# Patient Record
Sex: MALE | Race: WHITE | NOT HISPANIC OR LATINO | URBAN - METROPOLITAN AREA
[De-identification: names, ages, dates, MRNs, and addresses within clinical notes are randomized per-mention and may not be internally consistent; named-entity substitution may affect disease eponyms.]

---

## 2018-01-27 ENCOUNTER — EMERGENCY (EMERGENCY)
Facility: HOSPITAL | Age: 78
LOS: 0 days | Discharge: HOME | End: 2018-01-27

## 2018-01-27 DIAGNOSIS — N20.2 CALCULUS OF KIDNEY WITH CALCULUS OF URETER: ICD-10-CM

## 2018-01-27 DIAGNOSIS — M84.48XA PATHOLOGICAL FRACTURE, OTHER SITE, INITIAL ENCOUNTER FOR FRACTURE: ICD-10-CM

## 2018-01-27 DIAGNOSIS — F41.9 ANXIETY DISORDER, UNSPECIFIED: ICD-10-CM

## 2018-01-27 DIAGNOSIS — J44.1 CHRONIC OBSTRUCTIVE PULMONARY DISEASE WITH (ACUTE) EXACERBATION: ICD-10-CM

## 2018-01-27 DIAGNOSIS — N13.30 UNSPECIFIED HYDRONEPHROSIS: ICD-10-CM

## 2018-01-27 DIAGNOSIS — N39.0 URINARY TRACT INFECTION, SITE NOT SPECIFIED: ICD-10-CM

## 2018-01-27 DIAGNOSIS — Z87.442 PERSONAL HISTORY OF URINARY CALCULI: ICD-10-CM

## 2018-01-27 DIAGNOSIS — Z79.899 OTHER LONG TERM (CURRENT) DRUG THERAPY: ICD-10-CM

## 2018-01-27 DIAGNOSIS — R10.9 UNSPECIFIED ABDOMINAL PAIN: ICD-10-CM

## 2018-01-27 DIAGNOSIS — N40.0 BENIGN PROSTATIC HYPERPLASIA WITHOUT LOWER URINARY TRACT SYMPTOMS: ICD-10-CM

## 2019-01-01 ENCOUNTER — INPATIENT (INPATIENT)
Facility: HOSPITAL | Age: 79
LOS: 10 days | Discharge: HOME HEALTH | End: 2019-11-11
Payer: MEDICAID

## 2019-01-01 ENCOUNTER — INPATIENT (INPATIENT)
Facility: HOSPITAL | Age: 79
LOS: 2 days | Discharge: HOME | End: 2019-10-12
Payer: MEDICAID

## 2019-01-01 ENCOUNTER — INPATIENT (INPATIENT)
Facility: HOSPITAL | Age: 79
LOS: 12 days | End: 2019-11-28
Attending: HOSPITALIST | Admitting: HOSPITALIST
Payer: MEDICAID

## 2019-01-01 ENCOUNTER — INPATIENT (INPATIENT)
Facility: HOSPITAL | Age: 79
LOS: 13 days | Discharge: HOME HEALTH | End: 2019-08-15
Attending: PHYSICAL MEDICINE & REHABILITATION | Admitting: PHYSICAL MEDICINE & REHABILITATION
Payer: MEDICAID

## 2019-01-01 ENCOUNTER — INPATIENT (INPATIENT)
Facility: HOSPITAL | Age: 79
LOS: 3 days | Discharge: NURSING, REHAB OR LONG-TERM CARE | End: 2019-08-01
Attending: HOSPITALIST | Admitting: HOSPITALIST
Payer: MEDICAID

## 2019-01-01 VITALS
HEART RATE: 75 BPM | RESPIRATION RATE: 22 BRPM | DIASTOLIC BLOOD PRESSURE: 69 MMHG | SYSTOLIC BLOOD PRESSURE: 120 MMHG | OXYGEN SATURATION: 95 % | TEMPERATURE: 99 F

## 2019-01-01 VITALS — DIASTOLIC BLOOD PRESSURE: 62 MMHG | HEART RATE: 98 BPM | SYSTOLIC BLOOD PRESSURE: 112 MMHG

## 2019-01-01 VITALS
DIASTOLIC BLOOD PRESSURE: 55 MMHG | HEART RATE: 125 BPM | SYSTOLIC BLOOD PRESSURE: 99 MMHG | TEMPERATURE: 97 F | OXYGEN SATURATION: 91 % | RESPIRATION RATE: 18 BRPM

## 2019-01-01 VITALS
SYSTOLIC BLOOD PRESSURE: 140 MMHG | HEART RATE: 97 BPM | TEMPERATURE: 99 F | RESPIRATION RATE: 18 BRPM | DIASTOLIC BLOOD PRESSURE: 64 MMHG

## 2019-01-01 VITALS
HEART RATE: 54 BPM | TEMPERATURE: 98 F | DIASTOLIC BLOOD PRESSURE: 51 MMHG | RESPIRATION RATE: 17 BRPM | OXYGEN SATURATION: 95 % | SYSTOLIC BLOOD PRESSURE: 105 MMHG

## 2019-01-01 VITALS
RESPIRATION RATE: 24 BRPM | DIASTOLIC BLOOD PRESSURE: 58 MMHG | TEMPERATURE: 101 F | HEART RATE: 146 BPM | OXYGEN SATURATION: 90 % | SYSTOLIC BLOOD PRESSURE: 113 MMHG

## 2019-01-01 VITALS
WEIGHT: 173.94 LBS | RESPIRATION RATE: 18 BRPM | SYSTOLIC BLOOD PRESSURE: 125 MMHG | TEMPERATURE: 96 F | DIASTOLIC BLOOD PRESSURE: 74 MMHG | HEART RATE: 94 BPM

## 2019-01-01 VITALS — OXYGEN SATURATION: 93 % | RESPIRATION RATE: 21 BRPM

## 2019-01-01 DIAGNOSIS — J69.0 PNEUMONITIS DUE TO INHALATION OF FOOD AND VOMIT: ICD-10-CM

## 2019-01-01 DIAGNOSIS — Z87.891 PERSONAL HISTORY OF NICOTINE DEPENDENCE: ICD-10-CM

## 2019-01-01 DIAGNOSIS — J44.0 CHRONIC OBSTRUCTIVE PULMONARY DISEASE WITH (ACUTE) LOWER RESPIRATORY INFECTION: ICD-10-CM

## 2019-01-01 DIAGNOSIS — E87.6 HYPOKALEMIA: ICD-10-CM

## 2019-01-01 DIAGNOSIS — I95.9 HYPOTENSION, UNSPECIFIED: ICD-10-CM

## 2019-01-01 DIAGNOSIS — R62.7 ADULT FAILURE TO THRIVE: ICD-10-CM

## 2019-01-01 DIAGNOSIS — I10 ESSENTIAL (PRIMARY) HYPERTENSION: ICD-10-CM

## 2019-01-01 DIAGNOSIS — F17.200 NICOTINE DEPENDENCE, UNSPECIFIED, UNCOMPLICATED: ICD-10-CM

## 2019-01-01 DIAGNOSIS — G93.41 METABOLIC ENCEPHALOPATHY: ICD-10-CM

## 2019-01-01 DIAGNOSIS — E83.42 HYPOMAGNESEMIA: ICD-10-CM

## 2019-01-01 DIAGNOSIS — R63.6 UNDERWEIGHT: ICD-10-CM

## 2019-01-01 DIAGNOSIS — N39.0 URINARY TRACT INFECTION, SITE NOT SPECIFIED: ICD-10-CM

## 2019-01-01 DIAGNOSIS — R53.81 OTHER MALAISE: ICD-10-CM

## 2019-01-01 DIAGNOSIS — R47.1 DYSARTHRIA AND ANARTHRIA: ICD-10-CM

## 2019-01-01 DIAGNOSIS — J15.6 PNEUMONIA DUE TO OTHER GRAM-NEGATIVE BACTERIA: ICD-10-CM

## 2019-01-01 DIAGNOSIS — R63.8 OTHER SYMPTOMS AND SIGNS CONCERNING FOOD AND FLUID INTAKE: ICD-10-CM

## 2019-01-01 DIAGNOSIS — R09.89 OTHER SPECIFIED SYMPTOMS AND SIGNS INVOLVING THE CIRCULATORY AND RESPIRATORY SYSTEMS: ICD-10-CM

## 2019-01-01 DIAGNOSIS — J43.9 EMPHYSEMA, UNSPECIFIED: ICD-10-CM

## 2019-01-01 DIAGNOSIS — J20.8 ACUTE BRONCHITIS DUE TO OTHER SPECIFIED ORGANISMS: ICD-10-CM

## 2019-01-01 DIAGNOSIS — F03.90 UNSPECIFIED DEMENTIA WITHOUT BEHAVIORAL DISTURBANCE: ICD-10-CM

## 2019-01-01 DIAGNOSIS — R32 UNSPECIFIED URINARY INCONTINENCE: ICD-10-CM

## 2019-01-01 DIAGNOSIS — R33.8 OTHER RETENTION OF URINE: ICD-10-CM

## 2019-01-01 DIAGNOSIS — E61.2 MAGNESIUM DEFICIENCY: ICD-10-CM

## 2019-01-01 DIAGNOSIS — E43 UNSPECIFIED SEVERE PROTEIN-CALORIE MALNUTRITION: ICD-10-CM

## 2019-01-01 DIAGNOSIS — R13.10 DYSPHAGIA, UNSPECIFIED: ICD-10-CM

## 2019-01-01 DIAGNOSIS — R00.0 TACHYCARDIA, UNSPECIFIED: ICD-10-CM

## 2019-01-01 DIAGNOSIS — N40.0 BENIGN PROSTATIC HYPERPLASIA WITHOUT LOWER URINARY TRACT SYMPTOMS: ICD-10-CM

## 2019-01-01 DIAGNOSIS — J44.9 CHRONIC OBSTRUCTIVE PULMONARY DISEASE, UNSPECIFIED: ICD-10-CM

## 2019-01-01 DIAGNOSIS — R05 COUGH: ICD-10-CM

## 2019-01-01 DIAGNOSIS — R26.9 UNSPECIFIED ABNORMALITIES OF GAIT AND MOBILITY: ICD-10-CM

## 2019-01-01 DIAGNOSIS — Z93.1 GASTROSTOMY STATUS: Chronic | ICD-10-CM

## 2019-01-01 DIAGNOSIS — E78.5 HYPERLIPIDEMIA, UNSPECIFIED: ICD-10-CM

## 2019-01-01 DIAGNOSIS — A41.9 SEPSIS, UNSPECIFIED ORGANISM: ICD-10-CM

## 2019-01-01 DIAGNOSIS — R50.9 FEVER, UNSPECIFIED: ICD-10-CM

## 2019-01-01 DIAGNOSIS — R26.2 DIFFICULTY IN WALKING, NOT ELSEWHERE CLASSIFIED: ICD-10-CM

## 2019-01-01 DIAGNOSIS — N40.1 BENIGN PROSTATIC HYPERPLASIA WITH LOWER URINARY TRACT SYMPTOMS: ICD-10-CM

## 2019-01-01 DIAGNOSIS — J84.9 INTERSTITIAL PULMONARY DISEASE, UNSPECIFIED: ICD-10-CM

## 2019-01-01 DIAGNOSIS — M19.90 UNSPECIFIED OSTEOARTHRITIS, UNSPECIFIED SITE: ICD-10-CM

## 2019-01-01 DIAGNOSIS — E53.8 DEFICIENCY OF OTHER SPECIFIED B GROUP VITAMINS: ICD-10-CM

## 2019-01-01 DIAGNOSIS — E87.0 HYPEROSMOLALITY AND HYPERNATREMIA: ICD-10-CM

## 2019-01-01 LAB
ALBUMIN SERPL ELPH-MCNC: 2.2 G/DL — LOW (ref 3.5–5.2)
ALBUMIN SERPL ELPH-MCNC: 2.3 G/DL — LOW (ref 3.5–5.2)
ALBUMIN SERPL ELPH-MCNC: 2.4 G/DL — LOW (ref 3.5–5.2)
ALBUMIN SERPL ELPH-MCNC: 2.4 G/DL — LOW (ref 3.5–5.2)
ALBUMIN SERPL ELPH-MCNC: 2.6 G/DL — LOW (ref 3.5–5.2)
ALBUMIN SERPL ELPH-MCNC: 2.7 G/DL — LOW (ref 3.5–5.2)
ALBUMIN SERPL ELPH-MCNC: 2.9 G/DL — LOW (ref 3.5–5.2)
ALBUMIN SERPL ELPH-MCNC: 3 G/DL — LOW (ref 3.5–5.2)
ALBUMIN SERPL ELPH-MCNC: 3.1 G/DL — LOW (ref 3.5–5.2)
ALBUMIN SERPL ELPH-MCNC: 3.2 G/DL — LOW (ref 3.5–5.2)
ALBUMIN SERPL ELPH-MCNC: 3.6 G/DL — SIGNIFICANT CHANGE UP (ref 3.5–5.2)
ALBUMIN SERPL ELPH-MCNC: 3.7 G/DL — SIGNIFICANT CHANGE UP (ref 3.5–5.2)
ALP SERPL-CCNC: 47 U/L — SIGNIFICANT CHANGE UP (ref 30–115)
ALP SERPL-CCNC: 56 U/L — SIGNIFICANT CHANGE UP (ref 30–115)
ALP SERPL-CCNC: 58 U/L — SIGNIFICANT CHANGE UP (ref 30–115)
ALP SERPL-CCNC: 59 U/L — SIGNIFICANT CHANGE UP (ref 30–115)
ALP SERPL-CCNC: 60 U/L — SIGNIFICANT CHANGE UP (ref 30–115)
ALP SERPL-CCNC: 60 U/L — SIGNIFICANT CHANGE UP (ref 30–115)
ALP SERPL-CCNC: 64 U/L — SIGNIFICANT CHANGE UP (ref 30–115)
ALP SERPL-CCNC: 65 U/L — SIGNIFICANT CHANGE UP (ref 30–115)
ALP SERPL-CCNC: 65 U/L — SIGNIFICANT CHANGE UP (ref 30–115)
ALP SERPL-CCNC: 68 U/L — SIGNIFICANT CHANGE UP (ref 30–115)
ALP SERPL-CCNC: 68 U/L — SIGNIFICANT CHANGE UP (ref 30–115)
ALP SERPL-CCNC: 69 U/L — SIGNIFICANT CHANGE UP (ref 30–115)
ALP SERPL-CCNC: 74 U/L — SIGNIFICANT CHANGE UP (ref 30–115)
ALP SERPL-CCNC: 85 U/L — SIGNIFICANT CHANGE UP (ref 30–115)
ALP SERPL-CCNC: 89 U/L — SIGNIFICANT CHANGE UP (ref 30–115)
ALT FLD-CCNC: 15 U/L — SIGNIFICANT CHANGE UP (ref 0–41)
ALT FLD-CCNC: 22 U/L — SIGNIFICANT CHANGE UP (ref 0–41)
ALT FLD-CCNC: 27 U/L — SIGNIFICANT CHANGE UP (ref 0–41)
ALT FLD-CCNC: 29 U/L — SIGNIFICANT CHANGE UP (ref 0–41)
ALT FLD-CCNC: 32 U/L — SIGNIFICANT CHANGE UP (ref 0–41)
ALT FLD-CCNC: 34 U/L — SIGNIFICANT CHANGE UP (ref 0–41)
ALT FLD-CCNC: 35 U/L — SIGNIFICANT CHANGE UP (ref 0–41)
ALT FLD-CCNC: 37 U/L — SIGNIFICANT CHANGE UP (ref 0–41)
ALT FLD-CCNC: 38 U/L — SIGNIFICANT CHANGE UP (ref 0–41)
ALT FLD-CCNC: 39 U/L — SIGNIFICANT CHANGE UP (ref 0–41)
ALT FLD-CCNC: 40 U/L — SIGNIFICANT CHANGE UP (ref 0–41)
ALT FLD-CCNC: 41 U/L — SIGNIFICANT CHANGE UP (ref 0–41)
ALT FLD-CCNC: 6 U/L — SIGNIFICANT CHANGE UP (ref 0–41)
ALT FLD-CCNC: 8 U/L — SIGNIFICANT CHANGE UP (ref 0–41)
ALT FLD-CCNC: 8 U/L — SIGNIFICANT CHANGE UP (ref 0–41)
ANION GAP SERPL CALC-SCNC: 10 MMOL/L — SIGNIFICANT CHANGE UP (ref 7–14)
ANION GAP SERPL CALC-SCNC: 11 MMOL/L — SIGNIFICANT CHANGE UP (ref 7–14)
ANION GAP SERPL CALC-SCNC: 12 MMOL/L — SIGNIFICANT CHANGE UP (ref 7–14)
ANION GAP SERPL CALC-SCNC: 13 MMOL/L — SIGNIFICANT CHANGE UP (ref 7–14)
ANION GAP SERPL CALC-SCNC: 14 MMOL/L — SIGNIFICANT CHANGE UP (ref 7–14)
ANION GAP SERPL CALC-SCNC: 16 MMOL/L — HIGH (ref 7–14)
ANION GAP SERPL CALC-SCNC: 18 MMOL/L — HIGH (ref 7–14)
ANION GAP SERPL CALC-SCNC: 19 MMOL/L — HIGH (ref 7–14)
ANION GAP SERPL CALC-SCNC: 19 MMOL/L — HIGH (ref 7–14)
ANION GAP SERPL CALC-SCNC: 8 MMOL/L — SIGNIFICANT CHANGE UP (ref 7–14)
ANION GAP SERPL CALC-SCNC: 9 MMOL/L — SIGNIFICANT CHANGE UP (ref 7–14)
ANISOCYTOSIS BLD QL: SLIGHT — SIGNIFICANT CHANGE UP
APPEARANCE UR: CLEAR — SIGNIFICANT CHANGE UP
APTT BLD: 25.5 SEC — LOW (ref 27–39.2)
APTT BLD: 30 SEC — SIGNIFICANT CHANGE UP (ref 27–39.2)
APTT BLD: 31.8 SEC — SIGNIFICANT CHANGE UP (ref 27–39.2)
APTT BLD: 33.6 SEC — SIGNIFICANT CHANGE UP (ref 27–39.2)
AST SERPL-CCNC: 19 U/L — SIGNIFICANT CHANGE UP (ref 0–41)
AST SERPL-CCNC: 21 U/L — SIGNIFICANT CHANGE UP (ref 0–41)
AST SERPL-CCNC: 29 U/L — SIGNIFICANT CHANGE UP (ref 0–41)
AST SERPL-CCNC: 31 U/L — SIGNIFICANT CHANGE UP (ref 0–41)
AST SERPL-CCNC: 37 U/L — SIGNIFICANT CHANGE UP (ref 0–41)
AST SERPL-CCNC: 37 U/L — SIGNIFICANT CHANGE UP (ref 0–41)
AST SERPL-CCNC: 41 U/L — SIGNIFICANT CHANGE UP (ref 0–41)
AST SERPL-CCNC: 46 U/L — HIGH (ref 0–41)
AST SERPL-CCNC: 47 U/L — HIGH (ref 0–41)
AST SERPL-CCNC: 55 U/L — HIGH (ref 0–41)
AST SERPL-CCNC: 56 U/L — HIGH (ref 0–41)
AST SERPL-CCNC: 57 U/L — HIGH (ref 0–41)
AST SERPL-CCNC: 65 U/L — HIGH (ref 0–41)
AST SERPL-CCNC: 70 U/L — HIGH (ref 0–41)
AST SERPL-CCNC: 71 U/L — HIGH (ref 0–41)
BACTERIA # UR AUTO: NEGATIVE — SIGNIFICANT CHANGE UP
BASE EXCESS BLDA CALC-SCNC: 3.8 MMOL/L — HIGH (ref -2–2)
BASE EXCESS BLDA CALC-SCNC: 4.9 MMOL/L — HIGH (ref -2–2)
BASE EXCESS BLDV CALC-SCNC: 2.8 MMOL/L — HIGH (ref -2–2)
BASE EXCESS BLDV CALC-SCNC: 3.7 MMOL/L — HIGH (ref -2–2)
BASE EXCESS BLDV CALC-SCNC: 4.8 MMOL/L — HIGH (ref -2–2)
BASE EXCESS BLDV CALC-SCNC: 5 MMOL/L — HIGH (ref -2–2)
BASE EXCESS BLDV CALC-SCNC: 5 MMOL/L — HIGH (ref -2–2)
BASOPHILS # BLD AUTO: 0 K/UL — SIGNIFICANT CHANGE UP (ref 0–0.2)
BASOPHILS # BLD AUTO: 0 K/UL — SIGNIFICANT CHANGE UP (ref 0–0.2)
BASOPHILS # BLD AUTO: 0.01 K/UL — SIGNIFICANT CHANGE UP (ref 0–0.2)
BASOPHILS # BLD AUTO: 0.02 K/UL — SIGNIFICANT CHANGE UP (ref 0–0.2)
BASOPHILS # BLD AUTO: 0.03 K/UL — SIGNIFICANT CHANGE UP (ref 0–0.2)
BASOPHILS # BLD AUTO: 0.04 K/UL — SIGNIFICANT CHANGE UP (ref 0–0.2)
BASOPHILS # BLD AUTO: 0.04 K/UL — SIGNIFICANT CHANGE UP (ref 0–0.2)
BASOPHILS # BLD AUTO: 0.06 K/UL — SIGNIFICANT CHANGE UP (ref 0–0.2)
BASOPHILS NFR BLD AUTO: 0 % — SIGNIFICANT CHANGE UP (ref 0–1)
BASOPHILS NFR BLD AUTO: 0 % — SIGNIFICANT CHANGE UP (ref 0–1)
BASOPHILS NFR BLD AUTO: 0.1 % — SIGNIFICANT CHANGE UP (ref 0–1)
BASOPHILS NFR BLD AUTO: 0.2 % — SIGNIFICANT CHANGE UP (ref 0–1)
BASOPHILS NFR BLD AUTO: 0.3 % — SIGNIFICANT CHANGE UP (ref 0–1)
BASOPHILS NFR BLD AUTO: 0.4 % — SIGNIFICANT CHANGE UP (ref 0–1)
BILIRUB SERPL-MCNC: 0.3 MG/DL — SIGNIFICANT CHANGE UP (ref 0.2–1.2)
BILIRUB SERPL-MCNC: 0.3 MG/DL — SIGNIFICANT CHANGE UP (ref 0.2–1.2)
BILIRUB SERPL-MCNC: 0.4 MG/DL — SIGNIFICANT CHANGE UP (ref 0.2–1.2)
BILIRUB SERPL-MCNC: 0.5 MG/DL — SIGNIFICANT CHANGE UP (ref 0.2–1.2)
BILIRUB SERPL-MCNC: 0.6 MG/DL — SIGNIFICANT CHANGE UP (ref 0.2–1.2)
BILIRUB SERPL-MCNC: 0.7 MG/DL — SIGNIFICANT CHANGE UP (ref 0.2–1.2)
BILIRUB SERPL-MCNC: 0.7 MG/DL — SIGNIFICANT CHANGE UP (ref 0.2–1.2)
BILIRUB SERPL-MCNC: 0.8 MG/DL — SIGNIFICANT CHANGE UP (ref 0.2–1.2)
BILIRUB SERPL-MCNC: 0.8 MG/DL — SIGNIFICANT CHANGE UP (ref 0.2–1.2)
BILIRUB UR-MCNC: NEGATIVE — SIGNIFICANT CHANGE UP
BLD GP AB SCN SERPL QL: SIGNIFICANT CHANGE UP
BUN SERPL-MCNC: 10 MG/DL — SIGNIFICANT CHANGE UP (ref 10–20)
BUN SERPL-MCNC: 11 MG/DL — SIGNIFICANT CHANGE UP (ref 10–20)
BUN SERPL-MCNC: 12 MG/DL — SIGNIFICANT CHANGE UP (ref 10–20)
BUN SERPL-MCNC: 15 MG/DL — SIGNIFICANT CHANGE UP (ref 10–20)
BUN SERPL-MCNC: 16 MG/DL — SIGNIFICANT CHANGE UP (ref 10–20)
BUN SERPL-MCNC: 16 MG/DL — SIGNIFICANT CHANGE UP (ref 10–20)
BUN SERPL-MCNC: 17 MG/DL — SIGNIFICANT CHANGE UP (ref 10–20)
BUN SERPL-MCNC: 17 MG/DL — SIGNIFICANT CHANGE UP (ref 10–20)
BUN SERPL-MCNC: 18 MG/DL — SIGNIFICANT CHANGE UP (ref 10–20)
BUN SERPL-MCNC: 18 MG/DL — SIGNIFICANT CHANGE UP (ref 10–20)
BUN SERPL-MCNC: 19 MG/DL — SIGNIFICANT CHANGE UP (ref 10–20)
BUN SERPL-MCNC: 20 MG/DL — SIGNIFICANT CHANGE UP (ref 10–20)
BUN SERPL-MCNC: 21 MG/DL — HIGH (ref 10–20)
BUN SERPL-MCNC: 22 MG/DL — HIGH (ref 10–20)
BUN SERPL-MCNC: 22 MG/DL — HIGH (ref 10–20)
BUN SERPL-MCNC: 23 MG/DL — HIGH (ref 10–20)
BUN SERPL-MCNC: 24 MG/DL — HIGH (ref 10–20)
BUN SERPL-MCNC: 25 MG/DL — HIGH (ref 10–20)
BUN SERPL-MCNC: 25 MG/DL — HIGH (ref 10–20)
BUN SERPL-MCNC: 30 MG/DL — HIGH (ref 10–20)
BUN SERPL-MCNC: 31 MG/DL — HIGH (ref 10–20)
BUN SERPL-MCNC: 31 MG/DL — HIGH (ref 10–20)
BUN SERPL-MCNC: 32 MG/DL — HIGH (ref 10–20)
BUN SERPL-MCNC: 33 MG/DL — HIGH (ref 10–20)
BUN SERPL-MCNC: 35 MG/DL — HIGH (ref 10–20)
BUN SERPL-MCNC: 44 MG/DL — HIGH (ref 10–20)
BUN SERPL-MCNC: 6 MG/DL — LOW (ref 10–20)
BUN SERPL-MCNC: 9 MG/DL — LOW (ref 10–20)
CA-I SERPL-SCNC: 1.19 MMOL/L — SIGNIFICANT CHANGE UP (ref 1.12–1.3)
CA-I SERPL-SCNC: 1.19 MMOL/L — SIGNIFICANT CHANGE UP (ref 1.12–1.3)
CA-I SERPL-SCNC: 1.2 MMOL/L — SIGNIFICANT CHANGE UP (ref 1.12–1.3)
CA-I SERPL-SCNC: 1.2 MMOL/L — SIGNIFICANT CHANGE UP (ref 1.12–1.3)
CA-I SERPL-SCNC: 1.21 MMOL/L — SIGNIFICANT CHANGE UP (ref 1.12–1.3)
CALCIUM SERPL-MCNC: 7.7 MG/DL — LOW (ref 8.5–10.1)
CALCIUM SERPL-MCNC: 7.7 MG/DL — LOW (ref 8.5–10.1)
CALCIUM SERPL-MCNC: 7.9 MG/DL — LOW (ref 8.5–10.1)
CALCIUM SERPL-MCNC: 7.9 MG/DL — LOW (ref 8.5–10.1)
CALCIUM SERPL-MCNC: 8 MG/DL — LOW (ref 8.5–10.1)
CALCIUM SERPL-MCNC: 8.1 MG/DL — LOW (ref 8.5–10.1)
CALCIUM SERPL-MCNC: 8.1 MG/DL — LOW (ref 8.5–10.1)
CALCIUM SERPL-MCNC: 8.2 MG/DL — LOW (ref 8.5–10.1)
CALCIUM SERPL-MCNC: 8.3 MG/DL — LOW (ref 8.5–10.1)
CALCIUM SERPL-MCNC: 8.4 MG/DL — LOW (ref 8.5–10.1)
CALCIUM SERPL-MCNC: 8.5 MG/DL — SIGNIFICANT CHANGE UP (ref 8.5–10.1)
CALCIUM SERPL-MCNC: 8.5 MG/DL — SIGNIFICANT CHANGE UP (ref 8.5–10.1)
CALCIUM SERPL-MCNC: 8.6 MG/DL — SIGNIFICANT CHANGE UP (ref 8.5–10.1)
CALCIUM SERPL-MCNC: 8.7 MG/DL — SIGNIFICANT CHANGE UP (ref 8.5–10.1)
CALCIUM SERPL-MCNC: 8.7 MG/DL — SIGNIFICANT CHANGE UP (ref 8.5–10.1)
CALCIUM SERPL-MCNC: 8.8 MG/DL — SIGNIFICANT CHANGE UP (ref 8.5–10.1)
CALCIUM SERPL-MCNC: 9 MG/DL — SIGNIFICANT CHANGE UP (ref 8.5–10.1)
CALCIUM SERPL-MCNC: 9.1 MG/DL — SIGNIFICANT CHANGE UP (ref 8.5–10.1)
CALCIUM SERPL-MCNC: 9.3 MG/DL — SIGNIFICANT CHANGE UP (ref 8.5–10.1)
CALCIUM SERPL-MCNC: 9.4 MG/DL — SIGNIFICANT CHANGE UP (ref 8.5–10.1)
CALCIUM SERPL-MCNC: 9.7 MG/DL — SIGNIFICANT CHANGE UP (ref 8.5–10.1)
CHLORIDE SERPL-SCNC: 100 MMOL/L — SIGNIFICANT CHANGE UP (ref 98–110)
CHLORIDE SERPL-SCNC: 101 MMOL/L — SIGNIFICANT CHANGE UP (ref 98–110)
CHLORIDE SERPL-SCNC: 102 MMOL/L — SIGNIFICANT CHANGE UP (ref 98–110)
CHLORIDE SERPL-SCNC: 104 MMOL/L — SIGNIFICANT CHANGE UP (ref 98–110)
CHLORIDE SERPL-SCNC: 105 MMOL/L — SIGNIFICANT CHANGE UP (ref 98–110)
CHLORIDE SERPL-SCNC: 106 MMOL/L — SIGNIFICANT CHANGE UP (ref 98–110)
CHLORIDE SERPL-SCNC: 107 MMOL/L — SIGNIFICANT CHANGE UP (ref 98–110)
CHLORIDE SERPL-SCNC: 108 MMOL/L — SIGNIFICANT CHANGE UP (ref 98–110)
CHLORIDE SERPL-SCNC: 109 MMOL/L — SIGNIFICANT CHANGE UP (ref 98–110)
CHLORIDE SERPL-SCNC: 97 MMOL/L — LOW (ref 98–110)
CHLORIDE SERPL-SCNC: 97 MMOL/L — LOW (ref 98–110)
CHLORIDE SERPL-SCNC: 98 MMOL/L — SIGNIFICANT CHANGE UP (ref 98–110)
CHLORIDE SERPL-SCNC: 98 MMOL/L — SIGNIFICANT CHANGE UP (ref 98–110)
CHLORIDE SERPL-SCNC: 99 MMOL/L — SIGNIFICANT CHANGE UP (ref 98–110)
CHOLEST SERPL-MCNC: 82 MG/DL — LOW (ref 100–200)
CK SERPL-CCNC: 47 U/L — SIGNIFICANT CHANGE UP (ref 0–225)
CO2 SERPL-SCNC: 21 MMOL/L — SIGNIFICANT CHANGE UP (ref 17–32)
CO2 SERPL-SCNC: 22 MMOL/L — SIGNIFICANT CHANGE UP (ref 17–32)
CO2 SERPL-SCNC: 23 MMOL/L — SIGNIFICANT CHANGE UP (ref 17–32)
CO2 SERPL-SCNC: 23 MMOL/L — SIGNIFICANT CHANGE UP (ref 17–32)
CO2 SERPL-SCNC: 24 MMOL/L — SIGNIFICANT CHANGE UP (ref 17–32)
CO2 SERPL-SCNC: 25 MMOL/L — SIGNIFICANT CHANGE UP (ref 17–32)
CO2 SERPL-SCNC: 26 MMOL/L — SIGNIFICANT CHANGE UP (ref 17–32)
CO2 SERPL-SCNC: 27 MMOL/L — SIGNIFICANT CHANGE UP (ref 17–32)
CO2 SERPL-SCNC: 28 MMOL/L — SIGNIFICANT CHANGE UP (ref 17–32)
CO2 SERPL-SCNC: 28 MMOL/L — SIGNIFICANT CHANGE UP (ref 17–32)
CO2 SERPL-SCNC: 29 MMOL/L — SIGNIFICANT CHANGE UP (ref 17–32)
COLOR SPEC: YELLOW — SIGNIFICANT CHANGE UP
CREAT SERPL-MCNC: 0.5 MG/DL — LOW (ref 0.7–1.5)
CREAT SERPL-MCNC: 0.6 MG/DL — LOW (ref 0.7–1.5)
CREAT SERPL-MCNC: 0.7 MG/DL — SIGNIFICANT CHANGE UP (ref 0.7–1.5)
CREAT SERPL-MCNC: 0.8 MG/DL — SIGNIFICANT CHANGE UP (ref 0.7–1.5)
CREAT SERPL-MCNC: 0.9 MG/DL — SIGNIFICANT CHANGE UP (ref 0.7–1.5)
CREAT SERPL-MCNC: 1 MG/DL — SIGNIFICANT CHANGE UP (ref 0.7–1.5)
CREAT SERPL-MCNC: 1.1 MG/DL — SIGNIFICANT CHANGE UP (ref 0.7–1.5)
CREAT SERPL-MCNC: <0.5 MG/DL — LOW (ref 0.7–1.5)
CULTURE RESULTS: NO GROWTH — SIGNIFICANT CHANGE UP
CULTURE RESULTS: NO GROWTH — SIGNIFICANT CHANGE UP
CULTURE RESULTS: SIGNIFICANT CHANGE UP
DIFF PNL FLD: ABNORMAL
EOSINOPHIL # BLD AUTO: 0 K/UL — SIGNIFICANT CHANGE UP (ref 0–0.7)
EOSINOPHIL # BLD AUTO: 0.01 K/UL — SIGNIFICANT CHANGE UP (ref 0–0.7)
EOSINOPHIL # BLD AUTO: 0.01 K/UL — SIGNIFICANT CHANGE UP (ref 0–0.7)
EOSINOPHIL # BLD AUTO: 0.02 K/UL — SIGNIFICANT CHANGE UP (ref 0–0.7)
EOSINOPHIL # BLD AUTO: 0.04 K/UL — SIGNIFICANT CHANGE UP (ref 0–0.7)
EOSINOPHIL # BLD AUTO: 0.04 K/UL — SIGNIFICANT CHANGE UP (ref 0–0.7)
EOSINOPHIL # BLD AUTO: 0.05 K/UL — SIGNIFICANT CHANGE UP (ref 0–0.7)
EOSINOPHIL # BLD AUTO: 0.06 K/UL — SIGNIFICANT CHANGE UP (ref 0–0.7)
EOSINOPHIL # BLD AUTO: 0.07 K/UL — SIGNIFICANT CHANGE UP (ref 0–0.7)
EOSINOPHIL # BLD AUTO: 0.07 K/UL — SIGNIFICANT CHANGE UP (ref 0–0.7)
EOSINOPHIL # BLD AUTO: 0.08 K/UL — SIGNIFICANT CHANGE UP (ref 0–0.7)
EOSINOPHIL # BLD AUTO: 0.11 K/UL — SIGNIFICANT CHANGE UP (ref 0–0.7)
EOSINOPHIL # BLD AUTO: 0.11 K/UL — SIGNIFICANT CHANGE UP (ref 0–0.7)
EOSINOPHIL # BLD AUTO: 0.12 K/UL — SIGNIFICANT CHANGE UP (ref 0–0.7)
EOSINOPHIL # BLD AUTO: 0.13 K/UL — SIGNIFICANT CHANGE UP (ref 0–0.7)
EOSINOPHIL # BLD AUTO: 0.19 K/UL — SIGNIFICANT CHANGE UP (ref 0–0.7)
EOSINOPHIL NFR BLD AUTO: 0 % — SIGNIFICANT CHANGE UP (ref 0–8)
EOSINOPHIL NFR BLD AUTO: 0.1 % — SIGNIFICANT CHANGE UP (ref 0–8)
EOSINOPHIL NFR BLD AUTO: 0.1 % — SIGNIFICANT CHANGE UP (ref 0–8)
EOSINOPHIL NFR BLD AUTO: 0.2 % — SIGNIFICANT CHANGE UP (ref 0–8)
EOSINOPHIL NFR BLD AUTO: 0.3 % — SIGNIFICANT CHANGE UP (ref 0–8)
EOSINOPHIL NFR BLD AUTO: 0.3 % — SIGNIFICANT CHANGE UP (ref 0–8)
EOSINOPHIL NFR BLD AUTO: 0.4 % — SIGNIFICANT CHANGE UP (ref 0–8)
EOSINOPHIL NFR BLD AUTO: 0.5 % — SIGNIFICANT CHANGE UP (ref 0–8)
EOSINOPHIL NFR BLD AUTO: 0.6 % — SIGNIFICANT CHANGE UP (ref 0–8)
EOSINOPHIL NFR BLD AUTO: 0.6 % — SIGNIFICANT CHANGE UP (ref 0–8)
EOSINOPHIL NFR BLD AUTO: 0.9 % — SIGNIFICANT CHANGE UP (ref 0–8)
EOSINOPHIL NFR BLD AUTO: 1.2 % — SIGNIFICANT CHANGE UP (ref 0–8)
EOSINOPHIL NFR BLD AUTO: 1.2 % — SIGNIFICANT CHANGE UP (ref 0–8)
EOSINOPHIL NFR BLD AUTO: 1.4 % — SIGNIFICANT CHANGE UP (ref 0–8)
EOSINOPHIL NFR BLD AUTO: 1.7 % — SIGNIFICANT CHANGE UP (ref 0–8)
EOSINOPHIL NFR BLD AUTO: 1.7 % — SIGNIFICANT CHANGE UP (ref 0–8)
EPI CELLS # UR: 2 /HPF — SIGNIFICANT CHANGE UP (ref 0–5)
EPI CELLS # UR: 2 /HPF — SIGNIFICANT CHANGE UP (ref 0–5)
EPI CELLS # UR: 6 /HPF — HIGH (ref 0–5)
EPI CELLS # UR: 9 /HPF — HIGH (ref 0–5)
ESTIMATED AVERAGE GLUCOSE: 114 MG/DL — SIGNIFICANT CHANGE UP (ref 68–114)
FERRITIN SERPL-MCNC: 2028 NG/ML — HIGH (ref 30–400)
FLU A RESULT: NEGATIVE — SIGNIFICANT CHANGE UP
FLU A RESULT: NEGATIVE — SIGNIFICANT CHANGE UP
FLUAV AG NPH QL: NEGATIVE — SIGNIFICANT CHANGE UP
FLUBV AG NPH QL: NEGATIVE — SIGNIFICANT CHANGE UP
FOLATE SERPL-MCNC: 4.2 NG/ML — LOW
FOLATE SERPL-MCNC: 7.4 NG/ML — SIGNIFICANT CHANGE UP
GAS PNL BLDA: SIGNIFICANT CHANGE UP
GAS PNL BLDV: 138 MMOL/L — SIGNIFICANT CHANGE UP (ref 136–145)
GAS PNL BLDV: 140 MMOL/L — SIGNIFICANT CHANGE UP (ref 136–145)
GAS PNL BLDV: 141 MMOL/L — SIGNIFICANT CHANGE UP (ref 136–145)
GAS PNL BLDV: 143 MMOL/L — SIGNIFICANT CHANGE UP (ref 136–145)
GAS PNL BLDV: 150 MMOL/L — HIGH (ref 136–145)
GAS PNL BLDV: SIGNIFICANT CHANGE UP
GIANT PLATELETS BLD QL SMEAR: PRESENT — SIGNIFICANT CHANGE UP
GLUCOSE BLDC GLUCOMTR-MCNC: 104 MG/DL — HIGH (ref 70–99)
GLUCOSE BLDC GLUCOMTR-MCNC: 106 MG/DL — HIGH (ref 70–99)
GLUCOSE BLDC GLUCOMTR-MCNC: 109 MG/DL — HIGH (ref 70–99)
GLUCOSE BLDC GLUCOMTR-MCNC: 111 MG/DL — HIGH (ref 70–99)
GLUCOSE BLDC GLUCOMTR-MCNC: 116 MG/DL — HIGH (ref 70–99)
GLUCOSE BLDC GLUCOMTR-MCNC: 118 MG/DL — HIGH (ref 70–99)
GLUCOSE BLDC GLUCOMTR-MCNC: 118 MG/DL — HIGH (ref 70–99)
GLUCOSE BLDC GLUCOMTR-MCNC: 121 MG/DL — HIGH (ref 70–99)
GLUCOSE BLDC GLUCOMTR-MCNC: 122 MG/DL — HIGH (ref 70–99)
GLUCOSE BLDC GLUCOMTR-MCNC: 130 MG/DL — HIGH (ref 70–99)
GLUCOSE BLDC GLUCOMTR-MCNC: 133 MG/DL — HIGH (ref 70–99)
GLUCOSE BLDC GLUCOMTR-MCNC: 137 MG/DL — HIGH (ref 70–99)
GLUCOSE BLDC GLUCOMTR-MCNC: 139 MG/DL — HIGH (ref 70–99)
GLUCOSE BLDC GLUCOMTR-MCNC: 145 MG/DL — HIGH (ref 70–99)
GLUCOSE BLDC GLUCOMTR-MCNC: 153 MG/DL — HIGH (ref 70–99)
GLUCOSE BLDC GLUCOMTR-MCNC: 156 MG/DL — HIGH (ref 70–99)
GLUCOSE BLDC GLUCOMTR-MCNC: 165 MG/DL — HIGH (ref 70–99)
GLUCOSE BLDC GLUCOMTR-MCNC: 169 MG/DL — HIGH (ref 70–99)
GLUCOSE BLDC GLUCOMTR-MCNC: 173 MG/DL — HIGH (ref 70–99)
GLUCOSE BLDC GLUCOMTR-MCNC: 175 MG/DL — HIGH (ref 70–99)
GLUCOSE BLDC GLUCOMTR-MCNC: 178 MG/DL — HIGH (ref 70–99)
GLUCOSE BLDC GLUCOMTR-MCNC: 189 MG/DL — HIGH (ref 70–99)
GLUCOSE BLDC GLUCOMTR-MCNC: 210 MG/DL — HIGH (ref 70–99)
GLUCOSE BLDC GLUCOMTR-MCNC: 221 MG/DL — HIGH (ref 70–99)
GLUCOSE BLDC GLUCOMTR-MCNC: 228 MG/DL — HIGH (ref 70–99)
GLUCOSE BLDC GLUCOMTR-MCNC: 86 MG/DL — SIGNIFICANT CHANGE UP (ref 70–99)
GLUCOSE BLDC GLUCOMTR-MCNC: 88 MG/DL — SIGNIFICANT CHANGE UP (ref 70–99)
GLUCOSE BLDC GLUCOMTR-MCNC: 92 MG/DL — SIGNIFICANT CHANGE UP (ref 70–99)
GLUCOSE BLDC GLUCOMTR-MCNC: 94 MG/DL — SIGNIFICANT CHANGE UP (ref 70–99)
GLUCOSE SERPL-MCNC: 100 MG/DL — HIGH (ref 70–99)
GLUCOSE SERPL-MCNC: 101 MG/DL — HIGH (ref 70–99)
GLUCOSE SERPL-MCNC: 107 MG/DL — HIGH (ref 70–99)
GLUCOSE SERPL-MCNC: 116 MG/DL — HIGH (ref 70–99)
GLUCOSE SERPL-MCNC: 117 MG/DL — HIGH (ref 70–99)
GLUCOSE SERPL-MCNC: 117 MG/DL — HIGH (ref 70–99)
GLUCOSE SERPL-MCNC: 121 MG/DL — HIGH (ref 70–99)
GLUCOSE SERPL-MCNC: 121 MG/DL — HIGH (ref 70–99)
GLUCOSE SERPL-MCNC: 123 MG/DL — HIGH (ref 70–99)
GLUCOSE SERPL-MCNC: 125 MG/DL — HIGH (ref 70–99)
GLUCOSE SERPL-MCNC: 125 MG/DL — HIGH (ref 70–99)
GLUCOSE SERPL-MCNC: 129 MG/DL — HIGH (ref 70–99)
GLUCOSE SERPL-MCNC: 132 MG/DL — HIGH (ref 70–99)
GLUCOSE SERPL-MCNC: 136 MG/DL — HIGH (ref 70–99)
GLUCOSE SERPL-MCNC: 141 MG/DL — HIGH (ref 70–99)
GLUCOSE SERPL-MCNC: 151 MG/DL — HIGH (ref 70–99)
GLUCOSE SERPL-MCNC: 160 MG/DL — HIGH (ref 70–99)
GLUCOSE SERPL-MCNC: 168 MG/DL — HIGH (ref 70–99)
GLUCOSE SERPL-MCNC: 207 MG/DL — HIGH (ref 70–99)
GLUCOSE SERPL-MCNC: 296 MG/DL — HIGH (ref 70–99)
GLUCOSE SERPL-MCNC: 82 MG/DL — SIGNIFICANT CHANGE UP (ref 70–99)
GLUCOSE SERPL-MCNC: 84 MG/DL — SIGNIFICANT CHANGE UP (ref 70–99)
GLUCOSE SERPL-MCNC: 88 MG/DL — SIGNIFICANT CHANGE UP (ref 70–99)
GLUCOSE SERPL-MCNC: 88 MG/DL — SIGNIFICANT CHANGE UP (ref 70–99)
GLUCOSE SERPL-MCNC: 89 MG/DL — SIGNIFICANT CHANGE UP (ref 70–99)
GLUCOSE SERPL-MCNC: 91 MG/DL — SIGNIFICANT CHANGE UP (ref 70–99)
GLUCOSE SERPL-MCNC: 91 MG/DL — SIGNIFICANT CHANGE UP (ref 70–99)
GLUCOSE SERPL-MCNC: 93 MG/DL — SIGNIFICANT CHANGE UP (ref 70–99)
GLUCOSE SERPL-MCNC: 95 MG/DL — SIGNIFICANT CHANGE UP (ref 70–99)
GLUCOSE SERPL-MCNC: 96 MG/DL — SIGNIFICANT CHANGE UP (ref 70–99)
GLUCOSE SERPL-MCNC: 96 MG/DL — SIGNIFICANT CHANGE UP (ref 70–99)
GLUCOSE SERPL-MCNC: 97 MG/DL — SIGNIFICANT CHANGE UP (ref 70–99)
GLUCOSE SERPL-MCNC: 98 MG/DL — SIGNIFICANT CHANGE UP (ref 70–99)
GLUCOSE UR QL: NEGATIVE — SIGNIFICANT CHANGE UP
HBA1C BLD-MCNC: 5.6 % — SIGNIFICANT CHANGE UP (ref 4–5.6)
HCO3 BLDA-SCNC: 27 MMOL/L — SIGNIFICANT CHANGE UP (ref 23–27)
HCO3 BLDA-SCNC: 28 MMOL/L — HIGH (ref 23–27)
HCO3 BLDV-SCNC: 27 MMOL/L — SIGNIFICANT CHANGE UP (ref 22–29)
HCO3 BLDV-SCNC: 29 MMOL/L — SIGNIFICANT CHANGE UP (ref 22–29)
HCO3 BLDV-SCNC: 29 MMOL/L — SIGNIFICANT CHANGE UP (ref 22–29)
HCO3 BLDV-SCNC: 30 MMOL/L — HIGH (ref 22–29)
HCO3 BLDV-SCNC: 30 MMOL/L — HIGH (ref 22–29)
HCT VFR BLD CALC: 20.8 % — LOW (ref 42–52)
HCT VFR BLD CALC: 22 % — LOW (ref 42–52)
HCT VFR BLD CALC: 23.3 % — LOW (ref 42–52)
HCT VFR BLD CALC: 23.6 % — LOW (ref 42–52)
HCT VFR BLD CALC: 24.1 % — LOW (ref 42–52)
HCT VFR BLD CALC: 24.5 % — LOW (ref 42–52)
HCT VFR BLD CALC: 25.9 % — LOW (ref 42–52)
HCT VFR BLD CALC: 26 % — LOW (ref 42–52)
HCT VFR BLD CALC: 26.2 % — LOW (ref 42–52)
HCT VFR BLD CALC: 26.4 % — LOW (ref 42–52)
HCT VFR BLD CALC: 26.4 % — LOW (ref 42–52)
HCT VFR BLD CALC: 26.5 % — LOW (ref 42–52)
HCT VFR BLD CALC: 26.5 % — LOW (ref 42–52)
HCT VFR BLD CALC: 26.8 % — LOW (ref 42–52)
HCT VFR BLD CALC: 26.8 % — LOW (ref 42–52)
HCT VFR BLD CALC: 27.6 % — LOW (ref 42–52)
HCT VFR BLD CALC: 27.7 % — LOW (ref 42–52)
HCT VFR BLD CALC: 27.9 % — LOW (ref 42–52)
HCT VFR BLD CALC: 28.6 % — LOW (ref 42–52)
HCT VFR BLD CALC: 28.8 % — LOW (ref 42–52)
HCT VFR BLD CALC: 29.4 % — LOW (ref 42–52)
HCT VFR BLD CALC: 30.6 % — LOW (ref 42–52)
HCT VFR BLD CALC: 31.7 % — LOW (ref 42–52)
HCT VFR BLD CALC: 31.9 % — LOW (ref 42–52)
HCT VFR BLD CALC: 32.2 % — LOW (ref 42–52)
HCT VFR BLD CALC: 33.1 % — LOW (ref 42–52)
HCT VFR BLD CALC: 33.2 % — LOW (ref 42–52)
HCT VFR BLD CALC: 33.3 % — LOW (ref 42–52)
HCT VFR BLD CALC: 33.4 % — LOW (ref 42–52)
HCT VFR BLD CALC: 33.5 % — LOW (ref 42–52)
HCT VFR BLD CALC: 33.7 % — LOW (ref 42–52)
HCT VFR BLD CALC: 34.1 % — LOW (ref 42–52)
HCT VFR BLD CALC: 34.3 % — LOW (ref 42–52)
HCT VFR BLD CALC: 34.7 % — LOW (ref 42–52)
HCT VFR BLD CALC: 36.3 % — LOW (ref 42–52)
HCT VFR BLD CALC: 38 % — LOW (ref 42–52)
HCT VFR BLD CALC: 41.3 % — LOW (ref 42–52)
HCT VFR BLDA CALC: 25.9 % — LOW (ref 34–44)
HCT VFR BLDA CALC: 26.8 % — LOW (ref 34–44)
HCT VFR BLDA CALC: 37.3 % — SIGNIFICANT CHANGE UP (ref 34–44)
HCT VFR BLDA CALC: 43 % — SIGNIFICANT CHANGE UP (ref 34–44)
HCT VFR BLDA CALC: 51.8 % — HIGH (ref 34–44)
HDLC SERPL-MCNC: 29 MG/DL — LOW
HGB BLD CALC-MCNC: 12.2 G/DL — LOW (ref 14–18)
HGB BLD CALC-MCNC: 14 G/DL — SIGNIFICANT CHANGE UP (ref 14–18)
HGB BLD CALC-MCNC: 16.9 G/DL — SIGNIFICANT CHANGE UP (ref 14–18)
HGB BLD CALC-MCNC: 8.4 G/DL — LOW (ref 14–18)
HGB BLD CALC-MCNC: 8.7 G/DL — LOW (ref 14–18)
HGB BLD-MCNC: 10 G/DL — LOW (ref 14–18)
HGB BLD-MCNC: 10.3 G/DL — LOW (ref 14–18)
HGB BLD-MCNC: 10.3 G/DL — LOW (ref 14–18)
HGB BLD-MCNC: 10.7 G/DL — LOW (ref 14–18)
HGB BLD-MCNC: 10.7 G/DL — LOW (ref 14–18)
HGB BLD-MCNC: 10.8 G/DL — LOW (ref 14–18)
HGB BLD-MCNC: 10.8 G/DL — LOW (ref 14–18)
HGB BLD-MCNC: 11 G/DL — LOW (ref 14–18)
HGB BLD-MCNC: 11.3 G/DL — LOW (ref 14–18)
HGB BLD-MCNC: 11.4 G/DL — LOW (ref 14–18)
HGB BLD-MCNC: 11.9 G/DL — LOW (ref 14–18)
HGB BLD-MCNC: 12.3 G/DL — LOW (ref 14–18)
HGB BLD-MCNC: 13.7 G/DL — LOW (ref 14–18)
HGB BLD-MCNC: 6.6 G/DL — CRITICAL LOW (ref 14–18)
HGB BLD-MCNC: 7 G/DL — LOW (ref 14–18)
HGB BLD-MCNC: 7.2 G/DL — LOW (ref 14–18)
HGB BLD-MCNC: 7.6 G/DL — LOW (ref 14–18)
HGB BLD-MCNC: 7.6 G/DL — LOW (ref 14–18)
HGB BLD-MCNC: 7.8 G/DL — LOW (ref 14–18)
HGB BLD-MCNC: 8.1 G/DL — LOW (ref 14–18)
HGB BLD-MCNC: 8.2 G/DL — LOW (ref 14–18)
HGB BLD-MCNC: 8.2 G/DL — LOW (ref 14–18)
HGB BLD-MCNC: 8.3 G/DL — LOW (ref 14–18)
HGB BLD-MCNC: 8.3 G/DL — LOW (ref 14–18)
HGB BLD-MCNC: 8.4 G/DL — LOW (ref 14–18)
HGB BLD-MCNC: 8.6 G/DL — LOW (ref 14–18)
HGB BLD-MCNC: 8.8 G/DL — LOW (ref 14–18)
HGB BLD-MCNC: 8.8 G/DL — LOW (ref 14–18)
HGB BLD-MCNC: 9.1 G/DL — LOW (ref 14–18)
HGB BLD-MCNC: 9.1 G/DL — LOW (ref 14–18)
HGB BLD-MCNC: 9.4 G/DL — LOW (ref 14–18)
HGB BLD-MCNC: 9.4 G/DL — LOW (ref 14–18)
HGB BLD-MCNC: 9.9 G/DL — LOW (ref 14–18)
HOROWITZ INDEX BLDA+IHG-RTO: 32 — SIGNIFICANT CHANGE UP
HYALINE CASTS # UR AUTO: 13 /LPF — HIGH (ref 0–7)
HYALINE CASTS # UR AUTO: 2 /LPF — SIGNIFICANT CHANGE UP (ref 0–7)
HYALINE CASTS # UR AUTO: 4 /LPF — SIGNIFICANT CHANGE UP (ref 0–7)
HYALINE CASTS # UR AUTO: 8 /LPF — HIGH (ref 0–7)
IMM GRANULOCYTES NFR BLD AUTO: 0.2 % — SIGNIFICANT CHANGE UP (ref 0.1–0.3)
IMM GRANULOCYTES NFR BLD AUTO: 0.4 % — HIGH (ref 0.1–0.3)
IMM GRANULOCYTES NFR BLD AUTO: 0.5 % — HIGH (ref 0.1–0.3)
IMM GRANULOCYTES NFR BLD AUTO: 0.6 % — HIGH (ref 0.1–0.3)
IMM GRANULOCYTES NFR BLD AUTO: 0.8 % — HIGH (ref 0.1–0.3)
IMM GRANULOCYTES NFR BLD AUTO: 0.9 % — HIGH (ref 0.1–0.3)
IMM GRANULOCYTES NFR BLD AUTO: 0.9 % — HIGH (ref 0.1–0.3)
IMM GRANULOCYTES NFR BLD AUTO: 1 % — HIGH (ref 0.1–0.3)
IMM GRANULOCYTES NFR BLD AUTO: 1.1 % — HIGH (ref 0.1–0.3)
IMM GRANULOCYTES NFR BLD AUTO: 1.4 % — HIGH (ref 0.1–0.3)
IMM GRANULOCYTES NFR BLD AUTO: 1.9 % — HIGH (ref 0.1–0.3)
INR BLD: 1.33 RATIO — HIGH (ref 0.65–1.3)
INR BLD: 1.35 RATIO — HIGH (ref 0.65–1.3)
INR BLD: 1.4 RATIO — HIGH (ref 0.65–1.3)
INR BLD: 1.63 RATIO — HIGH (ref 0.65–1.3)
INR BLD: 1.69 RATIO — HIGH (ref 0.65–1.3)
IRON SATN MFR SERPL: 18 % — SIGNIFICANT CHANGE UP (ref 15–50)
IRON SATN MFR SERPL: 25 UG/DL — LOW (ref 35–150)
KETONES UR-MCNC: NEGATIVE — SIGNIFICANT CHANGE UP
KETONES UR-MCNC: SIGNIFICANT CHANGE UP
LACTATE BLDV-MCNC: 1.6 MMOL/L — SIGNIFICANT CHANGE UP (ref 0.5–1.6)
LACTATE BLDV-MCNC: 1.7 MMOL/L — HIGH (ref 0.5–1.6)
LACTATE BLDV-MCNC: 1.9 MMOL/L — HIGH (ref 0.5–1.6)
LACTATE BLDV-MCNC: 2.2 MMOL/L — HIGH (ref 0.5–1.6)
LACTATE BLDV-MCNC: 2.3 MMOL/L — HIGH (ref 0.5–1.6)
LACTATE SERPL-SCNC: 1 MMOL/L — SIGNIFICANT CHANGE UP (ref 0.5–2.2)
LACTATE SERPL-SCNC: 1.2 MMOL/L — SIGNIFICANT CHANGE UP (ref 0.5–2.2)
LACTATE SERPL-SCNC: 1.7 MMOL/L — SIGNIFICANT CHANGE UP (ref 0.5–2.2)
LACTATE SERPL-SCNC: 1.9 MMOL/L — SIGNIFICANT CHANGE UP (ref 0.5–2.2)
LACTATE SERPL-SCNC: 2.2 MMOL/L — SIGNIFICANT CHANGE UP (ref 0.5–2.2)
LEGIONELLA AG UR QL: NEGATIVE — SIGNIFICANT CHANGE UP
LEUKOCYTE ESTERASE UR-ACNC: NEGATIVE — SIGNIFICANT CHANGE UP
LIPID PNL WITH DIRECT LDL SERPL: 44 MG/DL — SIGNIFICANT CHANGE UP (ref 4–129)
LYMPHOCYTES # BLD AUTO: 0.68 K/UL — LOW (ref 1.2–3.4)
LYMPHOCYTES # BLD AUTO: 0.77 K/UL — LOW (ref 1.2–3.4)
LYMPHOCYTES # BLD AUTO: 0.92 K/UL — LOW (ref 1.2–3.4)
LYMPHOCYTES # BLD AUTO: 1.06 K/UL — LOW (ref 1.2–3.4)
LYMPHOCYTES # BLD AUTO: 1.13 K/UL — LOW (ref 1.2–3.4)
LYMPHOCYTES # BLD AUTO: 1.15 K/UL — LOW (ref 1.2–3.4)
LYMPHOCYTES # BLD AUTO: 1.23 K/UL — SIGNIFICANT CHANGE UP (ref 1.2–3.4)
LYMPHOCYTES # BLD AUTO: 1.26 K/UL — SIGNIFICANT CHANGE UP (ref 1.2–3.4)
LYMPHOCYTES # BLD AUTO: 1.31 K/UL — SIGNIFICANT CHANGE UP (ref 1.2–3.4)
LYMPHOCYTES # BLD AUTO: 1.34 K/UL — SIGNIFICANT CHANGE UP (ref 1.2–3.4)
LYMPHOCYTES # BLD AUTO: 1.44 K/UL — SIGNIFICANT CHANGE UP (ref 1.2–3.4)
LYMPHOCYTES # BLD AUTO: 1.46 K/UL — SIGNIFICANT CHANGE UP (ref 1.2–3.4)
LYMPHOCYTES # BLD AUTO: 1.48 K/UL — SIGNIFICANT CHANGE UP (ref 1.2–3.4)
LYMPHOCYTES # BLD AUTO: 1.54 K/UL — SIGNIFICANT CHANGE UP (ref 1.2–3.4)
LYMPHOCYTES # BLD AUTO: 1.59 K/UL — SIGNIFICANT CHANGE UP (ref 1.2–3.4)
LYMPHOCYTES # BLD AUTO: 1.68 K/UL — SIGNIFICANT CHANGE UP (ref 1.2–3.4)
LYMPHOCYTES # BLD AUTO: 1.8 % — LOW (ref 20.5–51.1)
LYMPHOCYTES # BLD AUTO: 1.89 K/UL — SIGNIFICANT CHANGE UP (ref 1.2–3.4)
LYMPHOCYTES # BLD AUTO: 1.91 K/UL — SIGNIFICANT CHANGE UP (ref 1.2–3.4)
LYMPHOCYTES # BLD AUTO: 1.93 K/UL — SIGNIFICANT CHANGE UP (ref 1.2–3.4)
LYMPHOCYTES # BLD AUTO: 1.99 K/UL — SIGNIFICANT CHANGE UP (ref 1.2–3.4)
LYMPHOCYTES # BLD AUTO: 10 % — LOW (ref 20.5–51.1)
LYMPHOCYTES # BLD AUTO: 10.5 % — LOW (ref 20.5–51.1)
LYMPHOCYTES # BLD AUTO: 11.3 % — LOW (ref 20.5–51.1)
LYMPHOCYTES # BLD AUTO: 11.6 % — LOW (ref 20.5–51.1)
LYMPHOCYTES # BLD AUTO: 11.7 % — LOW (ref 20.5–51.1)
LYMPHOCYTES # BLD AUTO: 12.1 % — LOW (ref 20.5–51.1)
LYMPHOCYTES # BLD AUTO: 12.5 % — LOW (ref 20.5–51.1)
LYMPHOCYTES # BLD AUTO: 13.5 % — LOW (ref 20.5–51.1)
LYMPHOCYTES # BLD AUTO: 14.2 % — LOW (ref 20.5–51.1)
LYMPHOCYTES # BLD AUTO: 14.9 % — LOW (ref 20.5–51.1)
LYMPHOCYTES # BLD AUTO: 16.7 % — LOW (ref 20.5–51.1)
LYMPHOCYTES # BLD AUTO: 18.4 % — LOW (ref 20.5–51.1)
LYMPHOCYTES # BLD AUTO: 2.06 K/UL — SIGNIFICANT CHANGE UP (ref 1.2–3.4)
LYMPHOCYTES # BLD AUTO: 2.08 K/UL — SIGNIFICANT CHANGE UP (ref 1.2–3.4)
LYMPHOCYTES # BLD AUTO: 2.37 K/UL — SIGNIFICANT CHANGE UP (ref 1.2–3.4)
LYMPHOCYTES # BLD AUTO: 2.58 K/UL — SIGNIFICANT CHANGE UP (ref 1.2–3.4)
LYMPHOCYTES # BLD AUTO: 2.6 % — LOW (ref 20.5–51.1)
LYMPHOCYTES # BLD AUTO: 2.65 K/UL — SIGNIFICANT CHANGE UP (ref 1.2–3.4)
LYMPHOCYTES # BLD AUTO: 2.76 K/UL — SIGNIFICANT CHANGE UP (ref 1.2–3.4)
LYMPHOCYTES # BLD AUTO: 2.82 K/UL — SIGNIFICANT CHANGE UP (ref 1.2–3.4)
LYMPHOCYTES # BLD AUTO: 20.9 % — SIGNIFICANT CHANGE UP (ref 20.5–51.1)
LYMPHOCYTES # BLD AUTO: 21.7 % — SIGNIFICANT CHANGE UP (ref 20.5–51.1)
LYMPHOCYTES # BLD AUTO: 23.4 % — SIGNIFICANT CHANGE UP (ref 20.5–51.1)
LYMPHOCYTES # BLD AUTO: 23.4 % — SIGNIFICANT CHANGE UP (ref 20.5–51.1)
LYMPHOCYTES # BLD AUTO: 27.2 % — SIGNIFICANT CHANGE UP (ref 20.5–51.1)
LYMPHOCYTES # BLD AUTO: 28.9 % — SIGNIFICANT CHANGE UP (ref 20.5–51.1)
LYMPHOCYTES # BLD AUTO: 5.2 % — LOW (ref 20.5–51.1)
LYMPHOCYTES # BLD AUTO: 6.3 % — LOW (ref 20.5–51.1)
LYMPHOCYTES # BLD AUTO: 6.9 % — LOW (ref 20.5–51.1)
LYMPHOCYTES # BLD AUTO: 7 % — LOW (ref 20.5–51.1)
LYMPHOCYTES # BLD AUTO: 7.8 % — LOW (ref 20.5–51.1)
LYMPHOCYTES # BLD AUTO: 9.2 % — LOW (ref 20.5–51.1)
LYMPHOCYTES # BLD AUTO: 9.6 % — LOW (ref 20.5–51.1)
MAGNESIUM SERPL-MCNC: 1.7 MG/DL — LOW (ref 1.8–2.4)
MAGNESIUM SERPL-MCNC: 1.9 MG/DL — SIGNIFICANT CHANGE UP (ref 1.8–2.4)
MAGNESIUM SERPL-MCNC: 2 MG/DL — SIGNIFICANT CHANGE UP (ref 1.8–2.4)
MAGNESIUM SERPL-MCNC: 2.1 MG/DL — SIGNIFICANT CHANGE UP (ref 1.8–2.4)
MAGNESIUM SERPL-MCNC: 2.2 MG/DL — SIGNIFICANT CHANGE UP (ref 1.8–2.4)
MAGNESIUM SERPL-MCNC: 2.5 MG/DL — HIGH (ref 1.8–2.4)
MAGNESIUM SERPL-MCNC: 2.9 MG/DL — HIGH (ref 1.8–2.4)
MANUAL SMEAR VERIFICATION: SIGNIFICANT CHANGE UP
MANUAL SMEAR VERIFICATION: SIGNIFICANT CHANGE UP
MCHC RBC-ENTMCNC: 26.3 PG — LOW (ref 27–31)
MCHC RBC-ENTMCNC: 26.3 PG — LOW (ref 27–31)
MCHC RBC-ENTMCNC: 26.4 PG — LOW (ref 27–31)
MCHC RBC-ENTMCNC: 26.5 PG — LOW (ref 27–31)
MCHC RBC-ENTMCNC: 26.7 PG — LOW (ref 27–31)
MCHC RBC-ENTMCNC: 26.7 PG — LOW (ref 27–31)
MCHC RBC-ENTMCNC: 26.8 PG — LOW (ref 27–31)
MCHC RBC-ENTMCNC: 26.9 PG — LOW (ref 27–31)
MCHC RBC-ENTMCNC: 27.1 PG — SIGNIFICANT CHANGE UP (ref 27–31)
MCHC RBC-ENTMCNC: 27.2 PG — SIGNIFICANT CHANGE UP (ref 27–31)
MCHC RBC-ENTMCNC: 27.3 PG — SIGNIFICANT CHANGE UP (ref 27–31)
MCHC RBC-ENTMCNC: 27.4 PG — SIGNIFICANT CHANGE UP (ref 27–31)
MCHC RBC-ENTMCNC: 27.5 PG — SIGNIFICANT CHANGE UP (ref 27–31)
MCHC RBC-ENTMCNC: 27.6 PG — SIGNIFICANT CHANGE UP (ref 27–31)
MCHC RBC-ENTMCNC: 27.6 PG — SIGNIFICANT CHANGE UP (ref 27–31)
MCHC RBC-ENTMCNC: 27.7 PG — SIGNIFICANT CHANGE UP (ref 27–31)
MCHC RBC-ENTMCNC: 28 PG — SIGNIFICANT CHANGE UP (ref 27–31)
MCHC RBC-ENTMCNC: 28 PG — SIGNIFICANT CHANGE UP (ref 27–31)
MCHC RBC-ENTMCNC: 28.5 PG — SIGNIFICANT CHANGE UP (ref 27–31)
MCHC RBC-ENTMCNC: 28.5 PG — SIGNIFICANT CHANGE UP (ref 27–31)
MCHC RBC-ENTMCNC: 28.7 PG — SIGNIFICANT CHANGE UP (ref 27–31)
MCHC RBC-ENTMCNC: 28.7 PG — SIGNIFICANT CHANGE UP (ref 27–31)
MCHC RBC-ENTMCNC: 29 PG — SIGNIFICANT CHANGE UP (ref 27–31)
MCHC RBC-ENTMCNC: 29 PG — SIGNIFICANT CHANGE UP (ref 27–31)
MCHC RBC-ENTMCNC: 29.2 PG — SIGNIFICANT CHANGE UP (ref 27–31)
MCHC RBC-ENTMCNC: 29.5 PG — SIGNIFICANT CHANGE UP (ref 27–31)
MCHC RBC-ENTMCNC: 29.5 PG — SIGNIFICANT CHANGE UP (ref 27–31)
MCHC RBC-ENTMCNC: 29.6 PG — SIGNIFICANT CHANGE UP (ref 27–31)
MCHC RBC-ENTMCNC: 29.6 PG — SIGNIFICANT CHANGE UP (ref 27–31)
MCHC RBC-ENTMCNC: 30.6 G/DL — LOW (ref 32–37)
MCHC RBC-ENTMCNC: 30.7 G/DL — LOW (ref 32–37)
MCHC RBC-ENTMCNC: 30.9 G/DL — LOW (ref 32–37)
MCHC RBC-ENTMCNC: 30.9 G/DL — LOW (ref 32–37)
MCHC RBC-ENTMCNC: 31 G/DL — LOW (ref 32–37)
MCHC RBC-ENTMCNC: 31 G/DL — LOW (ref 32–37)
MCHC RBC-ENTMCNC: 31.1 G/DL — LOW (ref 32–37)
MCHC RBC-ENTMCNC: 31.4 G/DL — LOW (ref 32–37)
MCHC RBC-ENTMCNC: 31.5 G/DL — LOW (ref 32–37)
MCHC RBC-ENTMCNC: 31.5 G/DL — LOW (ref 32–37)
MCHC RBC-ENTMCNC: 31.7 G/DL — LOW (ref 32–37)
MCHC RBC-ENTMCNC: 31.8 G/DL — LOW (ref 32–37)
MCHC RBC-ENTMCNC: 31.8 G/DL — LOW (ref 32–37)
MCHC RBC-ENTMCNC: 31.9 G/DL — LOW (ref 32–37)
MCHC RBC-ENTMCNC: 32 G/DL — SIGNIFICANT CHANGE UP (ref 32–37)
MCHC RBC-ENTMCNC: 32.1 G/DL — SIGNIFICANT CHANGE UP (ref 32–37)
MCHC RBC-ENTMCNC: 32.2 G/DL — SIGNIFICANT CHANGE UP (ref 32–37)
MCHC RBC-ENTMCNC: 32.3 G/DL — SIGNIFICANT CHANGE UP (ref 32–37)
MCHC RBC-ENTMCNC: 32.4 G/DL — SIGNIFICANT CHANGE UP (ref 32–37)
MCHC RBC-ENTMCNC: 32.4 G/DL — SIGNIFICANT CHANGE UP (ref 32–37)
MCHC RBC-ENTMCNC: 32.5 G/DL — SIGNIFICANT CHANGE UP (ref 32–37)
MCHC RBC-ENTMCNC: 32.6 G/DL — SIGNIFICANT CHANGE UP (ref 32–37)
MCHC RBC-ENTMCNC: 32.7 G/DL — SIGNIFICANT CHANGE UP (ref 32–37)
MCHC RBC-ENTMCNC: 32.8 G/DL — SIGNIFICANT CHANGE UP (ref 32–37)
MCHC RBC-ENTMCNC: 32.9 G/DL — SIGNIFICANT CHANGE UP (ref 32–37)
MCHC RBC-ENTMCNC: 33.1 G/DL — SIGNIFICANT CHANGE UP (ref 32–37)
MCHC RBC-ENTMCNC: 33.1 G/DL — SIGNIFICANT CHANGE UP (ref 32–37)
MCHC RBC-ENTMCNC: 33.2 G/DL — SIGNIFICANT CHANGE UP (ref 32–37)
MCHC RBC-ENTMCNC: 33.2 G/DL — SIGNIFICANT CHANGE UP (ref 32–37)
MCV RBC AUTO: 83.1 FL — SIGNIFICANT CHANGE UP (ref 80–94)
MCV RBC AUTO: 83.1 FL — SIGNIFICANT CHANGE UP (ref 80–94)
MCV RBC AUTO: 84 FL — SIGNIFICANT CHANGE UP (ref 80–94)
MCV RBC AUTO: 84 FL — SIGNIFICANT CHANGE UP (ref 80–94)
MCV RBC AUTO: 84.1 FL — SIGNIFICANT CHANGE UP (ref 80–94)
MCV RBC AUTO: 84.1 FL — SIGNIFICANT CHANGE UP (ref 80–94)
MCV RBC AUTO: 84.2 FL — SIGNIFICANT CHANGE UP (ref 80–94)
MCV RBC AUTO: 84.4 FL — SIGNIFICANT CHANGE UP (ref 80–94)
MCV RBC AUTO: 84.5 FL — SIGNIFICANT CHANGE UP (ref 80–94)
MCV RBC AUTO: 84.7 FL — SIGNIFICANT CHANGE UP (ref 80–94)
MCV RBC AUTO: 84.7 FL — SIGNIFICANT CHANGE UP (ref 80–94)
MCV RBC AUTO: 84.9 FL — SIGNIFICANT CHANGE UP (ref 80–94)
MCV RBC AUTO: 85.1 FL — SIGNIFICANT CHANGE UP (ref 80–94)
MCV RBC AUTO: 85.3 FL — SIGNIFICANT CHANGE UP (ref 80–94)
MCV RBC AUTO: 85.5 FL — SIGNIFICANT CHANGE UP (ref 80–94)
MCV RBC AUTO: 85.6 FL — SIGNIFICANT CHANGE UP (ref 80–94)
MCV RBC AUTO: 85.6 FL — SIGNIFICANT CHANGE UP (ref 80–94)
MCV RBC AUTO: 85.7 FL — SIGNIFICANT CHANGE UP (ref 80–94)
MCV RBC AUTO: 86 FL — SIGNIFICANT CHANGE UP (ref 80–94)
MCV RBC AUTO: 86.6 FL — SIGNIFICANT CHANGE UP (ref 80–94)
MCV RBC AUTO: 86.8 FL — SIGNIFICANT CHANGE UP (ref 80–94)
MCV RBC AUTO: 87 FL — SIGNIFICANT CHANGE UP (ref 80–94)
MCV RBC AUTO: 87.1 FL — SIGNIFICANT CHANGE UP (ref 80–94)
MCV RBC AUTO: 87.6 FL — SIGNIFICANT CHANGE UP (ref 80–94)
MCV RBC AUTO: 87.6 FL — SIGNIFICANT CHANGE UP (ref 80–94)
MCV RBC AUTO: 88.1 FL — SIGNIFICANT CHANGE UP (ref 80–94)
MCV RBC AUTO: 88.2 FL — SIGNIFICANT CHANGE UP (ref 80–94)
MCV RBC AUTO: 88.6 FL — SIGNIFICANT CHANGE UP (ref 80–94)
MCV RBC AUTO: 88.7 FL — SIGNIFICANT CHANGE UP (ref 80–94)
MCV RBC AUTO: 88.8 FL — SIGNIFICANT CHANGE UP (ref 80–94)
MCV RBC AUTO: 89 FL — SIGNIFICANT CHANGE UP (ref 80–94)
MCV RBC AUTO: 89.2 FL — SIGNIFICANT CHANGE UP (ref 80–94)
MCV RBC AUTO: 89.3 FL — SIGNIFICANT CHANGE UP (ref 80–94)
MCV RBC AUTO: 89.6 FL — SIGNIFICANT CHANGE UP (ref 80–94)
MCV RBC AUTO: 89.9 FL — SIGNIFICANT CHANGE UP (ref 80–94)
MCV RBC AUTO: 90.5 FL — SIGNIFICANT CHANGE UP (ref 80–94)
MCV RBC AUTO: 91 FL — SIGNIFICANT CHANGE UP (ref 80–94)
MONOCYTES # BLD AUTO: 0.41 K/UL — SIGNIFICANT CHANGE UP (ref 0.1–0.6)
MONOCYTES # BLD AUTO: 0.57 K/UL — SIGNIFICANT CHANGE UP (ref 0.1–0.6)
MONOCYTES # BLD AUTO: 0.72 K/UL — HIGH (ref 0.1–0.6)
MONOCYTES # BLD AUTO: 0.72 K/UL — HIGH (ref 0.1–0.6)
MONOCYTES # BLD AUTO: 0.73 K/UL — HIGH (ref 0.1–0.6)
MONOCYTES # BLD AUTO: 0.77 K/UL — HIGH (ref 0.1–0.6)
MONOCYTES # BLD AUTO: 0.82 K/UL — HIGH (ref 0.1–0.6)
MONOCYTES # BLD AUTO: 0.84 K/UL — HIGH (ref 0.1–0.6)
MONOCYTES # BLD AUTO: 0.86 K/UL — HIGH (ref 0.1–0.6)
MONOCYTES # BLD AUTO: 0.87 K/UL — HIGH (ref 0.1–0.6)
MONOCYTES # BLD AUTO: 0.92 K/UL — HIGH (ref 0.1–0.6)
MONOCYTES # BLD AUTO: 0.94 K/UL — HIGH (ref 0.1–0.6)
MONOCYTES # BLD AUTO: 0.96 K/UL — HIGH (ref 0.1–0.6)
MONOCYTES # BLD AUTO: 0.97 K/UL — HIGH (ref 0.1–0.6)
MONOCYTES # BLD AUTO: 0.97 K/UL — HIGH (ref 0.1–0.6)
MONOCYTES # BLD AUTO: 1.02 K/UL — HIGH (ref 0.1–0.6)
MONOCYTES # BLD AUTO: 1.04 K/UL — HIGH (ref 0.1–0.6)
MONOCYTES # BLD AUTO: 1.05 K/UL — HIGH (ref 0.1–0.6)
MONOCYTES # BLD AUTO: 1.06 K/UL — HIGH (ref 0.1–0.6)
MONOCYTES # BLD AUTO: 1.08 K/UL — HIGH (ref 0.1–0.6)
MONOCYTES # BLD AUTO: 1.14 K/UL — HIGH (ref 0.1–0.6)
MONOCYTES # BLD AUTO: 1.19 K/UL — HIGH (ref 0.1–0.6)
MONOCYTES # BLD AUTO: 1.21 K/UL — HIGH (ref 0.1–0.6)
MONOCYTES # BLD AUTO: 1.28 K/UL — HIGH (ref 0.1–0.6)
MONOCYTES # BLD AUTO: 1.31 K/UL — HIGH (ref 0.1–0.6)
MONOCYTES # BLD AUTO: 1.36 K/UL — HIGH (ref 0.1–0.6)
MONOCYTES # BLD AUTO: 1.61 K/UL — HIGH (ref 0.1–0.6)
MONOCYTES NFR BLD AUTO: 2.3 % — SIGNIFICANT CHANGE UP (ref 1.7–9.3)
MONOCYTES NFR BLD AUTO: 2.4 % — SIGNIFICANT CHANGE UP (ref 1.7–9.3)
MONOCYTES NFR BLD AUTO: 2.6 % — SIGNIFICANT CHANGE UP (ref 1.7–9.3)
MONOCYTES NFR BLD AUTO: 5 % — SIGNIFICANT CHANGE UP (ref 1.7–9.3)
MONOCYTES NFR BLD AUTO: 5.9 % — SIGNIFICANT CHANGE UP (ref 1.7–9.3)
MONOCYTES NFR BLD AUTO: 6 % — SIGNIFICANT CHANGE UP (ref 1.7–9.3)
MONOCYTES NFR BLD AUTO: 6.6 % — SIGNIFICANT CHANGE UP (ref 1.7–9.3)
MONOCYTES NFR BLD AUTO: 6.6 % — SIGNIFICANT CHANGE UP (ref 1.7–9.3)
MONOCYTES NFR BLD AUTO: 6.9 % — SIGNIFICANT CHANGE UP (ref 1.7–9.3)
MONOCYTES NFR BLD AUTO: 7.1 % — SIGNIFICANT CHANGE UP (ref 1.7–9.3)
MONOCYTES NFR BLD AUTO: 7.5 % — SIGNIFICANT CHANGE UP (ref 1.7–9.3)
MONOCYTES NFR BLD AUTO: 7.7 % — SIGNIFICANT CHANGE UP (ref 1.7–9.3)
MONOCYTES NFR BLD AUTO: 7.8 % — SIGNIFICANT CHANGE UP (ref 1.7–9.3)
MONOCYTES NFR BLD AUTO: 8 % — SIGNIFICANT CHANGE UP (ref 1.7–9.3)
MONOCYTES NFR BLD AUTO: 8 % — SIGNIFICANT CHANGE UP (ref 1.7–9.3)
MONOCYTES NFR BLD AUTO: 8.2 % — SIGNIFICANT CHANGE UP (ref 1.7–9.3)
MONOCYTES NFR BLD AUTO: 8.3 % — SIGNIFICANT CHANGE UP (ref 1.7–9.3)
MONOCYTES NFR BLD AUTO: 8.5 % — SIGNIFICANT CHANGE UP (ref 1.7–9.3)
MONOCYTES NFR BLD AUTO: 8.7 % — SIGNIFICANT CHANGE UP (ref 1.7–9.3)
MONOCYTES NFR BLD AUTO: 8.8 % — SIGNIFICANT CHANGE UP (ref 1.7–9.3)
MONOCYTES NFR BLD AUTO: 8.9 % — SIGNIFICANT CHANGE UP (ref 1.7–9.3)
MONOCYTES NFR BLD AUTO: 9.5 % — HIGH (ref 1.7–9.3)
MONOCYTES NFR BLD AUTO: 9.6 % — HIGH (ref 1.7–9.3)
MRSA PCR RESULT.: NEGATIVE — SIGNIFICANT CHANGE UP
MRSA PCR RESULT.: POSITIVE
NEUTROPHILS # BLD AUTO: 10.12 K/UL — HIGH (ref 1.4–6.5)
NEUTROPHILS # BLD AUTO: 11.04 K/UL — HIGH (ref 1.4–6.5)
NEUTROPHILS # BLD AUTO: 11.52 K/UL — HIGH (ref 1.4–6.5)
NEUTROPHILS # BLD AUTO: 11.89 K/UL — HIGH (ref 1.4–6.5)
NEUTROPHILS # BLD AUTO: 12.29 K/UL — HIGH (ref 1.4–6.5)
NEUTROPHILS # BLD AUTO: 12.33 K/UL — HIGH (ref 1.4–6.5)
NEUTROPHILS # BLD AUTO: 12.53 K/UL — HIGH (ref 1.4–6.5)
NEUTROPHILS # BLD AUTO: 12.61 K/UL — HIGH (ref 1.4–6.5)
NEUTROPHILS # BLD AUTO: 13.04 K/UL — HIGH (ref 1.4–6.5)
NEUTROPHILS # BLD AUTO: 14.23 K/UL — HIGH (ref 1.4–6.5)
NEUTROPHILS # BLD AUTO: 14.93 K/UL — HIGH (ref 1.4–6.5)
NEUTROPHILS # BLD AUTO: 15.46 K/UL — HIGH (ref 1.4–6.5)
NEUTROPHILS # BLD AUTO: 16.33 K/UL — HIGH (ref 1.4–6.5)
NEUTROPHILS # BLD AUTO: 20.05 K/UL — HIGH (ref 1.4–6.5)
NEUTROPHILS # BLD AUTO: 27.99 K/UL — HIGH (ref 1.4–6.5)
NEUTROPHILS # BLD AUTO: 3.94 K/UL — SIGNIFICANT CHANGE UP (ref 1.4–6.5)
NEUTROPHILS # BLD AUTO: 35.37 K/UL — HIGH (ref 1.4–6.5)
NEUTROPHILS # BLD AUTO: 4.66 K/UL — SIGNIFICANT CHANGE UP (ref 1.4–6.5)
NEUTROPHILS # BLD AUTO: 5.47 K/UL — SIGNIFICANT CHANGE UP (ref 1.4–6.5)
NEUTROPHILS # BLD AUTO: 5.95 K/UL — SIGNIFICANT CHANGE UP (ref 1.4–6.5)
NEUTROPHILS # BLD AUTO: 7.44 K/UL — HIGH (ref 1.4–6.5)
NEUTROPHILS # BLD AUTO: 7.75 K/UL — HIGH (ref 1.4–6.5)
NEUTROPHILS # BLD AUTO: 8.32 K/UL — HIGH (ref 1.4–6.5)
NEUTROPHILS # BLD AUTO: 8.32 K/UL — HIGH (ref 1.4–6.5)
NEUTROPHILS # BLD AUTO: 8.47 K/UL — HIGH (ref 1.4–6.5)
NEUTROPHILS # BLD AUTO: 8.76 K/UL — HIGH (ref 1.4–6.5)
NEUTROPHILS # BLD AUTO: 9.74 K/UL — HIGH (ref 1.4–6.5)
NEUTROPHILS NFR BLD AUTO: 60.3 % — SIGNIFICANT CHANGE UP (ref 42.2–75.2)
NEUTROPHILS NFR BLD AUTO: 61.5 % — SIGNIFICANT CHANGE UP (ref 42.2–75.2)
NEUTROPHILS NFR BLD AUTO: 67 % — SIGNIFICANT CHANGE UP (ref 42.2–75.2)
NEUTROPHILS NFR BLD AUTO: 67.5 % — SIGNIFICANT CHANGE UP (ref 42.2–75.2)
NEUTROPHILS NFR BLD AUTO: 68 % — SIGNIFICANT CHANGE UP (ref 42.2–75.2)
NEUTROPHILS NFR BLD AUTO: 68.1 % — SIGNIFICANT CHANGE UP (ref 42.2–75.2)
NEUTROPHILS NFR BLD AUTO: 73.8 % — SIGNIFICANT CHANGE UP (ref 42.2–75.2)
NEUTROPHILS NFR BLD AUTO: 74.3 % — SIGNIFICANT CHANGE UP (ref 42.2–75.2)
NEUTROPHILS NFR BLD AUTO: 74.6 % — SIGNIFICANT CHANGE UP (ref 42.2–75.2)
NEUTROPHILS NFR BLD AUTO: 75.5 % — HIGH (ref 42.2–75.2)
NEUTROPHILS NFR BLD AUTO: 76.5 % — HIGH (ref 42.2–75.2)
NEUTROPHILS NFR BLD AUTO: 77.4 % — HIGH (ref 42.2–75.2)
NEUTROPHILS NFR BLD AUTO: 78.1 % — HIGH (ref 42.2–75.2)
NEUTROPHILS NFR BLD AUTO: 78.8 % — HIGH (ref 42.2–75.2)
NEUTROPHILS NFR BLD AUTO: 79 % — HIGH (ref 42.2–75.2)
NEUTROPHILS NFR BLD AUTO: 80.4 % — HIGH (ref 42.2–75.2)
NEUTROPHILS NFR BLD AUTO: 80.6 % — HIGH (ref 42.2–75.2)
NEUTROPHILS NFR BLD AUTO: 80.9 % — HIGH (ref 42.2–75.2)
NEUTROPHILS NFR BLD AUTO: 81.9 % — HIGH (ref 42.2–75.2)
NEUTROPHILS NFR BLD AUTO: 82.9 % — HIGH (ref 42.2–75.2)
NEUTROPHILS NFR BLD AUTO: 84.4 % — HIGH (ref 42.2–75.2)
NEUTROPHILS NFR BLD AUTO: 84.7 % — HIGH (ref 42.2–75.2)
NEUTROPHILS NFR BLD AUTO: 88 % — HIGH (ref 42.2–75.2)
NEUTROPHILS NFR BLD AUTO: 88.2 % — HIGH (ref 42.2–75.2)
NEUTROPHILS NFR BLD AUTO: 89.8 % — HIGH (ref 42.2–75.2)
NEUTROPHILS NFR BLD AUTO: 93.9 % — HIGH (ref 42.2–75.2)
NEUTROPHILS NFR BLD AUTO: 94.2 % — HIGH (ref 42.2–75.2)
NEUTS BAND # BLD: 0.9 % — SIGNIFICANT CHANGE UP (ref 0–6)
NITRITE UR-MCNC: NEGATIVE — SIGNIFICANT CHANGE UP
NRBC # BLD: 0 /100 WBCS — SIGNIFICANT CHANGE UP (ref 0–0)
NRBC # BLD: 0 /100 — SIGNIFICANT CHANGE UP (ref 0–0)
NRBC # BLD: SIGNIFICANT CHANGE UP /100 WBCS (ref 0–0)
NT-PROBNP SERPL-SCNC: 949 PG/ML — HIGH (ref 0–300)
PCO2 BLDA: 35 MMHG — LOW (ref 38–42)
PCO2 BLDA: 35 MMHG — LOW (ref 38–42)
PCO2 BLDV: 40 MMHG — LOW (ref 41–51)
PCO2 BLDV: 41 MMHG — SIGNIFICANT CHANGE UP (ref 41–51)
PCO2 BLDV: 41 MMHG — SIGNIFICANT CHANGE UP (ref 41–51)
PCO2 BLDV: 42 MMHG — SIGNIFICANT CHANGE UP (ref 41–51)
PCO2 BLDV: 50 MMHG — SIGNIFICANT CHANGE UP (ref 41–51)
PH BLDA: 7.49 — HIGH (ref 7.38–7.42)
PH BLDA: 7.51 — HIGH (ref 7.38–7.42)
PH BLDV: 7.39 — SIGNIFICANT CHANGE UP (ref 7.26–7.43)
PH BLDV: 7.43 — SIGNIFICANT CHANGE UP (ref 7.26–7.43)
PH BLDV: 7.45 — HIGH (ref 7.26–7.43)
PH BLDV: 7.46 — HIGH (ref 7.26–7.43)
PH BLDV: 7.47 — HIGH (ref 7.26–7.43)
PH UR: 6.5 — SIGNIFICANT CHANGE UP (ref 5–8)
PHOSPHATE SERPL-MCNC: 2.5 MG/DL — SIGNIFICANT CHANGE UP (ref 2.1–4.9)
PHOSPHATE SERPL-MCNC: 2.6 MG/DL — SIGNIFICANT CHANGE UP (ref 2.1–4.9)
PHOSPHATE SERPL-MCNC: 2.9 MG/DL — SIGNIFICANT CHANGE UP (ref 2.1–4.9)
PHOSPHATE SERPL-MCNC: 3.5 MG/DL — SIGNIFICANT CHANGE UP (ref 2.1–4.9)
PHOSPHATE SERPL-MCNC: 3.5 MG/DL — SIGNIFICANT CHANGE UP (ref 2.1–4.9)
PHOSPHATE SERPL-MCNC: 3.6 MG/DL — SIGNIFICANT CHANGE UP (ref 2.1–4.9)
PHOSPHATE SERPL-MCNC: 3.7 MG/DL — SIGNIFICANT CHANGE UP (ref 2.1–4.9)
PLAT MORPH BLD: NORMAL — SIGNIFICANT CHANGE UP
PLAT MORPH BLD: NORMAL — SIGNIFICANT CHANGE UP
PLATELET # BLD AUTO: 144 K/UL — SIGNIFICANT CHANGE UP (ref 130–400)
PLATELET # BLD AUTO: 147 K/UL — SIGNIFICANT CHANGE UP (ref 130–400)
PLATELET # BLD AUTO: 157 K/UL — SIGNIFICANT CHANGE UP (ref 130–400)
PLATELET # BLD AUTO: 164 K/UL — SIGNIFICANT CHANGE UP (ref 130–400)
PLATELET # BLD AUTO: 170 K/UL — SIGNIFICANT CHANGE UP (ref 130–400)
PLATELET # BLD AUTO: 170 K/UL — SIGNIFICANT CHANGE UP (ref 130–400)
PLATELET # BLD AUTO: 171 K/UL — SIGNIFICANT CHANGE UP (ref 130–400)
PLATELET # BLD AUTO: 179 K/UL — SIGNIFICANT CHANGE UP (ref 130–400)
PLATELET # BLD AUTO: 185 K/UL — SIGNIFICANT CHANGE UP (ref 130–400)
PLATELET # BLD AUTO: 191 K/UL — SIGNIFICANT CHANGE UP (ref 130–400)
PLATELET # BLD AUTO: 194 K/UL — SIGNIFICANT CHANGE UP (ref 130–400)
PLATELET # BLD AUTO: 195 K/UL — SIGNIFICANT CHANGE UP (ref 130–400)
PLATELET # BLD AUTO: 197 K/UL — SIGNIFICANT CHANGE UP (ref 130–400)
PLATELET # BLD AUTO: 214 K/UL — SIGNIFICANT CHANGE UP (ref 130–400)
PLATELET # BLD AUTO: 224 K/UL — SIGNIFICANT CHANGE UP (ref 130–400)
PLATELET # BLD AUTO: 245 K/UL — SIGNIFICANT CHANGE UP (ref 130–400)
PLATELET # BLD AUTO: 250 K/UL — SIGNIFICANT CHANGE UP (ref 130–400)
PLATELET # BLD AUTO: 264 K/UL — SIGNIFICANT CHANGE UP (ref 130–400)
PLATELET # BLD AUTO: 267 K/UL — SIGNIFICANT CHANGE UP (ref 130–400)
PLATELET # BLD AUTO: 275 K/UL — SIGNIFICANT CHANGE UP (ref 130–400)
PLATELET # BLD AUTO: 279 K/UL — SIGNIFICANT CHANGE UP (ref 130–400)
PLATELET # BLD AUTO: 282 K/UL — SIGNIFICANT CHANGE UP (ref 130–400)
PLATELET # BLD AUTO: 293 K/UL — SIGNIFICANT CHANGE UP (ref 130–400)
PLATELET # BLD AUTO: 295 K/UL — SIGNIFICANT CHANGE UP (ref 130–400)
PLATELET # BLD AUTO: 300 K/UL — SIGNIFICANT CHANGE UP (ref 130–400)
PLATELET # BLD AUTO: 301 K/UL — SIGNIFICANT CHANGE UP (ref 130–400)
PLATELET # BLD AUTO: 306 K/UL — SIGNIFICANT CHANGE UP (ref 130–400)
PLATELET # BLD AUTO: 309 K/UL — SIGNIFICANT CHANGE UP (ref 130–400)
PLATELET # BLD AUTO: 328 K/UL — SIGNIFICANT CHANGE UP (ref 130–400)
PLATELET # BLD AUTO: 342 K/UL — SIGNIFICANT CHANGE UP (ref 130–400)
PLATELET # BLD AUTO: 355 K/UL — SIGNIFICANT CHANGE UP (ref 130–400)
PLATELET # BLD AUTO: 379 K/UL — SIGNIFICANT CHANGE UP (ref 130–400)
PLATELET # BLD AUTO: 396 K/UL — SIGNIFICANT CHANGE UP (ref 130–400)
PLATELET # BLD AUTO: 396 K/UL — SIGNIFICANT CHANGE UP (ref 130–400)
PLATELET # BLD AUTO: 412 K/UL — HIGH (ref 130–400)
PLATELET # BLD AUTO: 413 K/UL — HIGH (ref 130–400)
PLATELET # BLD AUTO: 425 K/UL — HIGH (ref 130–400)
PO2 BLDA: 100 MMHG — HIGH (ref 78–95)
PO2 BLDA: 87 MMHG — SIGNIFICANT CHANGE UP (ref 78–95)
PO2 BLDV: 120 MMHG — HIGH (ref 20–40)
PO2 BLDV: 16 MMHG — LOW (ref 20–40)
PO2 BLDV: 18 MMHG — LOW (ref 20–40)
PO2 BLDV: 26 MMHG — SIGNIFICANT CHANGE UP (ref 20–40)
PO2 BLDV: 68 MMHG — HIGH (ref 20–40)
POTASSIUM BLDV-SCNC: 3.5 MMOL/L — SIGNIFICANT CHANGE UP (ref 3.3–5.6)
POTASSIUM BLDV-SCNC: 3.5 MMOL/L — SIGNIFICANT CHANGE UP (ref 3.3–5.6)
POTASSIUM BLDV-SCNC: 3.9 MMOL/L — SIGNIFICANT CHANGE UP (ref 3.3–5.6)
POTASSIUM BLDV-SCNC: 3.9 MMOL/L — SIGNIFICANT CHANGE UP (ref 3.3–5.6)
POTASSIUM BLDV-SCNC: 4 MMOL/L — SIGNIFICANT CHANGE UP (ref 3.3–5.6)
POTASSIUM SERPL-MCNC: 3.3 MMOL/L — LOW (ref 3.5–5)
POTASSIUM SERPL-MCNC: 3.4 MMOL/L — LOW (ref 3.5–5)
POTASSIUM SERPL-MCNC: 3.5 MMOL/L — SIGNIFICANT CHANGE UP (ref 3.5–5)
POTASSIUM SERPL-MCNC: 3.6 MMOL/L — SIGNIFICANT CHANGE UP (ref 3.5–5)
POTASSIUM SERPL-MCNC: 3.6 MMOL/L — SIGNIFICANT CHANGE UP (ref 3.5–5)
POTASSIUM SERPL-MCNC: 3.8 MMOL/L — SIGNIFICANT CHANGE UP (ref 3.5–5)
POTASSIUM SERPL-MCNC: 3.8 MMOL/L — SIGNIFICANT CHANGE UP (ref 3.5–5)
POTASSIUM SERPL-MCNC: 3.9 MMOL/L — SIGNIFICANT CHANGE UP (ref 3.5–5)
POTASSIUM SERPL-MCNC: 4 MMOL/L — SIGNIFICANT CHANGE UP (ref 3.5–5)
POTASSIUM SERPL-MCNC: 4.1 MMOL/L — SIGNIFICANT CHANGE UP (ref 3.5–5)
POTASSIUM SERPL-MCNC: 4.2 MMOL/L — SIGNIFICANT CHANGE UP (ref 3.5–5)
POTASSIUM SERPL-MCNC: 4.3 MMOL/L — SIGNIFICANT CHANGE UP (ref 3.5–5)
POTASSIUM SERPL-MCNC: 4.4 MMOL/L — SIGNIFICANT CHANGE UP (ref 3.5–5)
POTASSIUM SERPL-MCNC: 4.5 MMOL/L — SIGNIFICANT CHANGE UP (ref 3.5–5)
POTASSIUM SERPL-MCNC: 4.6 MMOL/L — SIGNIFICANT CHANGE UP (ref 3.5–5)
POTASSIUM SERPL-MCNC: 4.7 MMOL/L — SIGNIFICANT CHANGE UP (ref 3.5–5)
POTASSIUM SERPL-MCNC: 4.8 MMOL/L — SIGNIFICANT CHANGE UP (ref 3.5–5)
POTASSIUM SERPL-MCNC: 5 MMOL/L — SIGNIFICANT CHANGE UP (ref 3.5–5)
POTASSIUM SERPL-MCNC: 5.2 MMOL/L — HIGH (ref 3.5–5)
POTASSIUM SERPL-MCNC: 5.2 MMOL/L — HIGH (ref 3.5–5)
POTASSIUM SERPL-SCNC: 3.3 MMOL/L — LOW (ref 3.5–5)
POTASSIUM SERPL-SCNC: 3.4 MMOL/L — LOW (ref 3.5–5)
POTASSIUM SERPL-SCNC: 3.5 MMOL/L — SIGNIFICANT CHANGE UP (ref 3.5–5)
POTASSIUM SERPL-SCNC: 3.6 MMOL/L — SIGNIFICANT CHANGE UP (ref 3.5–5)
POTASSIUM SERPL-SCNC: 3.6 MMOL/L — SIGNIFICANT CHANGE UP (ref 3.5–5)
POTASSIUM SERPL-SCNC: 3.8 MMOL/L — SIGNIFICANT CHANGE UP (ref 3.5–5)
POTASSIUM SERPL-SCNC: 3.8 MMOL/L — SIGNIFICANT CHANGE UP (ref 3.5–5)
POTASSIUM SERPL-SCNC: 3.9 MMOL/L — SIGNIFICANT CHANGE UP (ref 3.5–5)
POTASSIUM SERPL-SCNC: 4 MMOL/L — SIGNIFICANT CHANGE UP (ref 3.5–5)
POTASSIUM SERPL-SCNC: 4.1 MMOL/L — SIGNIFICANT CHANGE UP (ref 3.5–5)
POTASSIUM SERPL-SCNC: 4.2 MMOL/L — SIGNIFICANT CHANGE UP (ref 3.5–5)
POTASSIUM SERPL-SCNC: 4.3 MMOL/L — SIGNIFICANT CHANGE UP (ref 3.5–5)
POTASSIUM SERPL-SCNC: 4.4 MMOL/L — SIGNIFICANT CHANGE UP (ref 3.5–5)
POTASSIUM SERPL-SCNC: 4.5 MMOL/L — SIGNIFICANT CHANGE UP (ref 3.5–5)
POTASSIUM SERPL-SCNC: 4.6 MMOL/L — SIGNIFICANT CHANGE UP (ref 3.5–5)
POTASSIUM SERPL-SCNC: 4.7 MMOL/L — SIGNIFICANT CHANGE UP (ref 3.5–5)
POTASSIUM SERPL-SCNC: 4.8 MMOL/L — SIGNIFICANT CHANGE UP (ref 3.5–5)
POTASSIUM SERPL-SCNC: 5 MMOL/L — SIGNIFICANT CHANGE UP (ref 3.5–5)
POTASSIUM SERPL-SCNC: 5.2 MMOL/L — HIGH (ref 3.5–5)
POTASSIUM SERPL-SCNC: 5.2 MMOL/L — HIGH (ref 3.5–5)
PROT SERPL-MCNC: 5.7 G/DL — LOW (ref 6–8)
PROT SERPL-MCNC: 5.9 G/DL — LOW (ref 6–8)
PROT SERPL-MCNC: 6.2 G/DL — SIGNIFICANT CHANGE UP (ref 6–8)
PROT SERPL-MCNC: 6.4 G/DL — SIGNIFICANT CHANGE UP (ref 6–8)
PROT SERPL-MCNC: 6.5 G/DL — SIGNIFICANT CHANGE UP (ref 6–8)
PROT SERPL-MCNC: 6.5 G/DL — SIGNIFICANT CHANGE UP (ref 6–8)
PROT SERPL-MCNC: 6.7 G/DL — SIGNIFICANT CHANGE UP (ref 6–8)
PROT SERPL-MCNC: 6.7 G/DL — SIGNIFICANT CHANGE UP (ref 6–8)
PROT SERPL-MCNC: 7 G/DL — SIGNIFICANT CHANGE UP (ref 6–8)
PROT SERPL-MCNC: 7.9 G/DL — SIGNIFICANT CHANGE UP (ref 6–8)
PROT SERPL-MCNC: 8 G/DL — SIGNIFICANT CHANGE UP (ref 6–8)
PROT SERPL-MCNC: 8.1 G/DL — HIGH (ref 6–8)
PROT SERPL-MCNC: 8.1 G/DL — HIGH (ref 6–8)
PROT UR-MCNC: ABNORMAL
PROTHROM AB SERPL-ACNC: 15.3 SEC — HIGH (ref 9.95–12.87)
PROTHROM AB SERPL-ACNC: 15.5 SEC — HIGH (ref 9.95–12.87)
PROTHROM AB SERPL-ACNC: 16.1 SEC — HIGH (ref 9.95–12.87)
PROTHROM AB SERPL-ACNC: 18.6 SEC — HIGH (ref 9.95–12.87)
PROTHROM AB SERPL-ACNC: 19.3 SEC — HIGH (ref 9.95–12.87)
RAPID RVP RESULT: SIGNIFICANT CHANGE UP
RBC # BLD: 2.43 M/UL — LOW (ref 4.7–6.1)
RBC # BLD: 2.62 M/UL — LOW (ref 4.7–6.1)
RBC # BLD: 2.72 M/UL — LOW (ref 4.7–6.1)
RBC # BLD: 2.83 M/UL — LOW (ref 4.7–6.1)
RBC # BLD: 2.84 M/UL — LOW (ref 4.7–6.1)
RBC # BLD: 2.87 M/UL — LOW (ref 4.7–6.1)
RBC # BLD: 3.03 M/UL — LOW (ref 4.7–6.1)
RBC # BLD: 3.07 M/UL — LOW (ref 4.7–6.1)
RBC # BLD: 3.07 M/UL — LOW (ref 4.7–6.1)
RBC # BLD: 3.09 M/UL — LOW (ref 4.7–6.1)
RBC # BLD: 3.11 M/UL — LOW (ref 4.7–6.1)
RBC # BLD: 3.13 M/UL — LOW (ref 4.7–6.1)
RBC # BLD: 3.15 M/UL — LOW (ref 4.7–6.1)
RBC # BLD: 3.29 M/UL — LOW (ref 4.7–6.1)
RBC # BLD: 3.3 M/UL — LOW (ref 4.7–6.1)
RBC # BLD: 3.32 M/UL — LOW (ref 4.7–6.1)
RBC # BLD: 3.35 M/UL — LOW (ref 4.7–6.1)
RBC # BLD: 3.38 M/UL — LOW (ref 4.7–6.1)
RBC # BLD: 3.4 M/UL — LOW (ref 4.7–6.1)
RBC # BLD: 3.44 M/UL — LOW (ref 4.7–6.1)
RBC # BLD: 3.55 M/UL — LOW (ref 4.7–6.1)
RBC # BLD: 3.62 M/UL — LOW (ref 4.7–6.1)
RBC # BLD: 3.66 M/UL — LOW (ref 4.7–6.1)
RBC # BLD: 3.7 M/UL — LOW (ref 4.7–6.1)
RBC # BLD: 3.73 M/UL — LOW (ref 4.7–6.1)
RBC # BLD: 3.79 M/UL — LOW (ref 4.7–6.1)
RBC # BLD: 3.79 M/UL — LOW (ref 4.7–6.1)
RBC # BLD: 3.82 M/UL — LOW (ref 4.7–6.1)
RBC # BLD: 3.82 M/UL — LOW (ref 4.7–6.1)
RBC # BLD: 3.83 M/UL — LOW (ref 4.7–6.1)
RBC # BLD: 3.86 M/UL — LOW (ref 4.7–6.1)
RBC # BLD: 3.9 M/UL — LOW (ref 4.7–6.1)
RBC # BLD: 4.07 M/UL — LOW (ref 4.7–6.1)
RBC # BLD: 4.29 M/UL — LOW (ref 4.7–6.1)
RBC # BLD: 4.65 M/UL — LOW (ref 4.7–6.1)
RBC # FLD: 13.2 % — SIGNIFICANT CHANGE UP (ref 11.5–14.5)
RBC # FLD: 13.3 % — SIGNIFICANT CHANGE UP (ref 11.5–14.5)
RBC # FLD: 13.3 % — SIGNIFICANT CHANGE UP (ref 11.5–14.5)
RBC # FLD: 13.4 % — SIGNIFICANT CHANGE UP (ref 11.5–14.5)
RBC # FLD: 13.5 % — SIGNIFICANT CHANGE UP (ref 11.5–14.5)
RBC # FLD: 13.6 % — SIGNIFICANT CHANGE UP (ref 11.5–14.5)
RBC # FLD: 13.9 % — SIGNIFICANT CHANGE UP (ref 11.5–14.5)
RBC # FLD: 14.2 % — SIGNIFICANT CHANGE UP (ref 11.5–14.5)
RBC # FLD: 14.2 % — SIGNIFICANT CHANGE UP (ref 11.5–14.5)
RBC # FLD: 14.3 % — SIGNIFICANT CHANGE UP (ref 11.5–14.5)
RBC # FLD: 14.4 % — SIGNIFICANT CHANGE UP (ref 11.5–14.5)
RBC # FLD: 14.5 % — SIGNIFICANT CHANGE UP (ref 11.5–14.5)
RBC # FLD: 14.6 % — HIGH (ref 11.5–14.5)
RBC # FLD: 14.7 % — HIGH (ref 11.5–14.5)
RBC # FLD: 14.8 % — HIGH (ref 11.5–14.5)
RBC # FLD: 14.9 % — HIGH (ref 11.5–14.5)
RBC # FLD: 15 % — HIGH (ref 11.5–14.5)
RBC # FLD: 15 % — HIGH (ref 11.5–14.5)
RBC # FLD: 15.1 % — HIGH (ref 11.5–14.5)
RBC # FLD: 15.5 % — HIGH (ref 11.5–14.5)
RBC # FLD: 15.5 % — HIGH (ref 11.5–14.5)
RBC # FLD: 15.6 % — HIGH (ref 11.5–14.5)
RBC # FLD: 15.6 % — HIGH (ref 11.5–14.5)
RBC # FLD: 15.7 % — HIGH (ref 11.5–14.5)
RBC # FLD: 15.7 % — HIGH (ref 11.5–14.5)
RBC # FLD: 15.8 % — HIGH (ref 11.5–14.5)
RBC BLD AUTO: NORMAL — SIGNIFICANT CHANGE UP
RBC BLD AUTO: NORMAL — SIGNIFICANT CHANGE UP
RBC CASTS # UR COMP ASSIST: 24 /HPF — HIGH (ref 0–4)
RBC CASTS # UR COMP ASSIST: 28 /HPF — HIGH (ref 0–4)
RBC CASTS # UR COMP ASSIST: 28 /HPF — HIGH (ref 0–4)
RBC CASTS # UR COMP ASSIST: 91 /HPF — HIGH (ref 0–4)
RSV RESULT: NEGATIVE — SIGNIFICANT CHANGE UP
RSV RNA RESP QL NAA+PROBE: NEGATIVE — SIGNIFICANT CHANGE UP
S PNEUM AG UR QL: NEGATIVE — SIGNIFICANT CHANGE UP
SAO2 % BLDA: 98 % — SIGNIFICANT CHANGE UP (ref 94–98)
SAO2 % BLDA: 99 % — HIGH (ref 94–98)
SAO2 % BLDV: 19 % — SIGNIFICANT CHANGE UP
SAO2 % BLDV: 25 % — SIGNIFICANT CHANGE UP
SAO2 % BLDV: 46 % — SIGNIFICANT CHANGE UP
SAO2 % BLDV: 95 % — SIGNIFICANT CHANGE UP
SAO2 % BLDV: 99 % — SIGNIFICANT CHANGE UP
SODIUM SERPL-SCNC: 134 MMOL/L — LOW (ref 135–146)
SODIUM SERPL-SCNC: 136 MMOL/L — SIGNIFICANT CHANGE UP (ref 135–146)
SODIUM SERPL-SCNC: 136 MMOL/L — SIGNIFICANT CHANGE UP (ref 135–146)
SODIUM SERPL-SCNC: 137 MMOL/L — SIGNIFICANT CHANGE UP (ref 135–146)
SODIUM SERPL-SCNC: 138 MMOL/L — SIGNIFICANT CHANGE UP (ref 135–146)
SODIUM SERPL-SCNC: 139 MMOL/L — SIGNIFICANT CHANGE UP (ref 135–146)
SODIUM SERPL-SCNC: 140 MMOL/L — SIGNIFICANT CHANGE UP (ref 135–146)
SODIUM SERPL-SCNC: 140 MMOL/L — SIGNIFICANT CHANGE UP (ref 135–146)
SODIUM SERPL-SCNC: 141 MMOL/L — SIGNIFICANT CHANGE UP (ref 135–146)
SODIUM SERPL-SCNC: 142 MMOL/L — SIGNIFICANT CHANGE UP (ref 135–146)
SODIUM SERPL-SCNC: 143 MMOL/L — SIGNIFICANT CHANGE UP (ref 135–146)
SODIUM SERPL-SCNC: 143 MMOL/L — SIGNIFICANT CHANGE UP (ref 135–146)
SODIUM SERPL-SCNC: 145 MMOL/L — SIGNIFICANT CHANGE UP (ref 135–146)
SODIUM SERPL-SCNC: 146 MMOL/L — SIGNIFICANT CHANGE UP (ref 135–146)
SODIUM SERPL-SCNC: 146 MMOL/L — SIGNIFICANT CHANGE UP (ref 135–146)
SODIUM SERPL-SCNC: 148 MMOL/L — HIGH (ref 135–146)
SODIUM SERPL-SCNC: 149 MMOL/L — HIGH (ref 135–146)
SODIUM SERPL-SCNC: 149 MMOL/L — HIGH (ref 135–146)
SP GR SPEC: 1.02 — SIGNIFICANT CHANGE UP (ref 1.01–1.02)
SP GR SPEC: 1.02 — SIGNIFICANT CHANGE UP (ref 1.01–1.02)
SP GR SPEC: 1.03 — HIGH (ref 1.01–1.02)
SP GR SPEC: 1.03 — HIGH (ref 1.01–1.02)
SPECIMEN SOURCE: SIGNIFICANT CHANGE UP
T PALLIDUM AB TITR SER: NEGATIVE — SIGNIFICANT CHANGE UP
TIBC SERPL-MCNC: 141 UG/DL — LOW (ref 220–430)
TOTAL CHOLESTEROL/HDL RATIO MEASUREMENT: 2.8 RATIO — LOW (ref 4–5.5)
TRIGL SERPL-MCNC: 54 MG/DL — SIGNIFICANT CHANGE UP (ref 10–149)
TROPONIN T SERPL-MCNC: 0.01 NG/ML — SIGNIFICANT CHANGE UP
TROPONIN T SERPL-MCNC: 0.02 NG/ML — HIGH
TROPONIN T SERPL-MCNC: <0.01 NG/ML — SIGNIFICANT CHANGE UP
TSH SERPL-MCNC: 2.77 UIU/ML — SIGNIFICANT CHANGE UP (ref 0.27–4.2)
TSH SERPL-MCNC: 3 UIU/ML — SIGNIFICANT CHANGE UP (ref 0.27–4.2)
TSH SERPL-MCNC: 3.46 UIU/ML — SIGNIFICANT CHANGE UP (ref 0.27–4.2)
UIBC SERPL-MCNC: 116 UG/DL — SIGNIFICANT CHANGE UP (ref 110–370)
UROBILINOGEN FLD QL: ABNORMAL
UROBILINOGEN FLD QL: SIGNIFICANT CHANGE UP
VANCOMYCIN TROUGH SERPL-MCNC: 22.7 UG/ML — HIGH (ref 5–10)
VANCOMYCIN TROUGH SERPL-MCNC: 30.7 UG/ML — HIGH (ref 5–10)
VANCOMYCIN TROUGH SERPL-MCNC: 9 UG/ML — SIGNIFICANT CHANGE UP (ref 5–10)
VIT B12 SERPL-MCNC: 222 PG/ML — LOW (ref 232–1245)
VIT B12 SERPL-MCNC: 233 PG/ML — SIGNIFICANT CHANGE UP (ref 232–1245)
VIT B12 SERPL-MCNC: 244 PG/ML — SIGNIFICANT CHANGE UP (ref 232–1245)
WBC # BLD: 10.26 K/UL — SIGNIFICANT CHANGE UP (ref 4.8–10.8)
WBC # BLD: 10.86 K/UL — HIGH (ref 4.8–10.8)
WBC # BLD: 10.89 K/UL — HIGH (ref 4.8–10.8)
WBC # BLD: 11.03 K/UL — HIGH (ref 4.8–10.8)
WBC # BLD: 11.4 K/UL — HIGH (ref 4.8–10.8)
WBC # BLD: 12.22 K/UL — HIGH (ref 4.8–10.8)
WBC # BLD: 12.33 K/UL — HIGH (ref 4.8–10.8)
WBC # BLD: 12.5 K/UL — HIGH (ref 4.8–10.8)
WBC # BLD: 13.91 K/UL — HIGH (ref 4.8–10.8)
WBC # BLD: 14.57 K/UL — HIGH (ref 4.8–10.8)
WBC # BLD: 14.75 K/UL — HIGH (ref 4.8–10.8)
WBC # BLD: 14.96 K/UL — HIGH (ref 4.8–10.8)
WBC # BLD: 15.89 K/UL — HIGH (ref 4.8–10.8)
WBC # BLD: 15.91 K/UL — HIGH (ref 4.8–10.8)
WBC # BLD: 16.7 K/UL — HIGH (ref 4.8–10.8)
WBC # BLD: 16.8 K/UL — HIGH (ref 4.8–10.8)
WBC # BLD: 16.91 K/UL — HIGH (ref 4.8–10.8)
WBC # BLD: 17.13 K/UL — HIGH (ref 4.8–10.8)
WBC # BLD: 17.46 K/UL — HIGH (ref 4.8–10.8)
WBC # BLD: 17.53 K/UL — HIGH (ref 4.8–10.8)
WBC # BLD: 17.94 K/UL — HIGH (ref 4.8–10.8)
WBC # BLD: 18.18 K/UL — HIGH (ref 4.8–10.8)
WBC # BLD: 18.24 K/UL — HIGH (ref 4.8–10.8)
WBC # BLD: 22.78 K/UL — HIGH (ref 4.8–10.8)
WBC # BLD: 29.53 K/UL — HIGH (ref 4.8–10.8)
WBC # BLD: 37.57 K/UL — HIGH (ref 4.8–10.8)
WBC # BLD: 6.53 K/UL — SIGNIFICANT CHANGE UP (ref 4.8–10.8)
WBC # BLD: 6.67 K/UL — SIGNIFICANT CHANGE UP (ref 4.8–10.8)
WBC # BLD: 6.7 K/UL — SIGNIFICANT CHANGE UP (ref 4.8–10.8)
WBC # BLD: 7.58 K/UL — SIGNIFICANT CHANGE UP (ref 4.8–10.8)
WBC # BLD: 8.05 K/UL — SIGNIFICANT CHANGE UP (ref 4.8–10.8)
WBC # BLD: 8.79 K/UL — SIGNIFICANT CHANGE UP (ref 4.8–10.8)
WBC # BLD: 8.88 K/UL — SIGNIFICANT CHANGE UP (ref 4.8–10.8)
WBC # BLD: 8.89 K/UL — SIGNIFICANT CHANGE UP (ref 4.8–10.8)
WBC # BLD: 9.28 K/UL — SIGNIFICANT CHANGE UP (ref 4.8–10.8)
WBC # BLD: 9.34 K/UL — SIGNIFICANT CHANGE UP (ref 4.8–10.8)
WBC # BLD: 9.84 K/UL — SIGNIFICANT CHANGE UP (ref 4.8–10.8)
WBC # FLD AUTO: 10.26 K/UL — SIGNIFICANT CHANGE UP (ref 4.8–10.8)
WBC # FLD AUTO: 10.86 K/UL — HIGH (ref 4.8–10.8)
WBC # FLD AUTO: 10.89 K/UL — HIGH (ref 4.8–10.8)
WBC # FLD AUTO: 11.03 K/UL — HIGH (ref 4.8–10.8)
WBC # FLD AUTO: 11.4 K/UL — HIGH (ref 4.8–10.8)
WBC # FLD AUTO: 12.22 K/UL — HIGH (ref 4.8–10.8)
WBC # FLD AUTO: 12.33 K/UL — HIGH (ref 4.8–10.8)
WBC # FLD AUTO: 12.5 K/UL — HIGH (ref 4.8–10.8)
WBC # FLD AUTO: 13.91 K/UL — HIGH (ref 4.8–10.8)
WBC # FLD AUTO: 14.57 K/UL — HIGH (ref 4.8–10.8)
WBC # FLD AUTO: 14.75 K/UL — HIGH (ref 4.8–10.8)
WBC # FLD AUTO: 14.96 K/UL — HIGH (ref 4.8–10.8)
WBC # FLD AUTO: 15.89 K/UL — HIGH (ref 4.8–10.8)
WBC # FLD AUTO: 15.91 K/UL — HIGH (ref 4.8–10.8)
WBC # FLD AUTO: 16.7 K/UL — HIGH (ref 4.8–10.8)
WBC # FLD AUTO: 16.8 K/UL — HIGH (ref 4.8–10.8)
WBC # FLD AUTO: 16.91 K/UL — HIGH (ref 4.8–10.8)
WBC # FLD AUTO: 17.13 K/UL — HIGH (ref 4.8–10.8)
WBC # FLD AUTO: 17.46 K/UL — HIGH (ref 4.8–10.8)
WBC # FLD AUTO: 17.53 K/UL — HIGH (ref 4.8–10.8)
WBC # FLD AUTO: 17.94 K/UL — HIGH (ref 4.8–10.8)
WBC # FLD AUTO: 18.18 K/UL — HIGH (ref 4.8–10.8)
WBC # FLD AUTO: 18.24 K/UL — HIGH (ref 4.8–10.8)
WBC # FLD AUTO: 22.78 K/UL — HIGH (ref 4.8–10.8)
WBC # FLD AUTO: 29.53 K/UL — HIGH (ref 4.8–10.8)
WBC # FLD AUTO: 37.57 K/UL — HIGH (ref 4.8–10.8)
WBC # FLD AUTO: 6.53 K/UL — SIGNIFICANT CHANGE UP (ref 4.8–10.8)
WBC # FLD AUTO: 6.67 K/UL — SIGNIFICANT CHANGE UP (ref 4.8–10.8)
WBC # FLD AUTO: 6.7 K/UL — SIGNIFICANT CHANGE UP (ref 4.8–10.8)
WBC # FLD AUTO: 7.58 K/UL — SIGNIFICANT CHANGE UP (ref 4.8–10.8)
WBC # FLD AUTO: 8.05 K/UL — SIGNIFICANT CHANGE UP (ref 4.8–10.8)
WBC # FLD AUTO: 8.79 K/UL — SIGNIFICANT CHANGE UP (ref 4.8–10.8)
WBC # FLD AUTO: 8.88 K/UL — SIGNIFICANT CHANGE UP (ref 4.8–10.8)
WBC # FLD AUTO: 8.89 K/UL — SIGNIFICANT CHANGE UP (ref 4.8–10.8)
WBC # FLD AUTO: 9.28 K/UL — SIGNIFICANT CHANGE UP (ref 4.8–10.8)
WBC # FLD AUTO: 9.34 K/UL — SIGNIFICANT CHANGE UP (ref 4.8–10.8)
WBC # FLD AUTO: 9.84 K/UL — SIGNIFICANT CHANGE UP (ref 4.8–10.8)
WBC UR QL: 10 /HPF — HIGH (ref 0–5)
WBC UR QL: 10 /HPF — HIGH (ref 0–5)
WBC UR QL: 12 /HPF — HIGH (ref 0–5)
WBC UR QL: 2 /HPF — SIGNIFICANT CHANGE UP (ref 0–5)

## 2019-01-01 PROCEDURE — 99233 SBSQ HOSP IP/OBS HIGH 50: CPT

## 2019-01-01 PROCEDURE — 99285 EMERGENCY DEPT VISIT HI MDM: CPT

## 2019-01-01 PROCEDURE — 74230 X-RAY XM SWLNG FUNCJ C+: CPT | Mod: 26

## 2019-01-01 PROCEDURE — 99232 SBSQ HOSP IP/OBS MODERATE 35: CPT

## 2019-01-01 PROCEDURE — 43239 EGD BIOPSY SINGLE/MULTIPLE: CPT

## 2019-01-01 PROCEDURE — 93010 ELECTROCARDIOGRAM REPORT: CPT

## 2019-01-01 PROCEDURE — 71045 X-RAY EXAM CHEST 1 VIEW: CPT | Mod: 26

## 2019-01-01 PROCEDURE — 93970 EXTREMITY STUDY: CPT | Mod: 26

## 2019-01-01 PROCEDURE — 71046 X-RAY EXAM CHEST 2 VIEWS: CPT | Mod: 26

## 2019-01-01 PROCEDURE — 71275 CT ANGIOGRAPHY CHEST: CPT | Mod: 26

## 2019-01-01 PROCEDURE — 99291 CRITICAL CARE FIRST HOUR: CPT | Mod: 25

## 2019-01-01 PROCEDURE — 99291 CRITICAL CARE FIRST HOUR: CPT

## 2019-01-01 PROCEDURE — 74177 CT ABD & PELVIS W/CONTRAST: CPT | Mod: 26

## 2019-01-01 PROCEDURE — 99223 1ST HOSP IP/OBS HIGH 75: CPT

## 2019-01-01 PROCEDURE — 99222 1ST HOSP IP/OBS MODERATE 55: CPT

## 2019-01-01 PROCEDURE — 51702 INSERT TEMP BLADDER CATH: CPT

## 2019-01-01 PROCEDURE — 74018 RADEX ABDOMEN 1 VIEW: CPT | Mod: 26

## 2019-01-01 PROCEDURE — 70490 CT SOFT TISSUE NECK W/O DYE: CPT | Mod: 26

## 2019-01-01 PROCEDURE — 99223 1ST HOSP IP/OBS HIGH 75: CPT | Mod: AI

## 2019-01-01 PROCEDURE — 70450 CT HEAD/BRAIN W/O DYE: CPT | Mod: 26

## 2019-01-01 PROCEDURE — 99152 MOD SED SAME PHYS/QHP 5/>YRS: CPT

## 2019-01-01 PROCEDURE — 99239 HOSP IP/OBS DSCHRG MGMT >30: CPT

## 2019-01-01 PROCEDURE — 99497 ADVNCD CARE PLAN 30 MIN: CPT | Mod: 25

## 2019-01-01 PROCEDURE — 49440 PLACE GASTROSTOMY TUBE PERC: CPT

## 2019-01-01 RX ORDER — TAMSULOSIN HYDROCHLORIDE 0.4 MG/1
1 CAPSULE ORAL
Qty: 0 | Refills: 0 | DISCHARGE
Start: 2019-01-01

## 2019-01-01 RX ORDER — CEFEPIME 1 G/1
2000 INJECTION, POWDER, FOR SOLUTION INTRAMUSCULAR; INTRAVENOUS EVERY 8 HOURS
Refills: 0 | Status: DISCONTINUED | OUTPATIENT
Start: 2019-01-01 | End: 2019-01-01

## 2019-01-01 RX ORDER — ACETAMINOPHEN 500 MG
975 TABLET ORAL ONCE
Refills: 0 | Status: COMPLETED | OUTPATIENT
Start: 2019-01-01 | End: 2019-01-01

## 2019-01-01 RX ORDER — ENOXAPARIN SODIUM 100 MG/ML
40 INJECTION SUBCUTANEOUS DAILY
Refills: 0 | Status: DISCONTINUED | OUTPATIENT
Start: 2019-01-01 | End: 2019-01-01

## 2019-01-01 RX ORDER — MUPIROCIN 20 MG/G
1 OINTMENT TOPICAL ONCE
Refills: 0 | Status: COMPLETED | OUTPATIENT
Start: 2019-01-01 | End: 2019-01-01

## 2019-01-01 RX ORDER — CHLORHEXIDINE GLUCONATE 213 G/1000ML
1 SOLUTION TOPICAL
Refills: 0 | Status: COMPLETED | OUTPATIENT
Start: 2019-01-01 | End: 2019-01-01

## 2019-01-01 RX ORDER — ACETAMINOPHEN 500 MG
650 TABLET ORAL EVERY 6 HOURS
Refills: 0 | Status: DISCONTINUED | OUTPATIENT
Start: 2019-01-01 | End: 2019-01-01

## 2019-01-01 RX ORDER — TRAMADOL HYDROCHLORIDE 50 MG/1
50 TABLET ORAL ONCE
Refills: 0 | Status: DISCONTINUED | OUTPATIENT
Start: 2019-01-01 | End: 2019-01-01

## 2019-01-01 RX ORDER — ACETAMINOPHEN 500 MG
950 TABLET ORAL ONCE
Refills: 0 | Status: COMPLETED | OUTPATIENT
Start: 2019-01-01 | End: 2019-01-01

## 2019-01-01 RX ORDER — CEFTRIAXONE 500 MG/1
1000 INJECTION, POWDER, FOR SOLUTION INTRAMUSCULAR; INTRAVENOUS EVERY 24 HOURS
Refills: 0 | Status: DISCONTINUED | OUTPATIENT
Start: 2019-01-01 | End: 2019-01-01

## 2019-01-01 RX ORDER — IPRATROPIUM/ALBUTEROL SULFATE 18-103MCG
3 AEROSOL WITH ADAPTER (GRAM) INHALATION EVERY 6 HOURS
Refills: 0 | Status: DISCONTINUED | OUTPATIENT
Start: 2019-01-01 | End: 2019-01-01

## 2019-01-01 RX ORDER — TAMSULOSIN HYDROCHLORIDE 0.4 MG/1
0.4 CAPSULE ORAL AT BEDTIME
Refills: 0 | Status: DISCONTINUED | OUTPATIENT
Start: 2019-01-01 | End: 2019-01-01

## 2019-01-01 RX ORDER — FOLIC ACID 0.8 MG
1 TABLET ORAL DAILY
Refills: 0 | Status: DISCONTINUED | OUTPATIENT
Start: 2019-01-01 | End: 2019-01-01

## 2019-01-01 RX ORDER — INFLUENZA VIRUS VACCINE 15; 15; 15; 15 UG/.5ML; UG/.5ML; UG/.5ML; UG/.5ML
0.5 SUSPENSION INTRAMUSCULAR ONCE
Refills: 0 | Status: DISCONTINUED | OUTPATIENT
Start: 2019-01-01 | End: 2019-01-01

## 2019-01-01 RX ORDER — MEROPENEM 1 G/30ML
1000 INJECTION INTRAVENOUS EVERY 8 HOURS
Refills: 0 | Status: DISCONTINUED | OUTPATIENT
Start: 2019-01-01 | End: 2019-01-01

## 2019-01-01 RX ORDER — PANTOPRAZOLE SODIUM 20 MG/1
40 TABLET, DELAYED RELEASE ORAL
Refills: 0 | Status: DISCONTINUED | OUTPATIENT
Start: 2019-01-01 | End: 2019-01-01

## 2019-01-01 RX ORDER — POTASSIUM CHLORIDE 20 MEQ
40 PACKET (EA) ORAL ONCE
Refills: 0 | Status: COMPLETED | OUTPATIENT
Start: 2019-01-01 | End: 2019-01-01

## 2019-01-01 RX ORDER — MORPHINE SULFATE 50 MG/1
3 CAPSULE, EXTENDED RELEASE ORAL
Refills: 0 | Status: DISCONTINUED | OUTPATIENT
Start: 2019-01-01 | End: 2019-01-01

## 2019-01-01 RX ORDER — VANCOMYCIN HCL 1 G
VIAL (EA) INTRAVENOUS
Refills: 0 | Status: DISCONTINUED | OUTPATIENT
Start: 2019-01-01 | End: 2019-01-01

## 2019-01-01 RX ORDER — MORPHINE SULFATE 50 MG/1
2 CAPSULE, EXTENDED RELEASE ORAL ONCE
Refills: 0 | Status: DISCONTINUED | OUTPATIENT
Start: 2019-01-01 | End: 2019-01-01

## 2019-01-01 RX ORDER — MUPIROCIN 20 MG/G
1 OINTMENT TOPICAL
Refills: 0 | Status: DISCONTINUED | OUTPATIENT
Start: 2019-01-01 | End: 2019-01-01

## 2019-01-01 RX ORDER — HEPARIN SODIUM 5000 [USP'U]/ML
5000 INJECTION INTRAVENOUS; SUBCUTANEOUS EVERY 8 HOURS
Refills: 0 | Status: DISCONTINUED | OUTPATIENT
Start: 2019-01-01 | End: 2019-01-01

## 2019-01-01 RX ORDER — HEPARIN SODIUM 5000 [USP'U]/ML
5000 INJECTION INTRAVENOUS; SUBCUTANEOUS EVERY 12 HOURS
Refills: 0 | Status: DISCONTINUED | OUTPATIENT
Start: 2019-01-01 | End: 2019-01-01

## 2019-01-01 RX ORDER — MIDODRINE HYDROCHLORIDE 2.5 MG/1
5 TABLET ORAL EVERY 8 HOURS
Refills: 0 | Status: DISCONTINUED | OUTPATIENT
Start: 2019-01-01 | End: 2019-01-01

## 2019-01-01 RX ORDER — SCOPALAMINE 1 MG/3D
1 PATCH, EXTENDED RELEASE TRANSDERMAL
Refills: 0 | Status: DISCONTINUED | OUTPATIENT
Start: 2019-01-01 | End: 2019-01-01

## 2019-01-01 RX ORDER — AZITHROMYCIN 500 MG/1
500 TABLET, FILM COATED ORAL EVERY 24 HOURS
Refills: 0 | Status: DISCONTINUED | OUTPATIENT
Start: 2019-01-01 | End: 2019-01-01

## 2019-01-01 RX ORDER — MORPHINE SULFATE 50 MG/1
2 CAPSULE, EXTENDED RELEASE ORAL EVERY 4 HOURS
Refills: 0 | Status: DISCONTINUED | OUTPATIENT
Start: 2019-01-01 | End: 2019-01-01

## 2019-01-01 RX ORDER — SODIUM CHLORIDE 9 MG/ML
1000 INJECTION, SOLUTION INTRAVENOUS
Refills: 0 | Status: DISCONTINUED | OUTPATIENT
Start: 2019-01-01 | End: 2019-01-01

## 2019-01-01 RX ORDER — SODIUM CHLORIDE 9 MG/ML
500 INJECTION INTRAMUSCULAR; INTRAVENOUS; SUBCUTANEOUS ONCE
Refills: 0 | Status: COMPLETED | OUTPATIENT
Start: 2019-01-01 | End: 2019-01-01

## 2019-01-01 RX ORDER — CHLORHEXIDINE GLUCONATE 213 G/1000ML
1 SOLUTION TOPICAL
Refills: 0 | Status: DISCONTINUED | OUTPATIENT
Start: 2019-01-01 | End: 2019-01-01

## 2019-01-01 RX ORDER — PANTOPRAZOLE SODIUM 20 MG/1
40 TABLET, DELAYED RELEASE ORAL DAILY
Refills: 0 | Status: DISCONTINUED | OUTPATIENT
Start: 2019-01-01 | End: 2019-01-01

## 2019-01-01 RX ORDER — NYSTATIN CREAM 100000 [USP'U]/G
0 CREAM TOPICAL
Qty: 0 | Refills: 0 | DISCHARGE
Start: 2019-01-01

## 2019-01-01 RX ORDER — ACETAMINOPHEN 500 MG
650 TABLET ORAL ONCE
Refills: 0 | Status: COMPLETED | OUTPATIENT
Start: 2019-01-01 | End: 2019-01-01

## 2019-01-01 RX ORDER — CEFPODOXIME PROXETIL 100 MG
1 TABLET ORAL
Qty: 4 | Refills: 0
Start: 2019-01-01 | End: 2019-01-01

## 2019-01-01 RX ORDER — SODIUM CHLORIDE 9 MG/ML
2000 INJECTION, SOLUTION INTRAVENOUS ONCE
Refills: 0 | Status: COMPLETED | OUTPATIENT
Start: 2019-01-01 | End: 2019-01-01

## 2019-01-01 RX ORDER — SODIUM CHLORIDE 9 MG/ML
500 INJECTION, SOLUTION INTRAVENOUS ONCE
Refills: 0 | Status: COMPLETED | OUTPATIENT
Start: 2019-01-01 | End: 2019-01-01

## 2019-01-01 RX ORDER — MEROPENEM 1 G/30ML
500 INJECTION INTRAVENOUS EVERY 8 HOURS
Refills: 0 | Status: DISCONTINUED | OUTPATIENT
Start: 2019-01-01 | End: 2019-01-01

## 2019-01-01 RX ORDER — ACETAMINOPHEN 500 MG
2 TABLET ORAL
Qty: 0 | Refills: 0 | DISCHARGE
Start: 2019-01-01

## 2019-01-01 RX ORDER — CEFEPIME 1 G/1
2000 INJECTION, POWDER, FOR SOLUTION INTRAMUSCULAR; INTRAVENOUS ONCE
Refills: 0 | Status: COMPLETED | OUTPATIENT
Start: 2019-01-01 | End: 2019-01-01

## 2019-01-01 RX ORDER — VANCOMYCIN HCL 1 G
1000 VIAL (EA) INTRAVENOUS ONCE
Refills: 0 | Status: COMPLETED | OUTPATIENT
Start: 2019-01-01 | End: 2019-01-01

## 2019-01-01 RX ORDER — VANCOMYCIN HCL 1 G
1000 VIAL (EA) INTRAVENOUS EVERY 12 HOURS
Refills: 0 | Status: DISCONTINUED | OUTPATIENT
Start: 2019-01-01 | End: 2019-01-01

## 2019-01-01 RX ORDER — IPRATROPIUM/ALBUTEROL SULFATE 18-103MCG
3 AEROSOL WITH ADAPTER (GRAM) INHALATION ONCE
Refills: 0 | Status: DISCONTINUED | OUTPATIENT
Start: 2019-01-01 | End: 2019-01-01

## 2019-01-01 RX ORDER — MORPHINE SULFATE 50 MG/1
2 CAPSULE, EXTENDED RELEASE ORAL
Refills: 0 | Status: DISCONTINUED | OUTPATIENT
Start: 2019-01-01 | End: 2019-01-01

## 2019-01-01 RX ORDER — ENOXAPARIN SODIUM 100 MG/ML
40 INJECTION SUBCUTANEOUS AT BEDTIME
Refills: 0 | Status: DISCONTINUED | OUTPATIENT
Start: 2019-01-01 | End: 2019-01-01

## 2019-01-01 RX ORDER — CEFPODOXIME PROXETIL 100 MG
1 TABLET ORAL
Qty: 0 | Refills: 0 | DISCHARGE
Start: 2019-01-01

## 2019-01-01 RX ORDER — SODIUM CHLORIDE 9 MG/ML
1000 INJECTION INTRAMUSCULAR; INTRAVENOUS; SUBCUTANEOUS
Refills: 0 | Status: DISCONTINUED | OUTPATIENT
Start: 2019-01-01 | End: 2019-01-01

## 2019-01-01 RX ORDER — POTASSIUM CHLORIDE 20 MEQ
20 PACKET (EA) ORAL
Refills: 0 | Status: COMPLETED | OUTPATIENT
Start: 2019-01-01 | End: 2019-01-01

## 2019-01-01 RX ORDER — SENNA PLUS 8.6 MG/1
2 TABLET ORAL AT BEDTIME
Refills: 0 | Status: DISCONTINUED | OUTPATIENT
Start: 2019-01-01 | End: 2019-01-01

## 2019-01-01 RX ORDER — AMPICILLIN SODIUM AND SULBACTAM SODIUM 250; 125 MG/ML; MG/ML
3 INJECTION, POWDER, FOR SUSPENSION INTRAMUSCULAR; INTRAVENOUS EVERY 6 HOURS
Refills: 0 | Status: DISCONTINUED | OUTPATIENT
Start: 2019-01-01 | End: 2019-01-01

## 2019-01-01 RX ORDER — IPRATROPIUM/ALBUTEROL SULFATE 18-103MCG
3 AEROSOL WITH ADAPTER (GRAM) INHALATION
Qty: 0 | Refills: 0 | DISCHARGE
Start: 2019-01-01

## 2019-01-01 RX ORDER — MAGNESIUM HYDROXIDE 400 MG/1
30 TABLET, CHEWABLE ORAL DAILY
Refills: 0 | Status: DISCONTINUED | OUTPATIENT
Start: 2019-01-01 | End: 2019-01-01

## 2019-01-01 RX ORDER — SODIUM CHLORIDE 9 MG/ML
1000 INJECTION, SOLUTION INTRAVENOUS ONCE
Refills: 0 | Status: COMPLETED | OUTPATIENT
Start: 2019-01-01 | End: 2019-01-01

## 2019-01-01 RX ORDER — SODIUM CHLORIDE 9 MG/ML
250 INJECTION INTRAMUSCULAR; INTRAVENOUS; SUBCUTANEOUS ONCE
Refills: 0 | Status: COMPLETED | OUTPATIENT
Start: 2019-01-01 | End: 2019-01-01

## 2019-01-01 RX ORDER — LANOLIN ALCOHOL/MO/W.PET/CERES
3 CREAM (GRAM) TOPICAL AT BEDTIME
Refills: 0 | Status: DISCONTINUED | OUTPATIENT
Start: 2019-01-01 | End: 2019-01-01

## 2019-01-01 RX ORDER — MAGNESIUM SULFATE 500 MG/ML
2 VIAL (ML) INJECTION
Refills: 0 | Status: COMPLETED | OUTPATIENT
Start: 2019-01-01 | End: 2019-01-01

## 2019-01-01 RX ORDER — VANCOMYCIN HCL 1 G
750 VIAL (EA) INTRAVENOUS EVERY 12 HOURS
Refills: 0 | Status: DISCONTINUED | OUTPATIENT
Start: 2019-01-01 | End: 2019-01-01

## 2019-01-01 RX ORDER — AZITHROMYCIN 500 MG/1
500 TABLET, FILM COATED ORAL ONCE
Refills: 0 | Status: COMPLETED | OUTPATIENT
Start: 2019-01-01 | End: 2019-01-01

## 2019-01-01 RX ORDER — CEFEPIME 1 G/1
1000 INJECTION, POWDER, FOR SOLUTION INTRAMUSCULAR; INTRAVENOUS DAILY
Refills: 0 | Status: DISCONTINUED | OUTPATIENT
Start: 2019-01-01 | End: 2019-01-01

## 2019-01-01 RX ORDER — CEFPODOXIME PROXETIL 100 MG
100 TABLET ORAL EVERY 12 HOURS
Refills: 0 | Status: DISCONTINUED | OUTPATIENT
Start: 2019-01-01 | End: 2019-01-01

## 2019-01-01 RX ORDER — NYSTATIN CREAM 100000 [USP'U]/G
1 CREAM TOPICAL
Refills: 0 | Status: DISCONTINUED | OUTPATIENT
Start: 2019-01-01 | End: 2019-01-01

## 2019-01-01 RX ORDER — CEFAZOLIN SODIUM 1 G
1000 VIAL (EA) INJECTION ONCE
Refills: 0 | Status: COMPLETED | OUTPATIENT
Start: 2019-01-01 | End: 2019-01-01

## 2019-01-01 RX ORDER — VANCOMYCIN HCL 1 G
500 VIAL (EA) INTRAVENOUS EVERY 12 HOURS
Refills: 0 | Status: DISCONTINUED | OUTPATIENT
Start: 2019-01-01 | End: 2019-01-01

## 2019-01-01 RX ORDER — TAMSULOSIN HYDROCHLORIDE 0.4 MG/1
1 CAPSULE ORAL
Qty: 0 | Refills: 0 | DISCHARGE

## 2019-01-01 RX ORDER — CEFEPIME 1 G/1
500 INJECTION, POWDER, FOR SOLUTION INTRAMUSCULAR; INTRAVENOUS ONCE
Refills: 0 | Status: DISCONTINUED | OUTPATIENT
Start: 2019-01-01 | End: 2019-01-01

## 2019-01-01 RX ORDER — MUPIROCIN 20 MG/G
1 OINTMENT TOPICAL
Refills: 0 | Status: COMPLETED | OUTPATIENT
Start: 2019-01-01 | End: 2019-01-01

## 2019-01-01 RX ORDER — AZITHROMYCIN 500 MG/1
TABLET, FILM COATED ORAL
Refills: 0 | Status: DISCONTINUED | OUTPATIENT
Start: 2019-01-01 | End: 2019-01-01

## 2019-01-01 RX ORDER — SODIUM CHLORIDE 9 MG/ML
2500 INJECTION, SOLUTION INTRAVENOUS ONCE
Refills: 0 | Status: COMPLETED | OUTPATIENT
Start: 2019-01-01 | End: 2019-01-01

## 2019-01-01 RX ORDER — MAGNESIUM SULFATE 500 MG/ML
1 VIAL (ML) INJECTION ONCE
Refills: 0 | Status: COMPLETED | OUTPATIENT
Start: 2019-01-01 | End: 2019-01-01

## 2019-01-01 RX ORDER — LIDOCAINE 4 G/100G
1 CREAM TOPICAL ONCE
Refills: 0 | Status: COMPLETED | OUTPATIENT
Start: 2019-01-01 | End: 2019-01-01

## 2019-01-01 RX ORDER — SODIUM CHLORIDE 9 MG/ML
1500 INJECTION, SOLUTION INTRAVENOUS ONCE
Refills: 0 | Status: COMPLETED | OUTPATIENT
Start: 2019-01-01 | End: 2019-01-01

## 2019-01-01 RX ORDER — POTASSIUM CHLORIDE 20 MEQ
40 PACKET (EA) ORAL EVERY 4 HOURS
Refills: 0 | Status: COMPLETED | OUTPATIENT
Start: 2019-01-01 | End: 2019-01-01

## 2019-01-01 RX ORDER — ACETAMINOPHEN 500 MG
975 TABLET ORAL ONCE
Refills: 0 | Status: DISCONTINUED | OUTPATIENT
Start: 2019-01-01 | End: 2019-01-01

## 2019-01-01 RX ADMIN — ENOXAPARIN SODIUM 40 MILLIGRAM(S): 100 INJECTION SUBCUTANEOUS at 12:29

## 2019-01-01 RX ADMIN — MIDODRINE HYDROCHLORIDE 5 MILLIGRAM(S): 2.5 TABLET ORAL at 06:04

## 2019-01-01 RX ADMIN — CEFEPIME 100 MILLIGRAM(S): 1 INJECTION, POWDER, FOR SOLUTION INTRAMUSCULAR; INTRAVENOUS at 05:48

## 2019-01-01 RX ADMIN — MIDODRINE HYDROCHLORIDE 5 MILLIGRAM(S): 2.5 TABLET ORAL at 21:20

## 2019-01-01 RX ADMIN — Medication 650 MILLIGRAM(S): at 20:39

## 2019-01-01 RX ADMIN — Medication 110 MILLIGRAM(S): at 17:16

## 2019-01-01 RX ADMIN — Medication 40 MILLIEQUIVALENT(S): at 22:52

## 2019-01-01 RX ADMIN — Medication 110 MILLIGRAM(S): at 05:40

## 2019-01-01 RX ADMIN — Medication 3 MILLILITER(S): at 19:35

## 2019-01-01 RX ADMIN — CEFEPIME 100 MILLIGRAM(S): 1 INJECTION, POWDER, FOR SOLUTION INTRAMUSCULAR; INTRAVENOUS at 21:20

## 2019-01-01 RX ADMIN — Medication 3 MILLILITER(S): at 13:38

## 2019-01-01 RX ADMIN — Medication 3 MILLILITER(S): at 19:39

## 2019-01-01 RX ADMIN — MIDODRINE HYDROCHLORIDE 5 MILLIGRAM(S): 2.5 TABLET ORAL at 21:38

## 2019-01-01 RX ADMIN — Medication 3 MILLILITER(S): at 08:28

## 2019-01-01 RX ADMIN — CEFEPIME 100 MILLIGRAM(S): 1 INJECTION, POWDER, FOR SOLUTION INTRAMUSCULAR; INTRAVENOUS at 21:10

## 2019-01-01 RX ADMIN — SODIUM CHLORIDE 75 MILLILITER(S): 9 INJECTION, SOLUTION INTRAVENOUS at 08:22

## 2019-01-01 RX ADMIN — TAMSULOSIN HYDROCHLORIDE 0.4 MILLIGRAM(S): 0.4 CAPSULE ORAL at 21:30

## 2019-01-01 RX ADMIN — CEFTRIAXONE 100 MILLIGRAM(S): 500 INJECTION, POWDER, FOR SOLUTION INTRAMUSCULAR; INTRAVENOUS at 05:13

## 2019-01-01 RX ADMIN — Medication 1 TABLET(S): at 06:05

## 2019-01-01 RX ADMIN — Medication 3 MILLILITER(S): at 19:49

## 2019-01-01 RX ADMIN — CHLORHEXIDINE GLUCONATE 1 APPLICATION(S): 213 SOLUTION TOPICAL at 05:14

## 2019-01-01 RX ADMIN — CEFEPIME 100 MILLIGRAM(S): 1 INJECTION, POWDER, FOR SOLUTION INTRAMUSCULAR; INTRAVENOUS at 05:35

## 2019-01-01 RX ADMIN — CEFTRIAXONE 100 MILLIGRAM(S): 500 INJECTION, POWDER, FOR SOLUTION INTRAMUSCULAR; INTRAVENOUS at 06:11

## 2019-01-01 RX ADMIN — Medication 1 TABLET(S): at 12:28

## 2019-01-01 RX ADMIN — ENOXAPARIN SODIUM 40 MILLIGRAM(S): 100 INJECTION SUBCUTANEOUS at 13:17

## 2019-01-01 RX ADMIN — Medication 40 MILLIGRAM(S): at 21:26

## 2019-01-01 RX ADMIN — PANTOPRAZOLE SODIUM 40 MILLIGRAM(S): 20 TABLET, DELAYED RELEASE ORAL at 11:45

## 2019-01-01 RX ADMIN — Medication 3 MILLILITER(S): at 14:51

## 2019-01-01 RX ADMIN — MIDODRINE HYDROCHLORIDE 5 MILLIGRAM(S): 2.5 TABLET ORAL at 23:00

## 2019-01-01 RX ADMIN — SODIUM CHLORIDE 75 MILLILITER(S): 9 INJECTION, SOLUTION INTRAVENOUS at 17:47

## 2019-01-01 RX ADMIN — ENOXAPARIN SODIUM 40 MILLIGRAM(S): 100 INJECTION SUBCUTANEOUS at 12:41

## 2019-01-01 RX ADMIN — MIDODRINE HYDROCHLORIDE 5 MILLIGRAM(S): 2.5 TABLET ORAL at 11:46

## 2019-01-01 RX ADMIN — Medication 3 MILLILITER(S): at 20:48

## 2019-01-01 RX ADMIN — MIDODRINE HYDROCHLORIDE 5 MILLIGRAM(S): 2.5 TABLET ORAL at 21:25

## 2019-01-01 RX ADMIN — MIDODRINE HYDROCHLORIDE 5 MILLIGRAM(S): 2.5 TABLET ORAL at 14:15

## 2019-01-01 RX ADMIN — ENOXAPARIN SODIUM 40 MILLIGRAM(S): 100 INJECTION SUBCUTANEOUS at 13:06

## 2019-01-01 RX ADMIN — NYSTATIN CREAM 1 APPLICATION(S): 100000 CREAM TOPICAL at 17:58

## 2019-01-01 RX ADMIN — CEFEPIME 100 MILLIGRAM(S): 1 INJECTION, POWDER, FOR SOLUTION INTRAMUSCULAR; INTRAVENOUS at 21:15

## 2019-01-01 RX ADMIN — HEPARIN SODIUM 5000 UNIT(S): 5000 INJECTION INTRAVENOUS; SUBCUTANEOUS at 14:34

## 2019-01-01 RX ADMIN — Medication 3 MILLILITER(S): at 13:18

## 2019-01-01 RX ADMIN — CHLORHEXIDINE GLUCONATE 1 APPLICATION(S): 213 SOLUTION TOPICAL at 05:00

## 2019-01-01 RX ADMIN — SODIUM CHLORIDE 75 MILLILITER(S): 9 INJECTION, SOLUTION INTRAVENOUS at 02:31

## 2019-01-01 RX ADMIN — Medication 110 MILLIGRAM(S): at 18:35

## 2019-01-01 RX ADMIN — Medication 1 TABLET(S): at 18:19

## 2019-01-01 RX ADMIN — CEFEPIME 100 MILLIGRAM(S): 1 INJECTION, POWDER, FOR SOLUTION INTRAMUSCULAR; INTRAVENOUS at 05:33

## 2019-01-01 RX ADMIN — MIDODRINE HYDROCHLORIDE 5 MILLIGRAM(S): 2.5 TABLET ORAL at 13:06

## 2019-01-01 RX ADMIN — SODIUM CHLORIDE 2000 MILLILITER(S): 9 INJECTION, SOLUTION INTRAVENOUS at 22:58

## 2019-01-01 RX ADMIN — CEFEPIME 100 MILLIGRAM(S): 1 INJECTION, POWDER, FOR SOLUTION INTRAMUSCULAR; INTRAVENOUS at 22:25

## 2019-01-01 RX ADMIN — Medication 650 MILLIGRAM(S): at 15:06

## 2019-01-01 RX ADMIN — MORPHINE SULFATE 2 MILLIGRAM(S): 50 CAPSULE, EXTENDED RELEASE ORAL at 23:03

## 2019-01-01 RX ADMIN — Medication 1 TABLET(S): at 14:31

## 2019-01-01 RX ADMIN — Medication 3 MILLILITER(S): at 08:34

## 2019-01-01 RX ADMIN — CHLORHEXIDINE GLUCONATE 1 APPLICATION(S): 213 SOLUTION TOPICAL at 06:30

## 2019-01-01 RX ADMIN — ENOXAPARIN SODIUM 40 MILLIGRAM(S): 100 INJECTION SUBCUTANEOUS at 12:00

## 2019-01-01 RX ADMIN — Medication 3 MILLILITER(S): at 13:57

## 2019-01-01 RX ADMIN — MUPIROCIN 1 APPLICATION(S): 20 OINTMENT TOPICAL at 05:25

## 2019-01-01 RX ADMIN — Medication 3 MILLILITER(S): at 19:44

## 2019-01-01 RX ADMIN — Medication 1 TABLET(S): at 11:45

## 2019-01-01 RX ADMIN — SCOPALAMINE 1 PATCH: 1 PATCH, EXTENDED RELEASE TRANSDERMAL at 12:03

## 2019-01-01 RX ADMIN — MUPIROCIN 1 APPLICATION(S): 20 OINTMENT TOPICAL at 17:16

## 2019-01-01 RX ADMIN — Medication 3 MILLILITER(S): at 14:12

## 2019-01-01 RX ADMIN — Medication 1 MILLIGRAM(S): at 11:24

## 2019-01-01 RX ADMIN — Medication 3 MILLILITER(S): at 14:06

## 2019-01-01 RX ADMIN — Medication 50 GRAM(S): at 14:03

## 2019-01-01 RX ADMIN — Medication 1 TABLET(S): at 06:01

## 2019-01-01 RX ADMIN — ENOXAPARIN SODIUM 40 MILLIGRAM(S): 100 INJECTION SUBCUTANEOUS at 11:59

## 2019-01-01 RX ADMIN — CEFEPIME 100 MILLIGRAM(S): 1 INJECTION, POWDER, FOR SOLUTION INTRAMUSCULAR; INTRAVENOUS at 14:49

## 2019-01-01 RX ADMIN — MEROPENEM 100 MILLIGRAM(S): 1 INJECTION INTRAVENOUS at 23:05

## 2019-01-01 RX ADMIN — SODIUM CHLORIDE 1000 MILLILITER(S): 9 INJECTION INTRAMUSCULAR; INTRAVENOUS; SUBCUTANEOUS at 23:25

## 2019-01-01 RX ADMIN — ENOXAPARIN SODIUM 40 MILLIGRAM(S): 100 INJECTION SUBCUTANEOUS at 12:43

## 2019-01-01 RX ADMIN — SODIUM CHLORIDE 500 MILLILITER(S): 9 INJECTION INTRAMUSCULAR; INTRAVENOUS; SUBCUTANEOUS at 21:29

## 2019-01-01 RX ADMIN — Medication 950 MILLIGRAM(S): at 23:00

## 2019-01-01 RX ADMIN — MIDODRINE HYDROCHLORIDE 5 MILLIGRAM(S): 2.5 TABLET ORAL at 14:17

## 2019-01-01 RX ADMIN — MIDODRINE HYDROCHLORIDE 5 MILLIGRAM(S): 2.5 TABLET ORAL at 05:36

## 2019-01-01 RX ADMIN — TAMSULOSIN HYDROCHLORIDE 0.4 MILLIGRAM(S): 0.4 CAPSULE ORAL at 21:49

## 2019-01-01 RX ADMIN — Medication 3 MILLILITER(S): at 07:22

## 2019-01-01 RX ADMIN — Medication 1 TABLET(S): at 12:54

## 2019-01-01 RX ADMIN — Medication 40 MILLIEQUIVALENT(S): at 11:17

## 2019-01-01 RX ADMIN — AMPICILLIN SODIUM AND SULBACTAM SODIUM 200 GRAM(S): 250; 125 INJECTION, POWDER, FOR SUSPENSION INTRAMUSCULAR; INTRAVENOUS at 00:16

## 2019-01-01 RX ADMIN — MUPIROCIN 1 APPLICATION(S): 20 OINTMENT TOPICAL at 11:41

## 2019-01-01 RX ADMIN — Medication 40 MILLIGRAM(S): at 06:57

## 2019-01-01 RX ADMIN — ENOXAPARIN SODIUM 40 MILLIGRAM(S): 100 INJECTION SUBCUTANEOUS at 11:17

## 2019-01-01 RX ADMIN — TRAMADOL HYDROCHLORIDE 50 MILLIGRAM(S): 50 TABLET ORAL at 11:49

## 2019-01-01 RX ADMIN — MEROPENEM 100 MILLIGRAM(S): 1 INJECTION INTRAVENOUS at 20:11

## 2019-01-01 RX ADMIN — MIDODRINE HYDROCHLORIDE 5 MILLIGRAM(S): 2.5 TABLET ORAL at 05:16

## 2019-01-01 RX ADMIN — PANTOPRAZOLE SODIUM 40 MILLIGRAM(S): 20 TABLET, DELAYED RELEASE ORAL at 12:54

## 2019-01-01 RX ADMIN — Medication 200 MILLIGRAM(S): at 14:31

## 2019-01-01 RX ADMIN — Medication 3 MILLILITER(S): at 09:04

## 2019-01-01 RX ADMIN — MIDODRINE HYDROCHLORIDE 5 MILLIGRAM(S): 2.5 TABLET ORAL at 13:03

## 2019-01-01 RX ADMIN — AZITHROMYCIN 255 MILLIGRAM(S): 500 TABLET, FILM COATED ORAL at 00:24

## 2019-01-01 RX ADMIN — Medication 3 MILLILITER(S): at 08:48

## 2019-01-01 RX ADMIN — SCOPALAMINE 1 PATCH: 1 PATCH, EXTENDED RELEASE TRANSDERMAL at 09:15

## 2019-01-01 RX ADMIN — Medication 3 MILLILITER(S): at 09:01

## 2019-01-01 RX ADMIN — Medication 3 MILLILITER(S): at 20:40

## 2019-01-01 RX ADMIN — TAMSULOSIN HYDROCHLORIDE 0.4 MILLIGRAM(S): 0.4 CAPSULE ORAL at 21:33

## 2019-01-01 RX ADMIN — ENOXAPARIN SODIUM 40 MILLIGRAM(S): 100 INJECTION SUBCUTANEOUS at 11:01

## 2019-01-01 RX ADMIN — Medication 1 TABLET(S): at 06:44

## 2019-01-01 RX ADMIN — CHLORHEXIDINE GLUCONATE 1 APPLICATION(S): 213 SOLUTION TOPICAL at 05:16

## 2019-01-01 RX ADMIN — AMPICILLIN SODIUM AND SULBACTAM SODIUM 200 GRAM(S): 250; 125 INJECTION, POWDER, FOR SUSPENSION INTRAMUSCULAR; INTRAVENOUS at 00:24

## 2019-01-01 RX ADMIN — Medication 1 TABLET(S): at 13:04

## 2019-01-01 RX ADMIN — Medication 1 MILLIGRAM(S): at 11:37

## 2019-01-01 RX ADMIN — MEROPENEM 100 MILLIGRAM(S): 1 INJECTION INTRAVENOUS at 05:16

## 2019-01-01 RX ADMIN — TAMSULOSIN HYDROCHLORIDE 0.4 MILLIGRAM(S): 0.4 CAPSULE ORAL at 22:16

## 2019-01-01 RX ADMIN — Medication 3 MILLILITER(S): at 07:55

## 2019-01-01 RX ADMIN — CEFEPIME 100 MILLIGRAM(S): 1 INJECTION, POWDER, FOR SOLUTION INTRAMUSCULAR; INTRAVENOUS at 05:27

## 2019-01-01 RX ADMIN — PANTOPRAZOLE SODIUM 40 MILLIGRAM(S): 20 TABLET, DELAYED RELEASE ORAL at 13:41

## 2019-01-01 RX ADMIN — ENOXAPARIN SODIUM 40 MILLIGRAM(S): 100 INJECTION SUBCUTANEOUS at 11:13

## 2019-01-01 RX ADMIN — MUPIROCIN 1 APPLICATION(S): 20 OINTMENT TOPICAL at 05:41

## 2019-01-01 RX ADMIN — SODIUM CHLORIDE 75 MILLILITER(S): 9 INJECTION INTRAMUSCULAR; INTRAVENOUS; SUBCUTANEOUS at 16:27

## 2019-01-01 RX ADMIN — Medication 3 MILLILITER(S): at 20:53

## 2019-01-01 RX ADMIN — CHLORHEXIDINE GLUCONATE 1 APPLICATION(S): 213 SOLUTION TOPICAL at 05:22

## 2019-01-01 RX ADMIN — SODIUM CHLORIDE 1000 MILLILITER(S): 9 INJECTION INTRAMUSCULAR; INTRAVENOUS; SUBCUTANEOUS at 00:45

## 2019-01-01 RX ADMIN — CEFEPIME 100 MILLIGRAM(S): 1 INJECTION, POWDER, FOR SOLUTION INTRAMUSCULAR; INTRAVENOUS at 13:03

## 2019-01-01 RX ADMIN — TAMSULOSIN HYDROCHLORIDE 0.4 MILLIGRAM(S): 0.4 CAPSULE ORAL at 21:42

## 2019-01-01 RX ADMIN — HEPARIN SODIUM 5000 UNIT(S): 5000 INJECTION INTRAVENOUS; SUBCUTANEOUS at 17:40

## 2019-01-01 RX ADMIN — Medication 3 MILLILITER(S): at 13:37

## 2019-01-01 RX ADMIN — Medication 3 MILLILITER(S): at 13:35

## 2019-01-01 RX ADMIN — Medication 3 MILLILITER(S): at 20:59

## 2019-01-01 RX ADMIN — MIDODRINE HYDROCHLORIDE 5 MILLIGRAM(S): 2.5 TABLET ORAL at 05:48

## 2019-01-01 RX ADMIN — ENOXAPARIN SODIUM 40 MILLIGRAM(S): 100 INJECTION SUBCUTANEOUS at 21:07

## 2019-01-01 RX ADMIN — ENOXAPARIN SODIUM 40 MILLIGRAM(S): 100 INJECTION SUBCUTANEOUS at 21:23

## 2019-01-01 RX ADMIN — CEFEPIME 100 MILLIGRAM(S): 1 INJECTION, POWDER, FOR SOLUTION INTRAMUSCULAR; INTRAVENOUS at 13:19

## 2019-01-01 RX ADMIN — CEFEPIME 100 MILLIGRAM(S): 1 INJECTION, POWDER, FOR SOLUTION INTRAMUSCULAR; INTRAVENOUS at 05:18

## 2019-01-01 RX ADMIN — Medication 1 TABLET(S): at 13:40

## 2019-01-01 RX ADMIN — Medication 650 MILLIGRAM(S): at 21:37

## 2019-01-01 RX ADMIN — Medication 650 MILLIGRAM(S): at 11:38

## 2019-01-01 RX ADMIN — Medication 250 MILLIGRAM(S): at 06:05

## 2019-01-01 RX ADMIN — MORPHINE SULFATE 2 MILLIGRAM(S): 50 CAPSULE, EXTENDED RELEASE ORAL at 04:10

## 2019-01-01 RX ADMIN — HEPARIN SODIUM 5000 UNIT(S): 5000 INJECTION INTRAVENOUS; SUBCUTANEOUS at 21:18

## 2019-01-01 RX ADMIN — HEPARIN SODIUM 5000 UNIT(S): 5000 INJECTION INTRAVENOUS; SUBCUTANEOUS at 18:19

## 2019-01-01 RX ADMIN — CHLORHEXIDINE GLUCONATE 1 APPLICATION(S): 213 SOLUTION TOPICAL at 06:04

## 2019-01-01 RX ADMIN — Medication 3 MILLILITER(S): at 08:09

## 2019-01-01 RX ADMIN — Medication 0.5 MILLIGRAM(S): at 20:46

## 2019-01-01 RX ADMIN — SODIUM CHLORIDE 2500 MILLILITER(S): 9 INJECTION, SOLUTION INTRAVENOUS at 23:05

## 2019-01-01 RX ADMIN — Medication 3 MILLILITER(S): at 13:34

## 2019-01-01 RX ADMIN — CEFTRIAXONE 100 MILLIGRAM(S): 500 INJECTION, POWDER, FOR SOLUTION INTRAMUSCULAR; INTRAVENOUS at 15:21

## 2019-01-01 RX ADMIN — Medication 3 MILLILITER(S): at 13:10

## 2019-01-01 RX ADMIN — CEFEPIME 100 MILLIGRAM(S): 1 INJECTION, POWDER, FOR SOLUTION INTRAMUSCULAR; INTRAVENOUS at 21:01

## 2019-01-01 RX ADMIN — ENOXAPARIN SODIUM 40 MILLIGRAM(S): 100 INJECTION SUBCUTANEOUS at 13:03

## 2019-01-01 RX ADMIN — CHLORHEXIDINE GLUCONATE 1 APPLICATION(S): 213 SOLUTION TOPICAL at 22:10

## 2019-01-01 RX ADMIN — Medication 40 MILLIGRAM(S): at 05:20

## 2019-01-01 RX ADMIN — Medication 1 TABLET(S): at 13:06

## 2019-01-01 RX ADMIN — CHLORHEXIDINE GLUCONATE 1 APPLICATION(S): 213 SOLUTION TOPICAL at 05:49

## 2019-01-01 RX ADMIN — PANTOPRAZOLE SODIUM 40 MILLIGRAM(S): 20 TABLET, DELAYED RELEASE ORAL at 11:13

## 2019-01-01 RX ADMIN — MIDODRINE HYDROCHLORIDE 5 MILLIGRAM(S): 2.5 TABLET ORAL at 21:10

## 2019-01-01 RX ADMIN — ENOXAPARIN SODIUM 40 MILLIGRAM(S): 100 INJECTION SUBCUTANEOUS at 11:41

## 2019-01-01 RX ADMIN — Medication 3 MILLILITER(S): at 08:26

## 2019-01-01 RX ADMIN — MORPHINE SULFATE 2 MILLIGRAM(S): 50 CAPSULE, EXTENDED RELEASE ORAL at 18:18

## 2019-01-01 RX ADMIN — CEFTRIAXONE 100 MILLIGRAM(S): 500 INJECTION, POWDER, FOR SOLUTION INTRAMUSCULAR; INTRAVENOUS at 06:29

## 2019-01-01 RX ADMIN — HEPARIN SODIUM 5000 UNIT(S): 5000 INJECTION INTRAVENOUS; SUBCUTANEOUS at 05:53

## 2019-01-01 RX ADMIN — TAMSULOSIN HYDROCHLORIDE 0.4 MILLIGRAM(S): 0.4 CAPSULE ORAL at 21:18

## 2019-01-01 RX ADMIN — MIDODRINE HYDROCHLORIDE 5 MILLIGRAM(S): 2.5 TABLET ORAL at 21:37

## 2019-01-01 RX ADMIN — AMPICILLIN SODIUM AND SULBACTAM SODIUM 200 GRAM(S): 250; 125 INJECTION, POWDER, FOR SUSPENSION INTRAMUSCULAR; INTRAVENOUS at 17:30

## 2019-01-01 RX ADMIN — MIDODRINE HYDROCHLORIDE 5 MILLIGRAM(S): 2.5 TABLET ORAL at 14:08

## 2019-01-01 RX ADMIN — TAMSULOSIN HYDROCHLORIDE 0.4 MILLIGRAM(S): 0.4 CAPSULE ORAL at 21:52

## 2019-01-01 RX ADMIN — SODIUM CHLORIDE 1000 MILLILITER(S): 9 INJECTION INTRAMUSCULAR; INTRAVENOUS; SUBCUTANEOUS at 20:20

## 2019-01-01 RX ADMIN — AMPICILLIN SODIUM AND SULBACTAM SODIUM 200 GRAM(S): 250; 125 INJECTION, POWDER, FOR SUSPENSION INTRAMUSCULAR; INTRAVENOUS at 12:40

## 2019-01-01 RX ADMIN — Medication 1 TABLET(S): at 11:57

## 2019-01-01 RX ADMIN — TAMSULOSIN HYDROCHLORIDE 0.4 MILLIGRAM(S): 0.4 CAPSULE ORAL at 21:26

## 2019-01-01 RX ADMIN — PANTOPRAZOLE SODIUM 40 MILLIGRAM(S): 20 TABLET, DELAYED RELEASE ORAL at 06:30

## 2019-01-01 RX ADMIN — ENOXAPARIN SODIUM 40 MILLIGRAM(S): 100 INJECTION SUBCUTANEOUS at 11:18

## 2019-01-01 RX ADMIN — CEFEPIME 100 MILLIGRAM(S): 1 INJECTION, POWDER, FOR SOLUTION INTRAMUSCULAR; INTRAVENOUS at 14:12

## 2019-01-01 RX ADMIN — Medication 20 MILLIEQUIVALENT(S): at 14:46

## 2019-01-01 RX ADMIN — MUPIROCIN 1 APPLICATION(S): 20 OINTMENT TOPICAL at 05:48

## 2019-01-01 RX ADMIN — Medication 3 MILLILITER(S): at 14:01

## 2019-01-01 RX ADMIN — ENOXAPARIN SODIUM 40 MILLIGRAM(S): 100 INJECTION SUBCUTANEOUS at 21:52

## 2019-01-01 RX ADMIN — TAMSULOSIN HYDROCHLORIDE 0.4 MILLIGRAM(S): 0.4 CAPSULE ORAL at 21:34

## 2019-01-01 RX ADMIN — MEROPENEM 100 MILLIGRAM(S): 1 INJECTION INTRAVENOUS at 14:46

## 2019-01-01 RX ADMIN — ENOXAPARIN SODIUM 40 MILLIGRAM(S): 100 INJECTION SUBCUTANEOUS at 22:15

## 2019-01-01 RX ADMIN — Medication 1 TABLET(S): at 11:14

## 2019-01-01 RX ADMIN — TAMSULOSIN HYDROCHLORIDE 0.4 MILLIGRAM(S): 0.4 CAPSULE ORAL at 21:28

## 2019-01-01 RX ADMIN — Medication 1 MILLIGRAM(S): at 12:59

## 2019-01-01 RX ADMIN — Medication 650 MILLIGRAM(S): at 22:04

## 2019-01-01 RX ADMIN — MORPHINE SULFATE 2 MILLIGRAM(S): 50 CAPSULE, EXTENDED RELEASE ORAL at 03:28

## 2019-01-01 RX ADMIN — SODIUM CHLORIDE 75 MILLILITER(S): 9 INJECTION, SOLUTION INTRAVENOUS at 21:18

## 2019-01-01 RX ADMIN — Medication 975 MILLIGRAM(S): at 21:21

## 2019-01-01 RX ADMIN — Medication 3 MILLILITER(S): at 20:14

## 2019-01-01 RX ADMIN — TAMSULOSIN HYDROCHLORIDE 0.4 MILLIGRAM(S): 0.4 CAPSULE ORAL at 22:15

## 2019-01-01 RX ADMIN — Medication 3 MILLILITER(S): at 20:09

## 2019-01-01 RX ADMIN — MIDODRINE HYDROCHLORIDE 5 MILLIGRAM(S): 2.5 TABLET ORAL at 14:13

## 2019-01-01 RX ADMIN — CEFEPIME 100 MILLIGRAM(S): 1 INJECTION, POWDER, FOR SOLUTION INTRAMUSCULAR; INTRAVENOUS at 05:51

## 2019-01-01 RX ADMIN — MIDODRINE HYDROCHLORIDE 5 MILLIGRAM(S): 2.5 TABLET ORAL at 21:26

## 2019-01-01 RX ADMIN — MEROPENEM 100 MILLIGRAM(S): 1 INJECTION INTRAVENOUS at 23:00

## 2019-01-01 RX ADMIN — MUPIROCIN 1 APPLICATION(S): 20 OINTMENT TOPICAL at 05:01

## 2019-01-01 RX ADMIN — MIDODRINE HYDROCHLORIDE 5 MILLIGRAM(S): 2.5 TABLET ORAL at 21:01

## 2019-01-01 RX ADMIN — MORPHINE SULFATE 3 MILLIGRAM(S): 50 CAPSULE, EXTENDED RELEASE ORAL at 17:53

## 2019-01-01 RX ADMIN — SODIUM CHLORIDE 100 MILLILITER(S): 9 INJECTION INTRAMUSCULAR; INTRAVENOUS; SUBCUTANEOUS at 08:10

## 2019-01-01 RX ADMIN — ENOXAPARIN SODIUM 40 MILLIGRAM(S): 100 INJECTION SUBCUTANEOUS at 12:31

## 2019-01-01 RX ADMIN — Medication 3 MILLILITER(S): at 21:14

## 2019-01-01 RX ADMIN — MEROPENEM 100 MILLIGRAM(S): 1 INJECTION INTRAVENOUS at 14:30

## 2019-01-01 RX ADMIN — ENOXAPARIN SODIUM 40 MILLIGRAM(S): 100 INJECTION SUBCUTANEOUS at 21:34

## 2019-01-01 RX ADMIN — TAMSULOSIN HYDROCHLORIDE 0.4 MILLIGRAM(S): 0.4 CAPSULE ORAL at 22:52

## 2019-01-01 RX ADMIN — MEROPENEM 100 MILLIGRAM(S): 1 INJECTION INTRAVENOUS at 05:19

## 2019-01-01 RX ADMIN — Medication 20 MILLIEQUIVALENT(S): at 11:43

## 2019-01-01 RX ADMIN — CHLORHEXIDINE GLUCONATE 1 APPLICATION(S): 213 SOLUTION TOPICAL at 05:33

## 2019-01-01 RX ADMIN — HEPARIN SODIUM 5000 UNIT(S): 5000 INJECTION INTRAVENOUS; SUBCUTANEOUS at 07:57

## 2019-01-01 RX ADMIN — PANTOPRAZOLE SODIUM 40 MILLIGRAM(S): 20 TABLET, DELAYED RELEASE ORAL at 14:31

## 2019-01-01 RX ADMIN — Medication 40 MILLIEQUIVALENT(S): at 17:19

## 2019-01-01 RX ADMIN — Medication 3 MILLILITER(S): at 20:18

## 2019-01-01 RX ADMIN — CEFEPIME 100 MILLIGRAM(S): 1 INJECTION, POWDER, FOR SOLUTION INTRAMUSCULAR; INTRAVENOUS at 21:25

## 2019-01-01 RX ADMIN — Medication 200 MILLIGRAM(S): at 18:19

## 2019-01-01 RX ADMIN — Medication 1 MILLIGRAM(S): at 11:13

## 2019-01-01 RX ADMIN — Medication 3 MILLILITER(S): at 08:15

## 2019-01-01 RX ADMIN — CEFEPIME 100 MILLIGRAM(S): 1 INJECTION, POWDER, FOR SOLUTION INTRAMUSCULAR; INTRAVENOUS at 14:17

## 2019-01-01 RX ADMIN — Medication 200 MILLIGRAM(S): at 00:30

## 2019-01-01 RX ADMIN — Medication 650 MILLIGRAM(S): at 15:26

## 2019-01-01 RX ADMIN — Medication 40 MILLIGRAM(S): at 06:05

## 2019-01-01 RX ADMIN — Medication 40 MILLIGRAM(S): at 22:59

## 2019-01-01 RX ADMIN — HEPARIN SODIUM 5000 UNIT(S): 5000 INJECTION INTRAVENOUS; SUBCUTANEOUS at 14:30

## 2019-01-01 RX ADMIN — MEROPENEM 100 MILLIGRAM(S): 1 INJECTION INTRAVENOUS at 05:22

## 2019-01-01 RX ADMIN — Medication 3 MILLILITER(S): at 08:32

## 2019-01-01 RX ADMIN — Medication 40 MILLIGRAM(S): at 14:32

## 2019-01-01 RX ADMIN — Medication 3 MILLILITER(S): at 20:21

## 2019-01-01 RX ADMIN — Medication 110 MILLIGRAM(S): at 18:16

## 2019-01-01 RX ADMIN — Medication 250 MILLIGRAM(S): at 02:00

## 2019-01-01 RX ADMIN — Medication 110 MILLIGRAM(S): at 05:24

## 2019-01-01 RX ADMIN — SODIUM CHLORIDE 100 MILLILITER(S): 9 INJECTION INTRAMUSCULAR; INTRAVENOUS; SUBCUTANEOUS at 12:40

## 2019-01-01 RX ADMIN — MUPIROCIN 1 APPLICATION(S): 20 OINTMENT TOPICAL at 18:53

## 2019-01-01 RX ADMIN — MORPHINE SULFATE 2 MILLIGRAM(S): 50 CAPSULE, EXTENDED RELEASE ORAL at 03:22

## 2019-01-01 RX ADMIN — TRAMADOL HYDROCHLORIDE 50 MILLIGRAM(S): 50 TABLET ORAL at 12:50

## 2019-01-01 RX ADMIN — MEROPENEM 100 MILLIGRAM(S): 1 INJECTION INTRAVENOUS at 06:57

## 2019-01-01 RX ADMIN — MIDODRINE HYDROCHLORIDE 5 MILLIGRAM(S): 2.5 TABLET ORAL at 11:13

## 2019-01-01 RX ADMIN — SODIUM CHLORIDE 1000 MILLILITER(S): 9 INJECTION, SOLUTION INTRAVENOUS at 20:26

## 2019-01-01 RX ADMIN — Medication 650 MILLIGRAM(S): at 17:15

## 2019-01-01 RX ADMIN — MORPHINE SULFATE 3 MILLIGRAM(S): 50 CAPSULE, EXTENDED RELEASE ORAL at 17:38

## 2019-01-01 RX ADMIN — MIDODRINE HYDROCHLORIDE 5 MILLIGRAM(S): 2.5 TABLET ORAL at 13:22

## 2019-01-01 RX ADMIN — ENOXAPARIN SODIUM 40 MILLIGRAM(S): 100 INJECTION SUBCUTANEOUS at 11:43

## 2019-01-01 RX ADMIN — AMPICILLIN SODIUM AND SULBACTAM SODIUM 200 GRAM(S): 250; 125 INJECTION, POWDER, FOR SUSPENSION INTRAMUSCULAR; INTRAVENOUS at 05:25

## 2019-01-01 RX ADMIN — Medication 3 MILLILITER(S): at 20:38

## 2019-01-01 RX ADMIN — Medication 3 MILLILITER(S): at 08:39

## 2019-01-01 RX ADMIN — Medication 1 TABLET(S): at 12:53

## 2019-01-01 RX ADMIN — PANTOPRAZOLE SODIUM 40 MILLIGRAM(S): 20 TABLET, DELAYED RELEASE ORAL at 12:57

## 2019-01-01 RX ADMIN — Medication 3 MILLILITER(S): at 20:00

## 2019-01-01 RX ADMIN — PANTOPRAZOLE SODIUM 40 MILLIGRAM(S): 20 TABLET, DELAYED RELEASE ORAL at 06:03

## 2019-01-01 RX ADMIN — Medication 1 TABLET(S): at 05:50

## 2019-01-01 RX ADMIN — MUPIROCIN 1 APPLICATION(S): 20 OINTMENT TOPICAL at 17:12

## 2019-01-01 RX ADMIN — TAMSULOSIN HYDROCHLORIDE 0.4 MILLIGRAM(S): 0.4 CAPSULE ORAL at 21:29

## 2019-01-01 RX ADMIN — SODIUM CHLORIDE 1000 MILLILITER(S): 9 INJECTION INTRAMUSCULAR; INTRAVENOUS; SUBCUTANEOUS at 21:26

## 2019-01-01 RX ADMIN — HEPARIN SODIUM 5000 UNIT(S): 5000 INJECTION INTRAVENOUS; SUBCUTANEOUS at 05:25

## 2019-01-01 RX ADMIN — Medication 3 MILLILITER(S): at 07:45

## 2019-01-01 RX ADMIN — TAMSULOSIN HYDROCHLORIDE 0.4 MILLIGRAM(S): 0.4 CAPSULE ORAL at 21:21

## 2019-01-01 RX ADMIN — Medication 200 MILLIGRAM(S): at 05:22

## 2019-01-01 RX ADMIN — CEFEPIME 100 MILLIGRAM(S): 1 INJECTION, POWDER, FOR SOLUTION INTRAMUSCULAR; INTRAVENOUS at 21:27

## 2019-01-01 RX ADMIN — Medication 1 MILLIGRAM(S): at 09:49

## 2019-01-01 RX ADMIN — CEFEPIME 100 MILLIGRAM(S): 1 INJECTION, POWDER, FOR SOLUTION INTRAMUSCULAR; INTRAVENOUS at 22:10

## 2019-01-01 RX ADMIN — PANTOPRAZOLE SODIUM 40 MILLIGRAM(S): 20 TABLET, DELAYED RELEASE ORAL at 11:59

## 2019-01-01 RX ADMIN — MIDODRINE HYDROCHLORIDE 5 MILLIGRAM(S): 2.5 TABLET ORAL at 22:25

## 2019-01-01 RX ADMIN — Medication 1 TABLET(S): at 17:52

## 2019-01-01 RX ADMIN — CEFEPIME 100 MILLIGRAM(S): 1 INJECTION, POWDER, FOR SOLUTION INTRAMUSCULAR; INTRAVENOUS at 00:19

## 2019-01-01 RX ADMIN — ENOXAPARIN SODIUM 40 MILLIGRAM(S): 100 INJECTION SUBCUTANEOUS at 11:38

## 2019-01-01 RX ADMIN — PANTOPRAZOLE SODIUM 40 MILLIGRAM(S): 20 TABLET, DELAYED RELEASE ORAL at 13:26

## 2019-01-01 RX ADMIN — AMPICILLIN SODIUM AND SULBACTAM SODIUM 200 GRAM(S): 250; 125 INJECTION, POWDER, FOR SUSPENSION INTRAMUSCULAR; INTRAVENOUS at 17:13

## 2019-01-01 RX ADMIN — SODIUM CHLORIDE 100 MILLILITER(S): 9 INJECTION INTRAMUSCULAR; INTRAVENOUS; SUBCUTANEOUS at 02:12

## 2019-01-01 RX ADMIN — Medication 100 MILLIGRAM(S): at 05:19

## 2019-01-01 RX ADMIN — CEFEPIME 100 MILLIGRAM(S): 1 INJECTION, POWDER, FOR SOLUTION INTRAMUSCULAR; INTRAVENOUS at 14:20

## 2019-01-01 RX ADMIN — MORPHINE SULFATE 2 MILLIGRAM(S): 50 CAPSULE, EXTENDED RELEASE ORAL at 09:18

## 2019-01-01 RX ADMIN — Medication 3 MILLILITER(S): at 14:24

## 2019-01-01 RX ADMIN — CEFEPIME 100 MILLIGRAM(S): 1 INJECTION, POWDER, FOR SOLUTION INTRAMUSCULAR; INTRAVENOUS at 13:23

## 2019-01-01 RX ADMIN — MORPHINE SULFATE 2 MILLIGRAM(S): 50 CAPSULE, EXTENDED RELEASE ORAL at 03:19

## 2019-01-01 RX ADMIN — Medication 3 MILLILITER(S): at 19:43

## 2019-01-01 RX ADMIN — Medication 200 MILLIGRAM(S): at 07:57

## 2019-01-01 RX ADMIN — Medication 3 MILLILITER(S): at 13:53

## 2019-01-01 RX ADMIN — Medication 1 TABLET(S): at 11:43

## 2019-01-01 RX ADMIN — MORPHINE SULFATE 2 MILLIGRAM(S): 50 CAPSULE, EXTENDED RELEASE ORAL at 05:22

## 2019-01-01 RX ADMIN — Medication 3 MILLILITER(S): at 07:38

## 2019-01-01 RX ADMIN — SODIUM CHLORIDE 1000 MILLILITER(S): 9 INJECTION, SOLUTION INTRAVENOUS at 00:40

## 2019-01-01 RX ADMIN — Medication 3 MILLILITER(S): at 08:37

## 2019-01-01 RX ADMIN — Medication 3 MILLILITER(S): at 13:15

## 2019-01-01 RX ADMIN — CEFTRIAXONE 100 MILLIGRAM(S): 500 INJECTION, POWDER, FOR SOLUTION INTRAMUSCULAR; INTRAVENOUS at 15:38

## 2019-01-01 RX ADMIN — Medication 1 TABLET(S): at 12:57

## 2019-01-01 RX ADMIN — SCOPALAMINE 1 PATCH: 1 PATCH, EXTENDED RELEASE TRANSDERMAL at 17:15

## 2019-01-01 RX ADMIN — Medication 40 MILLIGRAM(S): at 18:03

## 2019-01-01 RX ADMIN — TAMSULOSIN HYDROCHLORIDE 0.4 MILLIGRAM(S): 0.4 CAPSULE ORAL at 21:54

## 2019-01-01 RX ADMIN — SODIUM CHLORIDE 3000 MILLILITER(S): 9 INJECTION, SOLUTION INTRAVENOUS at 21:04

## 2019-01-01 RX ADMIN — Medication 1 MILLIGRAM(S): at 11:41

## 2019-01-01 RX ADMIN — AMPICILLIN SODIUM AND SULBACTAM SODIUM 200 GRAM(S): 250; 125 INJECTION, POWDER, FOR SUSPENSION INTRAMUSCULAR; INTRAVENOUS at 05:46

## 2019-01-01 RX ADMIN — Medication 50 GRAM(S): at 16:53

## 2019-01-01 RX ADMIN — Medication 650 MILLIGRAM(S): at 22:00

## 2019-01-01 RX ADMIN — Medication 3 MILLILITER(S): at 13:13

## 2019-01-01 RX ADMIN — Medication 0.5 MILLIGRAM(S): at 18:53

## 2019-01-01 RX ADMIN — Medication 650 MILLIGRAM(S): at 15:20

## 2019-01-01 RX ADMIN — MIDODRINE HYDROCHLORIDE 5 MILLIGRAM(S): 2.5 TABLET ORAL at 21:15

## 2019-01-01 RX ADMIN — Medication 0.5 MILLIGRAM(S): at 22:27

## 2019-01-01 RX ADMIN — Medication 110 MILLIGRAM(S): at 06:31

## 2019-01-01 RX ADMIN — CHLORHEXIDINE GLUCONATE 1 APPLICATION(S): 213 SOLUTION TOPICAL at 05:11

## 2019-01-01 RX ADMIN — Medication 40 MILLIGRAM(S): at 05:22

## 2019-01-01 RX ADMIN — Medication 250 MILLIGRAM(S): at 22:54

## 2019-01-01 RX ADMIN — AZITHROMYCIN 255 MILLIGRAM(S): 500 TABLET, FILM COATED ORAL at 12:35

## 2019-01-01 RX ADMIN — MIDODRINE HYDROCHLORIDE 5 MILLIGRAM(S): 2.5 TABLET ORAL at 05:27

## 2019-01-01 RX ADMIN — HEPARIN SODIUM 5000 UNIT(S): 5000 INJECTION INTRAVENOUS; SUBCUTANEOUS at 05:27

## 2019-01-01 RX ADMIN — CEFEPIME 100 MILLIGRAM(S): 1 INJECTION, POWDER, FOR SOLUTION INTRAMUSCULAR; INTRAVENOUS at 06:05

## 2019-01-01 RX ADMIN — Medication 200 MILLIGRAM(S): at 23:05

## 2019-01-01 RX ADMIN — Medication 1 MILLIGRAM(S): at 12:19

## 2019-01-01 RX ADMIN — PANTOPRAZOLE SODIUM 40 MILLIGRAM(S): 20 TABLET, DELAYED RELEASE ORAL at 13:02

## 2019-01-01 RX ADMIN — MIDODRINE HYDROCHLORIDE 5 MILLIGRAM(S): 2.5 TABLET ORAL at 13:26

## 2019-01-01 RX ADMIN — MIDODRINE HYDROCHLORIDE 5 MILLIGRAM(S): 2.5 TABLET ORAL at 05:51

## 2019-01-01 RX ADMIN — Medication 3 MILLILITER(S): at 20:44

## 2019-01-01 RX ADMIN — SODIUM CHLORIDE 75 MILLILITER(S): 9 INJECTION, SOLUTION INTRAVENOUS at 01:35

## 2019-01-01 RX ADMIN — ENOXAPARIN SODIUM 40 MILLIGRAM(S): 100 INJECTION SUBCUTANEOUS at 12:59

## 2019-01-01 RX ADMIN — HEPARIN SODIUM 5000 UNIT(S): 5000 INJECTION INTRAVENOUS; SUBCUTANEOUS at 14:52

## 2019-01-01 RX ADMIN — ENOXAPARIN SODIUM 40 MILLIGRAM(S): 100 INJECTION SUBCUTANEOUS at 13:52

## 2019-01-01 RX ADMIN — ENOXAPARIN SODIUM 40 MILLIGRAM(S): 100 INJECTION SUBCUTANEOUS at 11:10

## 2019-01-01 RX ADMIN — MORPHINE SULFATE 2 MILLIGRAM(S): 50 CAPSULE, EXTENDED RELEASE ORAL at 05:56

## 2019-01-01 RX ADMIN — Medication 200 MILLIGRAM(S): at 11:45

## 2019-01-01 RX ADMIN — Medication 650 MILLIGRAM(S): at 16:05

## 2019-01-01 RX ADMIN — MIDODRINE HYDROCHLORIDE 5 MILLIGRAM(S): 2.5 TABLET ORAL at 14:31

## 2019-01-01 RX ADMIN — Medication 3 MILLILITER(S): at 20:07

## 2019-01-01 RX ADMIN — CHLORHEXIDINE GLUCONATE 1 APPLICATION(S): 213 SOLUTION TOPICAL at 05:21

## 2019-01-01 RX ADMIN — MIDODRINE HYDROCHLORIDE 5 MILLIGRAM(S): 2.5 TABLET ORAL at 13:41

## 2019-01-01 RX ADMIN — PANTOPRAZOLE SODIUM 40 MILLIGRAM(S): 20 TABLET, DELAYED RELEASE ORAL at 11:17

## 2019-01-01 RX ADMIN — MIDODRINE HYDROCHLORIDE 5 MILLIGRAM(S): 2.5 TABLET ORAL at 07:58

## 2019-01-01 RX ADMIN — Medication 3 MILLILITER(S): at 07:40

## 2019-01-01 RX ADMIN — Medication 100 MILLIGRAM(S): at 17:18

## 2019-01-01 RX ADMIN — MORPHINE SULFATE 2 MILLIGRAM(S): 50 CAPSULE, EXTENDED RELEASE ORAL at 23:48

## 2019-01-01 RX ADMIN — MIDODRINE HYDROCHLORIDE 5 MILLIGRAM(S): 2.5 TABLET ORAL at 05:19

## 2019-01-01 RX ADMIN — Medication 1 MILLIGRAM(S): at 12:03

## 2019-01-01 RX ADMIN — ENOXAPARIN SODIUM 40 MILLIGRAM(S): 100 INJECTION SUBCUTANEOUS at 11:49

## 2019-01-01 RX ADMIN — Medication 3 MILLILITER(S): at 13:56

## 2019-01-01 RX ADMIN — SODIUM CHLORIDE 500 MILLILITER(S): 9 INJECTION INTRAMUSCULAR; INTRAVENOUS; SUBCUTANEOUS at 01:02

## 2019-01-01 RX ADMIN — Medication 3 MILLILITER(S): at 02:47

## 2019-01-01 RX ADMIN — Medication 3 MILLILITER(S): at 02:23

## 2019-01-01 RX ADMIN — Medication 650 MILLIGRAM(S): at 16:54

## 2019-01-01 RX ADMIN — HEPARIN SODIUM 5000 UNIT(S): 5000 INJECTION INTRAVENOUS; SUBCUTANEOUS at 13:22

## 2019-01-01 RX ADMIN — Medication 650 MILLIGRAM(S): at 18:59

## 2019-01-01 RX ADMIN — HEPARIN SODIUM 5000 UNIT(S): 5000 INJECTION INTRAVENOUS; SUBCUTANEOUS at 05:22

## 2019-01-01 RX ADMIN — ENOXAPARIN SODIUM 40 MILLIGRAM(S): 100 INJECTION SUBCUTANEOUS at 12:53

## 2019-01-01 RX ADMIN — Medication 100 MILLIGRAM(S): at 17:48

## 2019-01-01 RX ADMIN — TAMSULOSIN HYDROCHLORIDE 0.4 MILLIGRAM(S): 0.4 CAPSULE ORAL at 21:48

## 2019-01-01 RX ADMIN — HEPARIN SODIUM 5000 UNIT(S): 5000 INJECTION INTRAVENOUS; SUBCUTANEOUS at 21:21

## 2019-01-01 RX ADMIN — Medication 1 TABLET(S): at 11:17

## 2019-01-01 RX ADMIN — Medication 1 MILLIGRAM(S): at 16:26

## 2019-01-01 RX ADMIN — ENOXAPARIN SODIUM 40 MILLIGRAM(S): 100 INJECTION SUBCUTANEOUS at 21:10

## 2019-01-01 RX ADMIN — MIDODRINE HYDROCHLORIDE 5 MILLIGRAM(S): 2.5 TABLET ORAL at 05:34

## 2019-01-01 RX ADMIN — SENNA PLUS 2 TABLET(S): 8.6 TABLET ORAL at 23:00

## 2019-01-01 RX ADMIN — Medication 650 MILLIGRAM(S): at 13:33

## 2019-01-01 RX ADMIN — Medication 650 MILLIGRAM(S): at 20:35

## 2019-01-01 RX ADMIN — CEFTRIAXONE 100 MILLIGRAM(S): 500 INJECTION, POWDER, FOR SOLUTION INTRAMUSCULAR; INTRAVENOUS at 14:55

## 2019-01-01 RX ADMIN — Medication 1 TABLET(S): at 17:38

## 2019-01-01 RX ADMIN — CEFEPIME 100 MILLIGRAM(S): 1 INJECTION, POWDER, FOR SOLUTION INTRAMUSCULAR; INTRAVENOUS at 14:46

## 2019-01-01 RX ADMIN — Medication 3 MILLILITER(S): at 08:24

## 2019-01-01 RX ADMIN — Medication 3 MILLILITER(S): at 21:13

## 2019-01-01 RX ADMIN — Medication 3 MILLILITER(S): at 13:24

## 2019-01-01 RX ADMIN — Medication 250 MILLIGRAM(S): at 17:57

## 2019-01-01 RX ADMIN — CHLORHEXIDINE GLUCONATE 1 APPLICATION(S): 213 SOLUTION TOPICAL at 02:40

## 2019-01-01 RX ADMIN — Medication 50 GRAM(S): at 17:20

## 2019-01-01 RX ADMIN — CHLORHEXIDINE GLUCONATE 1 APPLICATION(S): 213 SOLUTION TOPICAL at 06:25

## 2019-01-01 RX ADMIN — CHLORHEXIDINE GLUCONATE 1 APPLICATION(S): 213 SOLUTION TOPICAL at 05:19

## 2019-01-01 RX ADMIN — Medication 3 MILLILITER(S): at 08:40

## 2019-01-01 RX ADMIN — MIDODRINE HYDROCHLORIDE 5 MILLIGRAM(S): 2.5 TABLET ORAL at 05:22

## 2019-01-01 RX ADMIN — TAMSULOSIN HYDROCHLORIDE 0.4 MILLIGRAM(S): 0.4 CAPSULE ORAL at 23:55

## 2019-01-01 RX ADMIN — CHLORHEXIDINE GLUCONATE 1 APPLICATION(S): 213 SOLUTION TOPICAL at 05:48

## 2019-01-01 RX ADMIN — SODIUM CHLORIDE 500 MILLILITER(S): 9 INJECTION, SOLUTION INTRAVENOUS at 22:04

## 2019-01-01 RX ADMIN — LIDOCAINE 1 APPLICATION(S): 4 CREAM TOPICAL at 17:30

## 2019-01-01 RX ADMIN — SODIUM CHLORIDE 75 MILLILITER(S): 9 INJECTION INTRAMUSCULAR; INTRAVENOUS; SUBCUTANEOUS at 10:53

## 2019-01-01 RX ADMIN — CHLORHEXIDINE GLUCONATE 1 APPLICATION(S): 213 SOLUTION TOPICAL at 06:31

## 2019-01-01 RX ADMIN — Medication 650 MILLIGRAM(S): at 15:15

## 2019-01-01 RX ADMIN — SODIUM CHLORIDE 75 MILLILITER(S): 9 INJECTION, SOLUTION INTRAVENOUS at 11:00

## 2019-01-01 RX ADMIN — Medication 3 MILLILITER(S): at 13:06

## 2019-01-01 RX ADMIN — Medication 650 MILLIGRAM(S): at 17:14

## 2019-01-01 RX ADMIN — ENOXAPARIN SODIUM 40 MILLIGRAM(S): 100 INJECTION SUBCUTANEOUS at 11:14

## 2019-01-01 RX ADMIN — TAMSULOSIN HYDROCHLORIDE 0.4 MILLIGRAM(S): 0.4 CAPSULE ORAL at 22:03

## 2019-01-01 RX ADMIN — PANTOPRAZOLE SODIUM 40 MILLIGRAM(S): 20 TABLET, DELAYED RELEASE ORAL at 13:06

## 2019-01-01 RX ADMIN — CHLORHEXIDINE GLUCONATE 1 APPLICATION(S): 213 SOLUTION TOPICAL at 05:24

## 2019-01-01 RX ADMIN — Medication 200 MILLIGRAM(S): at 17:39

## 2019-01-01 RX ADMIN — CEFEPIME 100 MILLIGRAM(S): 1 INJECTION, POWDER, FOR SOLUTION INTRAMUSCULAR; INTRAVENOUS at 01:38

## 2019-01-01 RX ADMIN — HEPARIN SODIUM 5000 UNIT(S): 5000 INJECTION INTRAVENOUS; SUBCUTANEOUS at 21:49

## 2019-01-01 RX ADMIN — SODIUM CHLORIDE 75 MILLILITER(S): 9 INJECTION, SOLUTION INTRAVENOUS at 14:34

## 2019-01-01 RX ADMIN — Medication 1 TABLET(S): at 13:26

## 2019-01-01 RX ADMIN — SODIUM CHLORIDE 1000 MILLILITER(S): 9 INJECTION INTRAMUSCULAR; INTRAVENOUS; SUBCUTANEOUS at 17:00

## 2019-01-01 RX ADMIN — Medication 3 MILLILITER(S): at 08:31

## 2019-01-01 RX ADMIN — Medication 250 MILLIGRAM(S): at 17:18

## 2019-01-01 RX ADMIN — Medication 3 MILLILITER(S): at 08:00

## 2019-01-01 RX ADMIN — SODIUM CHLORIDE 50 MILLILITER(S): 9 INJECTION, SOLUTION INTRAVENOUS at 05:19

## 2019-01-01 RX ADMIN — MORPHINE SULFATE 2 MILLIGRAM(S): 50 CAPSULE, EXTENDED RELEASE ORAL at 10:16

## 2019-01-01 RX ADMIN — CEFTRIAXONE 100 MILLIGRAM(S): 500 INJECTION, POWDER, FOR SOLUTION INTRAMUSCULAR; INTRAVENOUS at 00:24

## 2019-01-01 RX ADMIN — Medication 3 MILLILITER(S): at 08:11

## 2019-01-01 RX ADMIN — Medication 40 MILLIGRAM(S): at 14:45

## 2019-01-01 RX ADMIN — Medication 1 TABLET(S): at 06:42

## 2019-01-01 RX ADMIN — Medication 100 MILLIGRAM(S): at 05:48

## 2019-01-01 RX ADMIN — Medication 1 TABLET(S): at 18:13

## 2019-01-01 RX ADMIN — TAMSULOSIN HYDROCHLORIDE 0.4 MILLIGRAM(S): 0.4 CAPSULE ORAL at 21:56

## 2019-01-01 RX ADMIN — Medication 650 MILLIGRAM(S): at 14:07

## 2019-01-01 RX ADMIN — Medication 1 TABLET(S): at 05:15

## 2019-01-01 RX ADMIN — Medication 650 MILLIGRAM(S): at 11:39

## 2019-01-01 RX ADMIN — Medication 1 TABLET(S): at 17:51

## 2019-01-01 RX ADMIN — Medication 3 MILLILITER(S): at 13:33

## 2019-07-28 NOTE — ED ADULT TRIAGE NOTE - CHIEF COMPLAINT QUOTE
As per family patient had a fever earlier today. States he has not been eating much. Patient has had a cough with phlegm. patient noted with o2 saturation 90% room air in triage.

## 2019-07-28 NOTE — ED PROVIDER NOTE - NS ED ROS FT
Review of Systems:  •	CONSTITUTIONAL - + fever, No diaphoresis, No weight change  •	SKIN - No rash  •	HEMATOLOGIC - No abnormal bleeding or bruising  •	EYES - No eye pain, No blurred vision  •	ENT - No change in hearing, No sore throat, No neck pain, No rhinorrhea, No ear pain  •	RESPIRATORY - + shortness of breath, + cough  •	CARDIAC -No chest pain, No palpitations  •	GI - No abdominal pain, No nausea, No vomiting, No diarrhea, No constipation, No bright red blood per rectum or melena. No flank pain  •             - No dysuria, frequency, hematuria.   •	ENDO - No polydypsia, No polyuria, No heat/cold intolerance  •	MUSCULOSKELETAL - No joint paint, No swelling, No back pain  •	NEUROLOGIC - No numbness, No focal weakness, No headache, No dizziness  All other systems negative, unless specified in HPI

## 2019-07-28 NOTE — ED PROVIDER NOTE - PHYSICAL EXAMINATION
CONSTITUTIONAL: Elderly, cachectic   SKIN: warm, dry  HEAD: Normocephalic; atraumatic.  EYES: no conjunctival injection. PERRL.   ENT: No nasal discharge; airway clear.  NECK: Supple; non tender.  CARD: S1, S2 normal; no murmurs, gallops, or rubs. Regular rate and rhythm.   RESP: Crackles diffusely.   ABD: soft ntnd  EXT: Normal ROM.  No clubbing, cyanosis or edema.   LYMPH: No acute cervical adenopathy.  NEURO: Alert, oriented, grossly unremarkable  PSYCH: Cooperative, appropriate.

## 2019-07-28 NOTE — ED PROVIDER NOTE - ATTENDING CONTRIBUTION TO CARE
Code sepsis called.  IV fluid resuscitation started with Lactated Ringers and patient given broad spectrum antibiotics.  Patient appears dry. Pulmonary embolism also considered given patient is hypoxic and tachycardic.  CXR done and shows interstitial lung disease.    HR improved to 80's with fluid resuscitation.  Will follow up work up and reassess.  Patient will require admission for IV antibiotics and continued care/treatment. I personally evaluated patient. I agree with the findings and plan with all documentation on chart except as documented  in my note.    78 y/o male with PMH of HTN, COPD current smoker not on home oxygen who presents to ED for 2 days of increased cough, congestion, fever and generalized weakness. No chest pain. No recent travel. No ill contacts. Patient has > 70 pack year smoking history.    Patient has increased dyspnea with minimal exertion.  Fever started today. + cough with increased sputum production and this is worse/different than baseline cough.    On exam, patient initially tachycardic.  patient later developed a fever.  No source identified until chest x-ray reviewed. Code sepsis called.  Appropriate labs and cultures sent. IV fluid resuscitation started with Lactated Ringers and patient given broad spectrum antibiotics.  Patient appears dry. Pulmonary embolism also considered given patient is hypoxic and tachycardic.  CXR done and shows interstitial lung disease.    Initial lactate 2.2.  This was repeated and normal after fluids.     HR improved to 80's with fluid resuscitation.  Will follow up work up and reassess.  Patient will require admission for IV antibiotics and continued care/treatment.

## 2019-07-28 NOTE — ED PROVIDER NOTE - CLINICAL SUMMARY MEDICAL DECISION MAKING FREE TEXT BOX
Patient has clinical picture concerning for sepsis.  IV fluids and IV antibiotics given.  CT scan done and shows possible pneumonia, along with interstitial lung changes.  Patient could not urinate and required Martinez placement. Repeat perfusion sepsis exam normal and patient is not fluid overloaded.  PORT score warrants admission for pneumonia and patient requires continued sepsis treatment and care.

## 2019-07-28 NOTE — ED PROVIDER NOTE - PROGRESS NOTE DETAILS
Patient febrile.  Code sepsis called.  Patient 45 kg and appropriate fluid ordered.  Cefepime/Vanco given for broad spectrum coverage. Initial lactate 2.2.  Will repeat.  Repeat sepsis perfusion exam done.  Patient has pulses in all 4 extremities, skin is warm, good cap refill. Avtar fuller: + diffuse b-lines Patient to be admitted to an inpatient floor. Case discussed with and care endorsed to medical admitting resident.   Dr Jordan texted to call back for admission

## 2019-07-28 NOTE — ED ADULT NURSE NOTE - OBJECTIVE STATEMENT
pt brought to ED by family, reports of having fevers at home. pt presents with shortness of breath, low pulse ox on room air, mild increase in work of breathing, cough

## 2019-07-28 NOTE — ED PROVIDER NOTE - OBJECTIVE STATEMENT
78 y/o male with PMH of HTN, COPD current smoker not on home oxygen who presents to ED for 2 days of increased cough, congestion, fever and generalized weakness. No chest pain. No recent travel. No ill contacts. 80 y/o male with PMH of HTN, COPD current smoker not on home oxygen who presents to ED for 2 days of increased cough, congestion, fever and generalized weakness. No chest pain. No recent travel. No ill contacts. Patient has > 70 pack year smoking history.    Patient has increased dyspnea with minimal exertion.  Fever started today. + cough with increased sputum production and this is worse/different than baseline cough.

## 2019-07-29 NOTE — H&P ADULT - NSHPPHYSICALEXAM_GEN_ALL_CORE
GENERAL: NAD, cachectic   HEAD:  Atraumatic, Normocephalic  EYES: EOMI, PERRLA, conjunctiva and sclera clear  ENMT: poor dentition  NECK: Supple, No JVD, Normal thyroid  NERVOUS SYSTEM:  Alert & Oriented X2, doesn't speak english, not cooperative . poor concentration; Motor Strength 5/5 B/L upper and lower extremities; DTRs 2+ intact and symmetric  CHEST/LUNG: GBAE ; b/l  rales, NO rhonchi, wheezing, or rubs  HEART: Regular rate and rhythm; No murmurs, rubs, or gallops  ABDOMEN: Soft, Nontender, Nondistended; Bowel sounds present, Martinez catheter inserted   EXTREMITIES:  2+ Peripheral Pulses, No clubbing, cyanosis, or edema  LYMPH: No lymphadenopathy noted

## 2019-07-29 NOTE — H&P ADULT - ASSESSMENT
78 yo M  PMH: HTN, COPD not on home o2 , currently smoker   CC: presented for fever and cough  In ED: Sepsis code was called. Patient was given IVF's and Vanco/Zosyn. Cultures pending. CT showed PNA.    ASSESSMENT / PLAN:    # Gram negative PNA:  - ABX: Azithromycin and Ceftriaxone  - Cultures: Urine and Blood, f/u  - MRSA / RVP  - Urine step and legionella  - CT indicative of PNA like picture  - UA negative  - ID consult     # urinary Incontinence:  - Martinez inserted by ED  - Maintain for now  - Renal bladder US  - Can remove once physical status improves  - PT Rehab evaluation  - OOB to chair     # HTN: Monitor BP. If needed begin ACE inhibitor    # COPD:  - Duonebs prn  - Maintain Saturation > 92%  - Supplemental oxygen as needed    # OTHERS:  dvt ppx  pg ppx not indicated  chg daily and prn for incontinence   Full code    ** Unable to contact family - does not answer nu,jose a listed in chart  Called PMD listed, Dr. Jordan notes this is not a patient of his. No record of patient on file or medications in file. 80 yo M  PMH: HTN, COPD not on home o2 , currently smoker   CC: presented for fever and cough  In ED: Sepsis code was called. Patient was given IVF's and Vanco/Zosyn. Cultures pending. CT showed PNA.    ASSESSMENT / PLAN:    # Gram negative PNA:  - ABX: Azithromycin and Ceftriaxone  - Cultures: Urine and Blood, f/u  - MRSA / RVP  - Urine step and legionella  - CT indicative of PNA like picture  - UA negative  - ID consult     # urinary Incontinence:  - Martinez inserted by ED  - Maintain for now  - Renal bladder US  - Can remove once physical status improves  - PT Rehab evaluation  - OOB to chair     # HTN: Monitor BP. If needed begin ACE inhibitor    # COPD:  - Duonebs prn  - Maintain Saturation > 92%  - Supplemental oxygen as needed    # OTHERS:  dvt ppx  pg ppx not indicated  chg daily and prn for incontinence   Full code    ** Unable to contact family - does not answer nu,jose a listed in chart  Called PMD listed, Dr. Jordan notes this is not a patient of his. No record of patient on file or medications in file.       ** unable to reconcil med rec-- please reconcil med rec when patient's family are present or  contacted .

## 2019-07-29 NOTE — ED PROCEDURE NOTE - CPROC ED SITE PREP1
povidone iodine Positioning (Leave Blank If You Do Not Want): The patient was placed in a comfortable position exposing the surgical site.

## 2019-07-29 NOTE — H&P ADULT - NSHPLABSRESULTS_GEN_ALL_CORE
13.7   17.53 )-----------( 195      ( 2019 21:09 )             41.3           141  |  101  |  17  ----------------------------<  160<H>  4.2   |  24  |  1.1    Ca    9.7      2019 21:09    TPro  8.1<H>  /  Alb  3.7  /  TBili  0.8  /  DBili  x   /  AST  19  /  ALT  8   /  AlkPhos  85                Urinalysis Basic - ( 2019 00:30 )    Color: Yellow / Appearance: Clear / S.021 / pH: x  Gluc: x / Ketone: Trace  / Bili: Negative / Urobili: <2 mg/dL   Blood: x / Protein: 30 mg/dL / Nitrite: Negative   Leuk Esterase: Negative / RBC: 28 /HPF / WBC 2 /HPF   Sq Epi: x / Non Sq Epi: 2 /HPF / Bacteria: Negative        PT/INR - ( 2019 21:09 )   PT: 15.50 sec;   INR: 1.35 ratio         PTT - ( 2019 21:09 )  PTT:30.0 sec    Lactate Trend            CAPILLARY BLOOD GLUCOSE                  < from: CT Angio Chest w/ IV Cont (19 @ 01:01) >    No acute pulmonary embolus.    Patchy nodularity in the right lower lobe, limited by respiratory motion   which may reflect tree-in-bud nodules versus alveolar opacities and for   which differential includes airway infection such as bronchitis versus   pneumonia.      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    < end of copied text >

## 2019-07-29 NOTE — H&P ADULT - ATTENDING COMMENTS
Discussed case with son, Lizbet, on 7/29/19 at 20:34 via phone [phone]    Nephew, Kanu, present by bedside.    PHYSICAL EXAM:    CONSTITUTIONAL: NAD, nontoxic appearing, thin, comfortable lying in bed  ENMT: EOMI, PERRLA, No tonsillar erythema, exudates, or enlargement, neck supple, No JVD  PSYCH: Alert and oriented x2  RESPIRATORY: Decreased air entry, mild right basilar rhonchi, negative wheezing  CARDIOVASCULAR: Regular rate and rhythm; No murmurs, rubs, or gallops, negative edema  GASTROINTESTINAL: Soft, Nontender, Nondistended; Bowel sounds present  MSK:  2+ Peripheral Pulses, No clubbing, cyanosis  SKIN: No rashes or lesions    78 yo M with PMHx of HTN, COPD not on Home O2, last seen by PMD over 5 years ago and not on any home medications as per son, presents with complaint of productive cough of whitish sputum for 2 weeks duration associated with fevers and generalized weakness as of yesterday. Patient resides with his wife, son and daughter in law. As per son, patient was previously independent with all activities of daily living and ambulates unassisted, however in the past few day has been unable to walk secondary to weakness and exhibiting decreased PO intake. Son also endorses history of difficulty swallowing foods, although denies any episodes of choking. No other complaints noted, patient has not had any sick contacts or recent travel.    #Sepsis secondary to RLL Pneumonia vs. Bronchitis vs. Aspiration Pneumonia  -Received Cefepime, Vancomycin and Azithromycin in ED, continue Cefepime and Levaquin  -F/U Blood cultures, sputum culture  -F/U speech and swallow assessment for need of modified diet, NPO with maintenance fluid in interim   -Pulmonary toilet  -Incentive Spirometry  -Nebs Q6H and PRN  -Consider Pulmonary evaluation if worsening progression, will need outpatient follow up     #History of HTN  -Blood pressures currently on lower end, monitor BP    #Hypomagnesemia, repleted  -Monitor electrolytes     Code Status: Full Code    Disposition: PT/Rehab evaluation Discussed case with son, Lizbet, on 7/29/19 at 20:34 via phone [phone]    Nephew, Kanu, present by bedside.    PHYSICAL EXAM:    CONSTITUTIONAL: NAD, nontoxic appearing, thin, comfortable lying in bed  ENMT: EOMI, PERRLA, No tonsillar erythema, exudates, or enlargement, neck supple, No JVD  PSYCH: Alert and oriented x2  RESPIRATORY: Decreased air entry, mild right basilar rhonchi, negative wheezing  CARDIOVASCULAR: Regular rate and rhythm; No murmurs, rubs, or gallops, negative edema  GASTROINTESTINAL: Soft, Nontender, Nondistended; Bowel sounds present  MSK:  2+ Peripheral Pulses, No clubbing, cyanosis  SKIN: No rashes or lesions    80 yo M with PMHx of HTN, COPD not on Home O2, last seen by PMD over 5 years ago and not on any home medications as per son, presents with complaint of productive cough of whitish sputum for 2 weeks duration associated with fevers and generalized weakness as of yesterday. Patient resides with his wife, son and daughter in law. As per son, patient was previously independent with all activities of daily living and ambulates unassisted, however in the past few day has been unable to walk secondary to weakness and exhibiting decreased PO intake. Son also endorses history of difficulty swallowing foods, although denies any episodes of choking. No other complaints noted, patient has not had any sick contacts or recent travel.    #Sepsis secondary to RLL Pneumonia vs. Bronchitis vs. Aspiration Pneumonia  -Received Cefepime, Vancomycin and Azithromycin in ED, continue Cefepime and Levaquin  -F/U Blood cultures, sputum culture  -F/U speech and swallow assessment for need of modified diet, NPO with maintenance fluid in interim   -Pulmonary toilet  -Incentive Spirometry  -Nebs Q6H and PRN  -Consider Pulmonary evaluation if worsening progression, will need outpatient follow up     #History of HTN  -Blood pressures currently on lower end, monitor BP    #Hypomagnesemia, repleted  -Monitor electrolytes     #Urinary Retention  -No documentation of initial mendoza output  -TOV in AM  -UA negative    Code Status: Full Code    Disposition: PT/Rehab evaluation

## 2019-07-29 NOTE — H&P ADULT - HISTORY OF PRESENT ILLNESS
78 yo M  PMH: HTN , COPD not on home o2 , currently smoker   CC: presented for fever and cough  History: Hx cannot be obtained because patient is non cooperative, not english speaking , and no family members were at bedside. Tried to contact the by phone , no response.   Called PMD lsited , Dr. Jordan states no history of patient in office  As per ED Documentation:  "78 y/o male with PMH of HTN, COPD current smoker not on home oxygen who presents to ED for 2 days of increased cough, congestion, fever and generalized weakness. No chest pain. No recent travel. No ill contacts."

## 2019-07-29 NOTE — H&P ADULT - NSHPSOCIALHISTORY_GEN_ALL_CORE
[facility] - 12/15 EGA - 40.1     -Report received from IRAIS Villasenor RN. External monitors in place X2. Category I FHT at this time. VSS. FOB at bedside. POC discussed with pt and family members, all questions answered.   Dr. Britta Becerril at bedside at this time. SVE 4/100/-2 AROM clear fluid at this time. Orders received to start pitocin at this time.   Pitocin started per MAR  2330 SVE 5-6/100/-2  5 Dr. Britta Becerril updated on pt status at this time.   Dr. Britta Becerril at bedside at this time. SVE 6/100/-2 IUPC and FSE placed at this time. Orders received.  0004 Pt repostioned onto left side at this time with peanut ball in place at this time.  0007 Pt repositoned onto right side at this time, oxygen applied via face mask at 10L.  0008 pitocin turned down at this time.  0331 SVE complete/+1 Test push at this time. Dr. Britta Becerril notified, orders to start pushing.   0346 Pt repositioned into stirrups, pt educated on proper pushing technique. RN continuously at bedside evaluating FHT and pushing progress.  0435 Dr. Britta Becerril at bedside for delivery.  0441  of viable female infant by Dr. Britta Becerril to maternal abdomen. Apgars 7/9    0443 Placenta delivered intact.  Abrasion noted at this time, no repair.  0500 Fundus firm lochia light  0550 Pt ambulated to bathroom in stable condition at this time. Pt able to void. [place] pads changed.   0610 Pt transferred to  in stable condition at this time, via wheelchair with infant in arms at this time.   0620 Report given to MATEO Stacy RN. Infant bands matched x2 cuddles active.     Patient is a current smoker, denies ETOH and illicit drug abuse

## 2019-07-29 NOTE — CONSULT NOTE ADULT - ASSESSMENT
IMPRESSION: Rehab of   debility, pneumonia, COPD    PRECAUTIONS: [  ] Cardiac  x] Respiratory  [  ] Seizures [  ] Contact Isolation  [  ] Droplet Isolation  [  ] Other    Weight Bearing Status:   Pursed lip breathing with amb., B[place]an speaking    RECOMMENDATION:    Out of Bed to Chair     DVT/Decubiti Prophylaxis    REHAB PLAN:     [ xx  ] Bedside P/T 3-5 times a week   [   ]   Bedside O/T  2-3 times a week             [   ] No Rehab Therapy Indicated                   [   ]  Speech Therapy   Conditioning/ROM                                    ADL  Bed Mobility                                               Conditioning/ROM  Transfers                                                     Bed Mobility  Sitting /Standing Balance                         Transfers                                        Gait Training                                               Sitting/Standing Balance  Stair Training [   ]Applicable                    Home equipment Eval                                                                        Splinting  [   ] Only      GOALS:   ADL   [x   ]   Independent                    Transfers  [x   ] Independent                          Ambulation  [ x  ] Independent     [ x   ] With device                            [   ]  CG                                                         [   ]  CG                                                                  [   ] CG                            [    ] Min A                                                   [   ] Min A                                                              [   ] Min  A          DISCHARGE PLAN:   [   ]  Good candidate for Intensive Rehabilitation/Hospital based-4A [facility]                                             Will tolerate 3hrs Intensive Rehab Daily                                       [    ]  Short Term Rehab in Skilled Nursing Facility                                       [  xx  ]  Home with Outpatient or VN services                                         [    ]  Possible Candidate for Intensive Hospital based Rehab IMPRESSION: Rehab of   debility, pneumonia, COPD    PRECAUTIONS: [  ] Cardiac  x] Respiratory  [  ] Seizures [  ] Contact Isolation  [  ] Droplet Isolation  [  ] Other    Weight Bearing Status:   Pursed lip breathing with amb., Somali speaking  O2 2L N/C PRN    RECOMMENDATION:    Out of Bed to Chair     DVT/Decubiti Prophylaxis    REHAB PLAN:     [ xx  ] Bedside P/T 3-5 times a week   [   ]   Bedside O/T  2-3 times a week             [   ] No Rehab Therapy Indicated                   [   ]  Speech Therapy   Conditioning/ROM                                    ADL  Bed Mobility                                               Conditioning/ROM  Transfers                                                     Bed Mobility  Sitting /Standing Balance                         Transfers                                        Gait Training                                               Sitting/Standing Balance  Stair Training [   ]Applicable                    Home equipment Eval                                                                        Splinting  [   ] Only      GOALS:   ADL   [x   ]   Independent                    Transfers  [x   ] Independent                          Ambulation  [ x  ] Independent     [ x   ] With device                            [   ]  CG                                                         [   ]  CG                                                                  [   ] CG                            [    ] Min A                                                   [   ] Min A                                                              [   ] Min  A          DISCHARGE PLAN:   [   ]  Good candidate for Intensive Rehabilitation/Hospital based-4A [facility]                                             Will tolerate 3hrs Intensive Rehab Daily                                       [    ]  Short Term Rehab in Skilled Nursing Facility                                       [  xx  ]  Home with Outpatient or  services                                         [    ]  Possible Candidate for Intensive Hospital based Rehab

## 2019-07-29 NOTE — CONSULT NOTE ADULT - SUBJECTIVE AND OBJECTIVE BOX
HPI:  80 yo M  PMH: HTN , COPD not on home o2 , currently smoker   CC: presented for fever and cough  History: Hx cannot be obtained because patient is non cooperative, not english speaking , and no family members were at bedside. Tried to contact the by phone , no response.   Called PMD lsited , Dr. Jordan states no history of patient in office  As per ED Documentation:  "78 y/o male with PMH of HTN, COPD current smoker not on home oxygen who presents to ED for 2 days of increased cough, congestion, fever and generalized weakness. No chest pain. No recent travel. No ill contacts." (2019 10:58)      PAST MEDICAL & SURGICAL HISTORY:      Hospital Course:  CT c/w pneumonia. He normally ambulates without ad. he lives with his son. his son translates at his request. On IV antibiotics.  TODAY'S SUBJECTIVE & REVIEW OF SYMPTOMS:     Constitutional WNL   Cardio WNL   Resp WNL   GI WNL  Heme WNL  Endo WNL  Skin WNL  MSK WNL  Neuro WNL  Cognitive WNL  Psych WNL      MEDICATIONS  (STANDING):  ALBUTerol/ipratropium for Nebulization 3 milliLiter(s) Nebulizer every 6 hours  cefepime   IVPB 2000 milliGRAM(s) IV Intermittent every 8 hours  heparin  Injectable 5000 Unit(s) SubCutaneous every 8 hours  tamsulosin 0.4 milliGRAM(s) Oral at bedtime  vancomycin  IVPB 750 milliGRAM(s) IV Intermittent every 12 hours    MEDICATIONS  (PRN):      FAMILY HISTORY:      Allergies    No Known Allergies    Intolerances        SOCIAL HISTORY:    [  ] Etoh  [  ] Smoking  [  ] Substance abuse     Home Environment:  [  ] Home Alone  [  ] Lives with Family  [  ] Home Health Aid    Dwelling:  [  ] Apartment  [  ] Private House  [  ] Adult Home  [  ] Skilled Nursing Facility      [  ] Short Term  [  ] Long Term  [  ] Stairs       Elevator [  ]    FUNCTIONAL STATUS PTA: (Check all that apply)  Ambulation: [ x  ]Independent    [  ] Dependent     [  ] Non-Ambulatory  Assistive Device: [  ] SA Cane  [  ]  Q Cane  [  ] Walker  [  ]  Wheelchair  ADL : [  ] Independent  [  ]  Dependent       Vital Signs Last 24 Hrs  T(C): 35.6 (2019 13:20), Max: 38.7 (2019 21:13)  T(F): 96 (2019 13:20), Max: 101.6 (2019 21:13)  HR: 82 (2019 13:20) (75 - 125)  BP: 91/45 (2019 13:20) (91/45 - 113/56)  BP(mean): --  RR: 18 (2019 13:20) (18 - 22)  SpO2: 99% (2019 07:30) (90% - 99%)      PHYSICAL EXAM: Alert & Oriented X3  GENERAL: NAD, well-groomed, well-developed  HEAD:  Atraumatic, Normocephalic  EYES: EOMI, PERRLA, conjunctiva and sclera clear  NECK: Supple, No JVD, Normal thyroid  CHEST/LUNG:  decreasedbs  HEART: Regular rate and rhythm; No murmurs, rubs, or gallops  ABDOMEN: Soft, Nontender, Nondistended; Bowel sounds present  EXTREMITIES:  2+ Peripheral Pulses, No clubbing, cyanosis, or edema    NERVOUS SYSTEM:  Cranial Nerves 2-12 intact [  ] Abnormal  [  ]  ROM: WFL all extremities [  ]  Abnormal [  ]  Motor Strength: WFL all extremities  [  ]  Abnormal [ x ]  5-/5 :LE's  Sensation: intact to light touch [  ] Abnormal [  ]  Reflexes: Symmetric [  ]  Abnormal [  ]    FUNCTIONAL STATUS:  Bed Mobility: Independent [  ]  Supervision [  ]  Needs Assistance [  ]  N/A [  ]  Transfers: Independent [  ]  Supervision [  ]  Needs Assistance [  ]  N/A [  ]   Ambulation: Independent [  ]  Supervision [  ]  Needs Assistance [  ]  N/A [  ]  ADL: Independent [  ] Requires Assistance [  ] N/A [  ]      LABS:                        11.0   16.91 )-----------( 164      ( 2019 11:32 )             33.4     -    139  |  106  |  15  ----------------------------<  91  3.6   |  24  |  0.7    Ca    9.0      2019 11:32  Mg     1.7     -    TPro  8.1<H>  /  Alb  3.7  /  TBili  0.8  /  DBili  x   /  AST  19  /  ALT  8   /  AlkPhos  85  07-28    PT/INR - ( 2019 21:09 )   PT: 15.50 sec;   INR: 1.35 ratio         PTT - ( 2019 21:09 )  PTT:30.0 sec  Urinalysis Basic - ( 2019 00:30 )    Color: Yellow / Appearance: Clear / S.021 / pH: x  Gluc: x / Ketone: Trace  / Bili: Negative / Urobili: <2 mg/dL   Blood: x / Protein: 30 mg/dL / Nitrite: Negative   Leuk Esterase: Negative / RBC: 28 /HPF / WBC 2 /HPF   Sq Epi: x / Non Sq Epi: 2 /HPF / Bacteria: Negative        RADIOLOGY & ADDITIONAL STUDIES:    Assesment:

## 2019-07-29 NOTE — PATIENT PROFILE ADULT - FUNCTIONAL SCREEN CURRENT LEVEL: SWALLOWING (IF SCORE 2 OR MORE FOR ANY ITEM, CONSULT REHAB SERVICES), MLM)
For information on Fall & Injury Prevention, visit [email or web address] = swallows foods/liquids without difficulty

## 2019-07-30 NOTE — CONSULT NOTE ADULT - SUBJECTIVE AND OBJECTIVE BOX
Patient is a 79y old  Male who presents with a chief complaint of fever (2019 17:46)      HPI:  80 yo M  PMH: HTN , COPD not on home o2 , currently smoker   CC: presented for fever and cough  History: Hx cannot be obtained because patient is non cooperative, not english speaking , and no family members were at bedside. Tried to contact the by phone , no response.   Called PMD lsited , Dr. Jordan states no history of patient in office  As per ED Documentation:  "78 y/o male with PMH of HTN, COPD current smoker not on home oxygen who presents to ED for 2 days of increased cough, congestion, fever and generalized weakness. No chest pain. No recent travel. No ill contacts." (2019 10:58)    Interval Events:  Patient seen and examined at bedside. Spoke with patient's wife and daughter in law. Patient is Indonesian-speaking/h/o dementia.  Per family, patient p/w cough of whitish-yellow sputum for 2 weeks, and fevers/generalized weakness of 2 days. Patient has had decreased PO/fluid intake lately. Family gave patient Motrin for subjective fevers on day of presentation, was not feeling better, so was taken to ED. In ED, patient received Cefepime, Vancomycin, and Azithromycin. He currently states that he "does not feel well". Describes nausea and chills, as well as cough. Denies fever/CP/SOB/abd pain/vomiting/diarrhea.     PAST MEDICAL & SURGICAL HISTORY:  HTN (hypertension)  Emphysematous COPD      Substance Use :  Tobacco Usage:  (   ) never smoked   (x  ) former smoker   (   ) current smoker  (68     ) pack year  (     few weeks ago   ) last tobacco use date  Alcohol Usage: Denies  Travel: Denies  Pets:          FAMILY HISTORY:  FHx: pneumonia      REVIEW OF SYSTEMS  General:	+Chills, no fevers    Skin:No rash  	  Ophthalmologic:Denies any visual complaints,discharge redness or photophobia  	  ENMT:No nasal discharge,headache,sinus congestion or throat pain.No dental complaints    Respiratory and Thorax:Productive cough    Cardiovascular:	No chest pain,palpitaions or dizziness    Gastrointestinal:	+ nausea, no abdominal pain or diarrhea.    Genitourinary:	No dysuria,frequency. No flank pain    Musculoskeletal:	No joint swelling or pain.No weakness    Neurological:No confusion,diziness.No extremity weakness.No bladder or bowel incontinence	    Psychiatric:No delusions or hallucinations	    Hematology/Lymphatics:	No LN swelling.No gum bleeding     Endocrine:	+ Weight loss lately, has not been eating as much. No abnormal heat/cold intolerance    Allergic/Immunologic:	No hives or rash     Allergies    No Known Allergies    Intolerances        Antimicrobials:        MEDICATIONS  (STANDING):  ALBUTerol/ipratropium for Nebulization 3 milliLiter(s) Nebulizer every 6 hours  chlorhexidine 4% Liquid 1 Application(s) Topical <User Schedule>  dextrose 5% + sodium chloride 0.45%. 1000 milliLiter(s) (75 mL/Hr) IV Continuous <Continuous>  heparin  Injectable 5000 Unit(s) SubCutaneous every 8 hours  tamsulosin 0.4 milliGRAM(s) Oral at bedtime    MEDICATIONS  (PRN):        Vital Signs Last 24 Hrs  T(C): 36.6 (2019 07:15), Max: 36.6 (2019 15:45)  T(F): 97.8 (2019 07:15), Max: 97.8 (2019 15:45)  HR: 116 (2019 07:15) (82 - 116)  BP: 109/53 (2019 07:15) (86/47 - 109/53)  BP(mean): --  RR: 18 (2019 07:15) (18 - 18)  SpO2: 98% (2019 05:15) (96% - 98%)    PHYSICAL EXAM:Pleasant patient in no acute distress.      Constitutional:Comfortable.Awake and alert  Very thin    Eyes:PERRL EOMI.NO discharge or conjunctival injection    ENMT:No sinus tenderness.No thrush. Yellowish sputum in back of pharynx    Neck:Supple,No LN,no JVD      Respiratory: bilateral rales, no wheezing/rhonchi    Cardiovascular:S1 S2 wnl, No murmurs,rub or gallops    Gastrointestinal:Soft BS(+) no tenderness no masses ,No rebound or guarding    Genitourinary:No CVA tendereness     Rectal:    Extremities:No cyanosis,clubbing or edema.    Vascular:peripheral pulses felt    Neurological:AAOx1-2    Skin:No rash     Lymph Nodes:No palpable LNs    Musculoskeletal:No joint swelling or LOM    Psychiatric:Affect normal.                                11.9   16.70 )-----------( 170      ( 2019 05:57 )             36.3     LIVER FUNCTIONS - ( 2019 21:09 )  Alb: 3.7 g/dL / Pro: 8.1 g/dL / ALK PHOS: 85 U/L / ALT: 8 U/L / AST: 19 U/L / GGT: x                 137  |  97<L>  |  12  ----------------------------<  123<H>  3.8   |  24  |  0.9    Ca    8.6      2019 05:57  Mg     1.9         TPro  8.1<H>  /  Alb  3.7  /  TBili  0.8  /  DBili  x   /  AST  19  /  ALT  8   /  AlkPhos  85            Urinalysis Basic - ( 2019 00:30 )    Color: Yellow / Appearance: Clear / S.021 / pH: x  Gluc: x / Ketone: Trace  / Bili: Negative / Urobili: <2 mg/dL   Blood: x / Protein: 30 mg/dL / Nitrite: Negative   Leuk Esterase: Negative / RBC: 28 /HPF / WBC 2 /HPF   Sq Epi: x / Non Sq Epi: 2 /HPF / Bacteria: Negative        RECENT CULTURES:   @ 00:30  .Urine Clean Catch (Midstream)  --  --  --    No growth  --   @ 21:09  .Blood Blood-Peripheral  --  --  --    No growth to date.  --      MICROBIOLOGY:  Culture Results:   No growth ( @ 00:30)  Culture Results:   No growth to date. ( @ 21:09)  Culture Results:   No growth to date. ( @ 21:09)        blood culture  --    No growth  blood culture gram stain  --  surgical site  --  culture urine  --  culture tissue  --      blood culture  --    No growth to date.  blood culture gram stain  --  surgical site  --  culture urine  --  culture tissue  --        Radiology:  < from: CT Angio Chest w/ IV Cont (19 @ 01:01) >  IMPRESSION:     Since 2015:    No acute pulmonary embolus.    Patchy nodularity in the right lower lobe, limited by respiratory motion   which may reflect tree-in-bud nodules versus alveolar opacities and for   which differential includes airway infection such as bronchitis versus   pneumonia.    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        < end of copied text >    < from: Xray Chest 1 View-PORTABLE IMMEDIATE (19 @ 22:42) >    Impression:      No focalconsolidation, pleural effusion or pneumothorax. Stable findings   of interstitial lung disease.      < end of copied text >

## 2019-07-30 NOTE — SWALLOW BEDSIDE ASSESSMENT ADULT - SLP PERTINENT HISTORY OF CURRENT PROBLEM
Pt adm w/ cough, congestion and fevers x2 days. PMHx: HTN, COPD not on home O2, current smoker. CXR - interstitial lung disease Pt adm w/ cough, congestion and fevers x2 days. PMHx: HTN, COPD not on home O2, current smoker. CXR - interstitial lung disease. No hx dysphagia, however pt's daughter in law reported occasional coughing after eating at home.

## 2019-07-30 NOTE — SWALLOW BEDSIDE ASSESSMENT ADULT - COMMENTS
Pt Evelyn speaking. pt's daughter in law and son assisted w/ translation for evaluation. +toleration w/o overt s/s penetration/aspiration w/ puree +toleration w/o overt s/s penetration/aspiration w/ soft solids.

## 2019-07-30 NOTE — CHART NOTE - NSCHARTNOTEFT_GEN_A_CORE
Was called by nurse on patient for sbp=86 and IQ=568.    Patient was seen and examined.     Repeat BP=95/50. Afebrile.    EKG done and showed sinus tachycardia.     Urine output overnight is >1mg/kg/hour.     In the setting of good urine output, well appearing patient, suspicion for septic shock is very low.     Will refrain from giving bolus of fluids for now as blood pressure is better on 2nd reading and UOP is great.     Will monitor BP closely.

## 2019-07-30 NOTE — CONSULT NOTE ADULT - ASSESSMENT
78 yo M with PMHx of HTN, COPD not on Home O2, last seen by PMD over 5 years ago and not on any home medications as per son, presents with complaint of productive cough of whitish sputum for 2 weeks duration associated with fevers and generalized weakness as of yesterday. Patient resides with his wife, son and daughter in law. As per son, patient was previously independent with all activities of daily living and ambulates unassisted, however in the past few day has been unable to walk secondary to weakness and exhibiting decreased PO intake. Son also endorses history of difficulty swallowing foods, although denies any episodes of choking. No other complaints noted, patient has not had any sick contacts or recent travel.    #Sepsis secondary to RLL Pneumonia vs. Bronchitis vs. Aspiration Pneumonia  -Received Cefepime, Vancomycin and Azithromycin in ED  -F/U sputum culture  - BCx/UCx NGTD  -F/U speech and swallow assessment for need of modified diet, NPO with maintenance fluid in interim   -Pulmonary toilet  -Incentive Spirometry  -Nebs Q6H and PRN 80 yo M with PMHx of HTN, COPD not on Home O2, last seen by PMD over 5 years ago and not on any home medications as per son, presents with complaint of productive cough of whitish sputum for 2 weeks duration associated with fevers and generalized weakness as of yesterday. Patient resides with his wife, son and daughter in law. As per son, patient was previously independent with all activities of daily living and ambulates unassisted, however in the past few day has been unable to walk secondary to weakness and exhibiting decreased PO intake. Son also endorses history of difficulty swallowing foods, although denies any episodes of choking. No other complaints noted, patient has not had any sick contacts or recent travel.    #Sepsis secondary to aspiration pneumonia with possible bacterial component   -Received Cefepime, Vancomycin and Azithromycin in ED  -F/U sputum culture  - BCx/UCx NGTD  -F/U speech and swallow assessment for need of modified diet, NPO with maintenance fluid in interim   -Pulmonary toilet  -Incentive Spirometry  -Nebs Q6H and PRN  - Unasyn 3g q6h 80 yo M with PMHx of HTN, COPD not on Home O2, last seen by PMD over 5 years ago and not on any home medications as per son, presents with complaint of productive cough of whitish sputum for 2 weeks duration associated with fevers and generalized weakness as of yesterday. Patient resides with his wife, son and daughter in law. As per son, patient was previously independent with all activities of daily living and ambulates unassisted, however in the past few day has been unable to walk secondary to weakness and exhibiting decreased PO intake. Son also endorses history of difficulty swallowing foods, although denies any episodes of choking. No other complaints noted, patient has not had any sick contacts or recent travel.    IMPRESSION:  Bibasilar GNR PNA secondary to aspiration    RECOMMENDATIONS:  Unasyn 3 gm iv q6h  No further diagnostics from ID standpoint

## 2019-07-30 NOTE — SWALLOW BEDSIDE ASSESSMENT ADULT - SLP GENERAL OBSERVATIONS
Pt received sitting upright in bed, alert and oriented x2 w/ no c/o pain. +room air. Pt's family at the bedside.

## 2019-07-30 NOTE — PHYSICAL THERAPY INITIAL EVALUATION ADULT - PLANNED THERAPY INTERVENTIONS, PT EVAL
balance training/strengthening/neuromuscular re-education/ROM/gait training/transfer training/bed mobility training

## 2019-07-31 NOTE — DIETITIAN INITIAL EVALUATION ADULT. - ENERGY NEEDS
Calories: 5171-8474 kcal/day (MSJ x 1.2-1.3 AF)  Protein: 48-58 gm/day (1-1.2 gm/kg CBW)  Fluid: 1 ml/kcal

## 2019-07-31 NOTE — DIETITIAN INITIAL EVALUATION ADULT. - OTHER INFO
Nutrition Hx PTA: Pt was sleeping at time of RD visit, history obtained from son. Per son, pt has a great appetite/po intake and consumes 3 meals daily. Denies use of oral nutrition supplements and has NKFA.     Pt admitted for fevers rule out community acquired pneumonia vs. aspiration. The patient was hypotensive overnight.  The patient may have been slightly volume contracted; continue supportive care with NS @ 100cc/hr.  Monitor for persistent hypotension.

## 2019-07-31 NOTE — DIETITIAN INITIAL EVALUATION ADULT. - PHYSICAL APPEARANCE
underweight/sleeping. BMI: 17.0, IBW: 142#, unknown UBW, however son denies any recent wt loss, states his dad has always been skinny all of his life. no edema noted, stage 1 to coccyx. pt doesn't display any physical signs of malnutrition.

## 2019-07-31 NOTE — DIETITIAN INITIAL EVALUATION ADULT. - FEEDING SKILL
independent/Per pt's son at b/s, pt has a great appetite and is consuming most of his meal trays w/o any issues as well as consuming meals brought from home.

## 2019-07-31 NOTE — DIETITIAN INITIAL EVALUATION ADULT. - FACTORS AFF FOOD INTAKE
per SLP recs on 7/30--Dysphagia 2 (soft, ground) w/ Thin liquids, at home per son pt eats reg + thins. LBM 7/29/other (specify)

## 2019-07-31 NOTE — DIETITIAN INITIAL EVALUATION ADULT. - NAME AND PHONE
Pt to maintain >75% of meals and snacks over the next 7 days. RD to monitor energy intake, nutrition focused physical findings

## 2019-08-01 PROBLEM — J43.9 EMPHYSEMA, UNSPECIFIED: Chronic | Status: ACTIVE | Noted: 2019-01-01

## 2019-08-01 NOTE — PROGRESS NOTE ADULT - SUBJECTIVE AND OBJECTIVE BOX
KEN RAMIREZ [id number]    Hospitalist Note  80yo M with Past Medical History HTN and COPD (off supplemental oxygen) admitted for fevers rule out community acquired pneumonia vs. aspiration.  Communication between the staff and patient has been difficult to language barrier; the patient is [place], and we attempted to use the , however, according to his family, the patient speaks a rare dialect.    Overnight events/Updates: His family reports less coughing and improved strength since admission.  Speech and swallow evaluated the patient and recommended a dysphagia 2 with thin liquids.    Vital Signs Last 24 Hrs  T(C): 37.2 (31 Jul 2019 07:22), Max: 37.6 (30 Jul 2019 15:30)  T(F): 99 (31 Jul 2019 07:22), Max: 99.6 (30 Jul 2019 15:30)  HR: 110 (31 Jul 2019 07:22) (106 - 126)  BP: 112/61 (31 Jul 2019 07:22) (83/53 - 114/56)  BP(mean): --  RR: 18 (31 Jul 2019 07:22) (18 - 18)  SpO2: 95% (31 Jul 2019 02:00) (95% - 95%)    Physical Examination:  General: AAO x 3; [place] speaking  HEENT: PERRLA, EOMI  CV= S1 & S2 appreciated  Lungs= + non productive cough without wheezes  Abdominal Examination= + BS, Soft NT/ND  Extremity Examination= No C/C/E    ROS: No chest pain, no shortness of breath.  All other systems reviewed and are within normal limits except for the complaints in the HPI.    MEDICATIONS  (STANDING):  ALBUTerol/ipratropium for Nebulization 3 milliLiter(s) Nebulizer every 6 hours  ampicillin/sulbactam  IVPB 3 Gram(s) IV Intermittent every 6 hours  chlorhexidine 4% Liquid 1 Application(s) Topical <User Schedule>  heparin  Injectable 5000 Unit(s) SubCutaneous every 8 hours  sodium chloride 0.9%. 1000 milliLiter(s) (100 mL/Hr) IV Continuous <Continuous>  tamsulosin 0.4 milliGRAM(s) Oral at bedtime    MEDICATIONS  (PRN):                            11.3   9.84  )-----------( 144      ( 31 Jul 2019 07:03 )             34.1     07-31    143  |  104  |  9<L>  ----------------------------<  89  3.5   |  27  |  0.9    Ca    8.5      31 Jul 2019 07:03  Mg     1.9     07-30    TPro  6.7  /  Alb  3.0<L>  /  TBili  0.5  /  DBili  x   /  AST  31  /  ALT  15  /  AlkPhos  68  07-31      Case discussed with housestaff & family  MISAEL Gómez [id number]
HPI  78 yo M with PMHx of HTN, COPD not on Home O2, last seen by PMD over 5 years ago and not on any home medications as per son, presents with complaint of productive cough of whitish sputum for 2 weeks duration associated with fevers and generalized weakness as of yesterday. Patient resides with his wife, son and daughter in law. As per son, patient was previously independent with all activities of daily living and ambulates unassisted, however in the past few day has been unable to walk secondary to weakness and exhibiting decreased PO intake. Son also endorses history of difficulty swallowing foods, although denies any episodes of choking. No other complaints noted, patient has not had any sick contacts or recent travel.    Currently admitted to medicine with the primary diagnosis of Sepsis     Today is hospital day 2d.     INTERVAL HPI / OVERNIGHT EVENTS:  Patient was examined and seen at bedside. Attempted to use  phone, but patient speaks a different dialect of Urdu, so translation had to be provided through son at bedside.  This morning he is resting comfortably in bed and reports no new issues or overnight events. He denies any fever or chills.    ROS: Otherwise unremarkable     PAST MEDICAL & SURGICAL HISTORY  HTN (hypertension)  Emphysematous COPD    ALLERGIES  No Known Allergies    MEDICATIONS  STANDING MEDICATIONS  ALBUTerol/ipratropium for Nebulization 3 milliLiter(s) Nebulizer every 6 hours  ampicillin/sulbactam  IVPB 3 Gram(s) IV Intermittent every 6 hours  chlorhexidine 4% Liquid 1 Application(s) Topical <User Schedule>  heparin  Injectable 5000 Unit(s) SubCutaneous every 8 hours  sodium chloride 0.9%. 1000 milliLiter(s) IV Continuous <Continuous>  tamsulosin 0.4 milliGRAM(s) Oral at bedtime    PRN MEDICATIONS    VITALS:  T(F): 99  HR: 110  BP: 112/61  RR: 18  SpO2: 95%    PHYSICAL EXAM  GEN: NAD, Resting comfortably in bed  PULM: bibasilar crackles on auscultation  CVS: Regular rate and rhythm, S1-S2, no murmurs  ABD: Soft, non-tender, non-distended, no guarding  EXT: No edema  NEURO: AAOx2 (person and place), no focal deficits    LABS                        11.3   9.84  )-----------( 144      ( 31 Jul 2019 07:03 )             34.1     07-31    143  |  104  |  9<L>  ----------------------------<  89  3.5   |  27  |  0.9    Ca    8.5      31 Jul 2019 07:03  Mg     1.9     07-30    TPro  6.7  /  Alb  3.0<L>  /  TBili  0.5  /  DBili  x   /  AST  31  /  ALT  15  /  AlkPhos  68  07-31        ABG - ( 30 Jul 2019 14:40 )  pH, Arterial: 7.49  pH, Blood: x     /  pCO2: 35    /  pO2: 87    / HCO3: 27    / Base Excess: 3.8   /  SaO2: 98              Culture - Urine (collected 29 Jul 2019 00:30)  Source: .Urine Clean Catch (Midstream)  Final Report (30 Jul 2019 07:20):    No growth    Culture - Blood (collected 28 Jul 2019 21:09)  Source: .Blood Blood-Peripheral  Preliminary Report (30 Jul 2019 04:01):    No growth to date.    Culture - Blood (collected 28 Jul 2019 21:09)  Source: .Blood Blood-Peripheral  Preliminary Report (30 Jul 2019 04:01):    No growth to date.
HPI  80 yo M with PMHx of HTN, COPD not on Home O2, last seen by PMD over 5 years ago and not on any home medications as per son, presents with complaint of productive cough of whitish sputum for 2 weeks duration associated with fevers and generalized weakness as of yesterday.    Currently admitted to medicine with the primary diagnosis of Sepsis     Today is hospital day 1d.     INTERVAL HPI / OVERNIGHT EVENTS:  Patient was examined and seen at bedside. This morning he is resting comfortably in bed and reports no new issues or overnight events. Son at bedside provided translation (Lizbet [phone]). Patient had fever 3-4 days ago, in addition to cough lasting 2 weeks, productive with whitish sputum and was brought to ED by son. Overnight, patient had hypotensive episode with SBP of 86 with , on repeat was 95/50 with good urine output, so no intervention was performed as likelihood of septic shock deemed low. On evaluation, patient is resting comfortably in bed, is alert and oriented to person and place but not time. per son, patient has not seen a doctor in 5 years, and has not taken any medications in about 1 year as he has not felt the need to take them, but states he was on flomax previously and an anxiolytic but does not recall the name of the medication.  ROS: Otherwise unremarkable    PAST MEDICAL & SURGICAL HISTORY  HTN (hypertension)  Emphysematous COPD    ALLERGIES  No Known Allergies    MEDICATIONS  STANDING MEDICATIONS  ALBUTerol/ipratropium for Nebulization 3 milliLiter(s) Nebulizer every 6 hours  ampicillin/sulbactam  IVPB 3 Gram(s) IV Intermittent every 6 hours  chlorhexidine 4% Liquid 1 Application(s) Topical <User Schedule>  dextrose 5% + sodium chloride 0.45%. 1000 milliLiter(s) IV Continuous <Continuous>  heparin  Injectable 5000 Unit(s) SubCutaneous every 8 hours  tamsulosin 0.4 milliGRAM(s) Oral at bedtime    PRN MEDICATIONS    VITALS:  T(F): 99.6  HR: 126  BP: 114/56  RR: 18  SpO2: 98%    PHYSICAL EXAM  GEN: NAD, Resting comfortably in bed  PULM: Crackles hear at R lung base  CVS: Regular rate and rhythm, S1-S2, no murmurs  ABD: Soft, non-tender, non-distended, no guarding  EXT: No edema  NEURO: AAOx3, no focal deficits    LABS                        11.9   16.70 )-----------( 170      ( 2019 05:57 )             36.3     07-30    137  |  97<L>  |  12  ----------------------------<  123<H>  3.8   |  24  |  0.9    Ca    8.6      2019 05:57  Mg     1.9         TPro  8.1<H>  /  Alb  3.7  /  TBili  0.8  /  DBili  x   /  AST  19  /  ALT  8   /  AlkPhos  85      PT/INR - ( 2019 21:09 )   PT: 15.50 sec;   INR: 1.35 ratio         PTT - ( 2019 21:09 )  PTT:30.0 sec  Urinalysis Basic - ( 2019 00:30 )    Color: Yellow / Appearance: Clear / S.021 / pH: x  Gluc: x / Ketone: Trace  / Bili: Negative / Urobili: <2 mg/dL   Blood: x / Protein: 30 mg/dL / Nitrite: Negative   Leuk Esterase: Negative / RBC: 28 /HPF / WBC 2 /HPF   Sq Epi: x / Non Sq Epi: 2 /HPF / Bacteria: Negative      ABG - ( 2019 14:40 )  pH, Arterial: 7.49  pH, Blood: x     /  pCO2: 35    /  pO2: 87    / HCO3: 27    / Base Excess: 3.8   /  SaO2: 98                    Culture - Urine (collected 2019 00:30)  Source: .Urine Clean Catch (Midstream)  Final Report (2019 07:20):    No growth    Culture - Blood (collected 2019 21:09)  Source: .Blood Blood-Peripheral  Preliminary Report (2019 04:01):    No growth to date.    Culture - Blood (collected 2019 21:09)  Source: .Blood Blood-Peripheral  Preliminary Report (2019 04:01):    No growth to date.          RADIOLOGY    < from: Xray Chest 1 View-PORTABLE IMMEDIATE (19 @ 22:42) >  EXAM:  XR CHEST PORTABLE IMMED 1V            PROCEDURE DATE:  2019            INTERPRETATION:  Clinical History / Reason for exam: Sepsis    Comparison : Chest radiograph 2016.    Technique/Positioning: AP Portable    Findings:    Support devices: Telemetry leads.    Cardiac/mediastinum/hilum: Stable.    Lung parenchyma/Pleura: There is no focal consolidation, pleural effusion   or pneumothorax. Stable reticular opacities.    Skeleton/soft tissues: Stable    Impression:      No focalconsolidation, pleural effusion or pneumothorax. Stable findings   of interstitial lung disease.    < end of copied text >    < from: CT Angio Chest w/ IV Cont (19 @ 01:01) >  EXAM:  CT ANGIO CHEST (W)AW IC            PROCEDURE DATE:  2019            INTERPRETATION:  CLINICAL HISTORY/REASON FOR EXAM: Shortness of breath,   tachycardia, hypoxia. Rule out pulmonary embolus.    TECHNIQUE: Contiguous CTA images were obtained from the thoracic inlet to   the upper abdomen, following the administration of 80 cc intravenous   contrast. Coronal, sagittal and 3-D/MIP reformats were generated    COMPARISON: CT chest with contrast 2015.    FINDINGS:    PULMONARY ARTERIES: No pulmonary embolus.    LUNGS, PLEURA AND AIRWAYS: There is centrilobular emphysema.   Redemonstrated are findings consistent with UIP pattern of interstitial   lung disease as demonstrated by subpleural reticulation and honeycombing.   There are faint small patchy nodular opacities in the right lower lobe   which may be peribronchiolar/tree-in-bud versus alveolar, limited   secondary to respiratory motion. No anahi consolidation, pleural effusion   or pneumothorax. No evidence for central endobronchial obstructing lesion.    HEART AND VESSELS: Heart size is within normal limits. No pericardial   effusion. The ascending aorta and main pulmonary artery normal in caliber.    THORACIC INLET/MEDIASTINUM/LYMPH NODES: 9 mm short axis subcarinal and   left infrahilar lymph node are stable from remote prior examination.   Paratracheal and prevascular mediastinal lymph nodes have decreased in   size and are subcentimeter. No enlarged axillary lymph nodes.    UPPER ABDOMEN: Limited evaluation of the upper abdomen demonstrates no   acute abnormality.    BONES AND SOFT TISSUES: Stable compression deformities of T9 and T10   vertebral bodies. Degenerative changes of the spine.    IMPRESSION:     Since 2015:    No acute pulmonary embolus.    Patchy nodularity in the right lower lobe, limited by respiratory motion   which may reflect tree-in-bud nodules versus alveolar opacities and for   which differential includes airway infection such as bronchitis versus   pneumonia.    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    < end of copied text >
KEN RAMIREZ  79y, Male      OVERNIGHT EVENTS:    NAD    VITALS:  T(F): 99, Max: 99.6 (07-30-19 @ 15:30)  HR: 110  BP: 112/61  RR: 18Vital Signs Last 24 Hrs  T(C): 37.2 (31 Jul 2019 07:22), Max: 37.6 (30 Jul 2019 15:30)  T(F): 99 (31 Jul 2019 07:22), Max: 99.6 (30 Jul 2019 15:30)  HR: 110 (31 Jul 2019 07:22) (106 - 126)  BP: 112/61 (31 Jul 2019 07:22) (83/53 - 114/56)  BP(mean): --  RR: 18 (31 Jul 2019 07:22) (18 - 18)  SpO2: 95% (31 Jul 2019 02:00) (95% - 95%)    TESTS & MEASUREMENTS:                        11.3   9.84  )-----------( 144      ( 31 Jul 2019 07:03 )             34.1     07-31    143  |  104  |  9<L>  ----------------------------<  89  3.5   |  27  |  0.9    Ca    8.5      31 Jul 2019 07:03  Mg     1.9     07-30    TPro  6.7  /  Alb  3.0<L>  /  TBili  0.5  /  DBili  x   /  AST  31  /  ALT  15  /  AlkPhos  68  07-31    LIVER FUNCTIONS - ( 31 Jul 2019 07:03 )  Alb: 3.0 g/dL / Pro: 6.7 g/dL / ALK PHOS: 68 U/L / ALT: 15 U/L / AST: 31 U/L / GGT: x             Culture - Urine (collected 07-29-19 @ 00:30)  Source: .Urine Clean Catch (Midstream)  Final Report (07-30-19 @ 07:20):    No growth    Culture - Blood (collected 07-28-19 @ 21:09)  Source: .Blood Blood-Peripheral  Preliminary Report (07-30-19 @ 04:01):    No growth to date.    Culture - Blood (collected 07-28-19 @ 21:09)  Source: .Blood Blood-Peripheral  Preliminary Report (07-30-19 @ 04:01):    No growth to date.            RADIOLOGY & ADDITIONAL TESTS:    ANTIBIOTICS:  ampicillin/sulbactam  IVPB 3 Gram(s) IV Intermittent every 6 hours
KEN RAMIREZ  79y, Male      OVERNIGHT EVENTS:    no fevers    VITALS:  T(F): 96.6, Max: 99.1 (08-01-19 @ 00:00)  HR: 98  BP: 112/62  RR: 18Vital Signs Last 24 Hrs  T(C): 35.9 (01 Aug 2019 07:28), Max: 37.3 (01 Aug 2019 00:00)  T(F): 96.6 (01 Aug 2019 07:28), Max: 99.1 (01 Aug 2019 00:00)  HR: 98 (01 Aug 2019 10:29) (98 - 121)  BP: 112/62 (01 Aug 2019 10:29) (96/51 - 119/53)  BP(mean): --  RR: 18 (01 Aug 2019 07:28) (18 - 18)  SpO2: --    TESTS & MEASUREMENTS:                        10.0   8.79  )-----------( 147      ( 01 Aug 2019 06:09 )             30.6     08-01    141  |  105  |  6<L>  ----------------------------<  96  4.1   |  24  |  0.7    Ca    8.2<L>      01 Aug 2019 06:09    TPro  6.7  /  Alb  3.0<L>  /  TBili  0.5  /  DBili  x   /  AST  31  /  ALT  15  /  AlkPhos  68  07-31    LIVER FUNCTIONS - ( 31 Jul 2019 07:03 )  Alb: 3.0 g/dL / Pro: 6.7 g/dL / ALK PHOS: 68 U/L / ALT: 15 U/L / AST: 31 U/L / GGT: x             Culture - Urine (collected 07-29-19 @ 00:30)  Source: .Urine Clean Catch (Midstream)  Final Report (07-30-19 @ 07:20):    No growth    Culture - Blood (collected 07-28-19 @ 21:09)  Source: .Blood Blood-Peripheral  Preliminary Report (07-30-19 @ 04:01):    No growth to date.    Culture - Blood (collected 07-28-19 @ 21:09)  Source: .Blood Blood-Peripheral  Preliminary Report (07-30-19 @ 04:01):    No growth to date.            RADIOLOGY & ADDITIONAL TESTS:    ANTIBIOTICS:  amoxicillin  875 milliGRAM(s)/clavulanate 1 Tablet(s) Oral every 12 hours
KEN RAMIREZ [id number]    Hospitalist Note  80yo M with Past Medical History HTN and COPD (off supplemental oxygen) admitted for fevers rule out community acquired pneumonia vs. aspiration.  Communication between the staff and patient has been difficult to language barrier; the patient is [place], and we attempted to use the , however, according to his family, the patient speaks a rare dialect.    Overnight events/Updates:  The patient reports improvement in his dyspnea though family reports significant deconditioning from baseline.  Rehab was willing to accept the patient.    Vital Signs Last 24 Hrs  T(C): 35.9 (01 Aug 2019 07:28), Max: 37.3 (01 Aug 2019 00:00)  T(F): 96.6 (01 Aug 2019 07:28), Max: 99.1 (01 Aug 2019 00:00)  HR: 98 (01 Aug 2019 10:29) (98 - 121)  BP: 112/62 (01 Aug 2019 10:29) (96/51 - 119/53)  BP(mean): --  RR: 18 (01 Aug 2019 07:28) (18 - 18)  SpO2: --    Physical Examination:  General: AAO x 3  HEENT: PERRLA, EOMI  CV= S1 & S2 appreciated  Lungs= No wheezes or rales  Abdominal Examination= + BS, Soft, NT/ND  Extremity Examination= limited ambulation, No C/C    ROS: No chest pain, no shortness of breath.  All other systems reviewed and are within normal limits except for the complaints in the HPI.    MEDICATIONS  (STANDING):  ALBUTerol/ipratropium for Nebulization 3 milliLiter(s) Nebulizer every 6 hours  amoxicillin  875 milliGRAM(s)/clavulanate 1 Tablet(s) Oral every 12 hours  chlorhexidine 4% Liquid 1 Application(s) Topical <User Schedule>  heparin  Injectable 5000 Unit(s) SubCutaneous every 8 hours  sodium chloride 0.9%. 1000 milliLiter(s) (100 mL/Hr) IV Continuous <Continuous>  tamsulosin 0.4 milliGRAM(s) Oral at bedtime    MEDICATIONS  (PRN):                            10.0   8.79  )-----------( 147      ( 01 Aug 2019 06:09 )             30.6     08-01    141  |  105  |  6<L>  ----------------------------<  96  4.1   |  24  |  0.7    Ca    8.2<L>      01 Aug 2019 06:09    TPro  6.7  /  Alb  3.0<L>  /  TBili  0.5  /  DBili  x   /  AST  31  /  ALT  15  /  AlkPhos  68  07-31      Case discussed with housestaff & family  MISAEL Gómez [phone]

## 2019-08-01 NOTE — DISCHARGE NOTE PROVIDER - CARE PROVIDER_API CALL
Derek Jordan (MD)  Medicine  [address] [place] [address]  Phone: [phone]  Fax: [phone]  Follow Up Time:

## 2019-08-01 NOTE — DISCHARGE NOTE PROVIDER - NSDCCPCAREPLAN_GEN_ALL_CORE_FT
PRINCIPAL DISCHARGE DIAGNOSIS  Diagnosis: Sepsis  Assessment and Plan of Treatment: Sepsis on initial arrival to the ED, likely as a result of aspiration pneumonia, which was treated with continued antibiotics, to continue taking augmentin 875 mg PO q12h for 5 days.      SECONDARY DISCHARGE DIAGNOSES  Diagnosis: Pneumonia  Assessment and Plan of Treatment: Sepsis on initial arrival to the ED, likely as a result of aspiration pneumonia, which was treated with continued antibiotics, to continue taking augmentin 875 mg PO q12h for 5 days.

## 2019-08-01 NOTE — PROVIDER CONTACT NOTE (OTHER) - ACTION/TREATMENT ORDERED:
EKG ordered. As per MD no need for increase of IV fluids
MD Acosta notified and said this is pts normal during night time and to continue to monitor BP reassessed remains 96/51 and pulse 106
500cc Bolus administered. VS stable s/p bolus -- Bp 94/53  MD Acosta aware.

## 2019-08-01 NOTE — PROGRESS NOTE ADULT - ASSESSMENT
80yo M with Past Medical History HTN and COPD (off supplemental oxygen) admitted for fevers rule out community acquired pneumonia vs. aspiration.      Dyspnea secondary to community acquired pneumonia vs. aspiration: the patient was hypotensive overnight.  The patient may have been slightly volume contracted; continue supportive care with NS @ 100cc/hr.  Monitor for persistent hypotension.  Tylenol PRN for fevers.  Adjust diet based on speech and swallow recommendations.  Infectious Disease recommendations were reviewed; continue IV Unasyn for an additional 24 hours and then switch to Augmentin 875mg PO q12 in AM.  COPD: no active wheezing appreciated.  Abuterol/Atrovent PRN.  BPH: continue Flomax 0,4mg PO q24  GI/DVT prophylaxis    #Progress Note Handoff    Pending:  Other: Monitor for persistent fevers.  WBC are within normal limits    Future Disposition: Home with family
78 yo M  PMH: HTN, COPD not on home o2 , currently smoker   CC: presented for fever and cough  In ED: Sepsis code was called. Patient was given IVF's and Vanco/Zosyn. Cultures pending. CT showed PNA.    ASSESSMENT / PLAN:    # Gram negative PNA:  - ABX: Azithromycin and Ceftriaxone  - Cultures: Urine and Blood, f/u  - MRSA negative  - RVP pending  - Urine step and legionella  - CT indicative of PNA like picture  - UA negative  - ID consult appreciated: recommend unasyn 3g IV q6h, change to po augmentin 875mg q12h starting on 8/1 for 5 days    # urinary Incontinence:  - Martinez inserted by ED, switch to [place] cath  - PT Rehab evaluation appreciated: recommends rehabilitation facility; home w/ home PT; Home PT with aid VS STR  - OOB to chair     # HTN: Monitor BP. If needed begin ACE inhibitor    # COPD:  - Duonebs prn  - Maintain Saturation > 92%  - Supplemental oxygen as needed    # OTHERS:  dvt ppx  pg ppx not indicated  chg daily and prn for incontinence   Full code
78yo M with Past Medical History HTN and COPD (off supplemental oxygen) admitted for fevers rule out community acquired pneumonia vs. aspiration.      Dyspnea secondary to community acquired pneumonia vs. aspiration: blood pressure has recovered over the past 24 hours with gentle IV hydration.  IVF was discontinued.  Tylenol PRN for fevers.  Continue dysphagia diet due to poor dentition.  Switch IV Unasyn to oral Augmentin 875mg PO q12 in AM upon discharge  COPD: no active wheezing appreciated.  Abuterol/Atrovent PRN.  BPH: continue Flomax 0,4mg PO q24  GI/DVT prophylaxis    #Progress Note Handoff    Pending:  Future Disposition: Discharge to inpatient rehab; time spent = 35 minutes
80 yo M  PMH: HTN, COPD not on home o2 , currently smoker   CC: presented for fever and cough  In ED: Sepsis code was called. Patient was given IVF's and Vanco/Zosyn. Cultures pending. CT showed PNA.    ASSESSMENT / PLAN:    # Gram negative PNA:  - ABX: Azithromycin and Ceftriaxone  - Cultures: Urine and Blood, f/u  - MRSA negative  - RVP pending  - Urine step and legionella  - CT indicative of PNA like picture  - UA negative  - ID consult appreciated: recommend unasyn    # urinary Incontinence:  - Martinez inserted by ED, switch to [place] cath  - PT Rehab evaluation appreciated: recommends rehabilitation facility; home w/ home PT; Home PT with aid VS STR  - OOB to chair     # HTN: Monitor BP. If needed begin ACE inhibitor    # COPD:  - Duonebs prn  - Maintain Saturation > 92%  - Supplemental oxygen as needed    # OTHERS:  dvt ppx  pg ppx not indicated  chg daily and prn for incontinence   Full code
· Assessment		  80 yo M with PMHx of HTN, COPD not on Home O2, last seen by PMD over 5 years ago and not on any home medications as per son, presents with complaint of productive cough of whitish sputum for 2 weeks duration associated with fevers and generalized weakness as of yesterday. Patient resides with his wife, son and daughter in law. As per son, patient was previously independent with all activities of daily living and ambulates unassisted, however in the past few day has been unable to walk secondary to weakness and exhibiting decreased PO intake. Son also endorses history of difficulty swallowing foods, although denies any episodes of choking. No other complaints noted, patient has not had any sick contacts or recent travel.    IMPRESSION:  Bibasilar GNR PNA secondary to aspiration  WBC 16.9 > 9.8    RECOMMENDATIONS:  Unasyn 3 gm iv q6h  Po Augmentin 875 mg q12h for 5 more days  recall prn please
· Assessment		  80 yo M with PMHx of HTN, COPD not on Home O2, last seen by PMD over 5 years ago and not on any home medications as per son, presents with complaint of productive cough of whitish sputum for 2 weeks duration associated with fevers and generalized weakness as of yesterday. Patient resides with his wife, son and daughter in law. As per son, patient was previously independent with all activities of daily living and ambulates unassisted, however in the past few day has been unable to walk secondary to weakness and exhibiting decreased PO intake. Son also endorses history of difficulty swallowing foods, although denies any episodes of choking. No other complaints noted, patient has not had any sick contacts or recent travel.    IMPRESSION:  Bibasilar GNR PNA secondary to aspiration  WBC 16.9 > 9.8    RECOMMENDATIONS:  Unasyn 3 gm iv q6h  Will change to po Augmentin 875 mg q12h on 8/1 for 5 more days

## 2019-08-01 NOTE — DISCHARGE NOTE NURSING/CASE MANAGEMENT/SOCIAL WORK - NSDCDPATPORTLINK_GEN_ALL_CORE
You can access the [facility] Patient Portal, offered by [facility], by registering with the following website: [email or web address]

## 2019-08-01 NOTE — DISCHARGE NOTE PROVIDER - HOSPITAL COURSE
80yo M with Past Medical History HTN and COPD (off supplemental oxygen), last saw PMD 5 years prior to presentation and last took any medication 1 year prior to presentation, who was admitted for fever and cough, with suspicion of community acquired pneumonia vs. aspiration. His examination was positive for bibasilar crackles on auscultation, with chest CT positive for Patchy nodularity in the right lower lobe, limited by respiratory motion	suggestive of airway infection such as bronchitis versus pneumonia. He received azithromycin and ceftriaxone for treatment of suspected community acquired pneumonia, then received unasyn IV with notable improvement. He has had hypotensive episodes overnight each night which have resolved spontaneously or for which he received a 500mL bolus of NS. He and his family are aware that he is to be discharged on augmentin 875 mg PO q12h for 5 days.

## 2019-08-01 NOTE — PROVIDER CONTACT NOTE (OTHER) - SITUATION
Bp 86/47  2nd read 95/50  MD Acosta aware
Pt BP was 95/51 Pulse of 121.
BP 83/53  MD Karla alejandra

## 2019-08-06 NOTE — DISCHARGE NOTE PROVIDER - PROVIDER TOKENS
PROVIDER:[TOKEN:[[id number]]],PROVIDER:[TOKEN:[[id number]]] PROVIDER:[TOKEN:[[id number]]],PROVIDER:[TOKEN:[[id number]]],PROVIDER:[TOKEN:[[id number]]] PROVIDER:[TOKEN:[[id number]],FOLLOWUP:[2 weeks]],PROVIDER:[TOKEN:[[id number]],FOLLOWUP:[2 weeks]],PROVIDER:[TOKEN:[[id number]],FOLLOWUP:[2 weeks]]

## 2019-08-06 NOTE — DISCHARGE NOTE PROVIDER - CARE PROVIDER_API CALL
Derek Jordan)  Medicine  [address] [place] [address]  Phone: [phone]  Fax: [phone]  Follow Up Time:     Grayson Somers)  Neurology  [address] [place] [address], [address]  [place] [address]  Phone: [phone]  Fax: [phone]  Follow Up Time: Derek Jordan)  Medicine  [address] [place] [address]  Phone: [phone]  Fax: [phone]  Follow Up Time:     Grayson Somers)  Neurology  [address] [facility], [address]  [place] [address]  Phone: [phone]  Fax: [phone]  Follow Up Time:     Bola Pruett)  Infectious Disease; Internal Medicine  [address] [place] [address]  Phone: [phone]  Fax: [phone]  Follow Up Time: Derek Jordan)  Medicine  [address] [place] [address]  Phone: [phone]  Fax: [phone]  Follow Up Time: 2 weeks    Grayson Somers)  Neurology  [address] [place] [address], [address]  [place] [address]  Phone: [phone]  Fax: [phone]  Follow Up Time: 2 weeks    Bola Pruett)  Infectious Disease; Internal Medicine  [facility] [place] [address]  Phone: [phone]  Fax: [phone]  Follow Up Time: 2 weeks

## 2019-08-06 NOTE — DISCHARGE NOTE PROVIDER - INSTRUCTIONS
Dysphagia 1 Pureed-Thin liquids  DASH/ TLC (sodium and cholesterol restricted) Dysphagia 1 Pureed-Thin liquids. Three swallows before next mouthful/ bite.  DASH/ TLC (sodium and cholesterol restricted)

## 2019-08-06 NOTE — DISCHARGE NOTE PROVIDER - HOSPITAL COURSE
79M with Past Medical History HTN and COPD (off supplemental oxygen), current smoker,  last saw PMD 5 years prior presentation and last took any medication 1 year prior to presentation. He was admitted for fever and cough, with suspicion of community acquired pneumonia vs. aspiration. His examination was positive for bibasilar crackles on auscultation, with chest CT positive for patchy nodularity in the right lower lobe, limited by respiratory motion suggestive of airway infection such as bronchitis versus pneumonia. He received azithromycin and ceftriaxone for treatment of suspected community acquired pneumonia, then received unasyn IV with notable improvement. He has had hypotensive episodes overnight each night which have resolved spontaneously or with 500mL bolus of NS. He and his family are aware that he is to be discharged on augmentin 875 mg PO q12h for 5 days.        Prior functional status: Patient was living with wife and son in a PH, independent with all ADLs (although helped by son and wife from time to time), no use of AD        Admission Physical Exam:    Vital signs stable. Afebrile    Gen: alert, NAD    Cardio: RRR    Lungs: b/l crackles    Abd: soft, NT    Extremities: without edema. PROM wnl.    Neuro: A&Ox3, NAD    Cranial nerves: wnl    Motor Power: 4/5 in all extremities throughout all joints    Sensation: intact        Hospital Course: The patient was admitted to the acute inpatient rehab unit presenting with a decline in functional status. The patient participated in three hours of multidisciplinary therapy 5-6 days per week. The patient was continued on all home medications or equivalent alternatives as deemed appropriate. The patient received prophylactic anticoagulation medication and was monitored closely with no complications. During the stay on the inpatient unit, the patient showed as much progress as had been anticipated and was cleared for discharge by a multidisciplinary team.    8/4 Xray shows new opacity - informed ID who said continue therapy. Saturday team ordered dopplers and echo to assess for reason of SOB not done as of yet. After echo consider Pulm consult as per discussion with Sunday attending and ID. Remaining on fluids for low BP, therapy held as per discussion with attending.     8/3: CXR: new LLL opacity    8/4: LE Doppler: No DVT    8/5: Pulmunology consult: Continue ATB, recommendation of speech and swallow evaluation for suspicion of micro aspirations.            Discharge functional status: Ambulates 50ft x2 with Rolling Walker min assist with forward trunk bending posture        Discharge disposition: Home to family HPI:     79M with Past Medical History HTN and COPD (off supplemental oxygen), last saw PMD 5 years prior to presentation and last took any medication 1 year prior to presentation, who was admitted for fever and cough, with suspicion of community acquired pneumonia vs. aspiration. His examination was positive for bibasilar crackles on auscultation, with chest CT positive for Patchy nodularity in the right lower lobe, limited by respiratory motion suggestive of airway infection such as bronchitis versus pneumonia. He received azithromycin and ceftriaxone for treatment of suspected community acquired pneumonia, then received unasyn IV with notable improvement. He has had hypotensive episodes overnight in which resolved spontaneously or for which he received a 500mL bolus of NS. He and his family are aware that he is to be discharged on augmentin 875 mg PO q12h for 5 days.             Prior functional status: Independent         Admission Physical Exam:    General:  NAD, thin male, alert    VSS, stable, follow commands, no fever    Resp: CTA B/L    CV: RRR, S1S2    GI: Soft, flat, ND    Extremities: without edema. 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 Cogwheel rigidity LUE> RUE. Rigidity in BL lower extremities.     Sensation grossly intact in b/l            Hospital Course: The patient was admitted to the acute inpatient rehab unit presenting with a decline in functional status. The patient participated in three hours of multidisciplinary therapy 5-6 days per week. The patient was continued on all home medications or equivalent alternatives as deemed appropriate. The patient received prophylactic anticoagulation medication and was monitored closely with no complications.         Inpatient rehab daily:    8/3: CXR: new LLL opacity    8/4: LE Doppler: No DVT    8/4 Xray shows new opacity - informed ID who said continue therapy. Saturday team ordered dopplers and echo to assess for reason of SOB not done as of yet. After echo consider Pulm consult as per discussion with Sunday attending and ID. Remaining on fluids for low BP, therapy held as per discussion with attending.     8/5: Pulmunology consult: Continue ATB, recommendation of speech and swallow evaluation for suspicion of micro aspirations.    8/6 modified barium swallow ordered    8/7/19: Swallow study demonstrated high-risk for aspiration. Patient placed on puree-thins for dysphagia. CT head and C-spine ordered as well as labs recommended by neurology.    Barium swallow 8/7/19 showed dysphagia requiring puree-thin diet.     8/12/19: Patient doing well after diet changed to puree thins.     8/14/19: Follow up chest xray to reassess.            Discharge functional status:      Physical therapy: Patient ambulated 150 RW min A. Kyphotic postuse, decrease step length.            Occupational therapy    Eating: Min A with scooping +setup A    Grooming: Min A with combin ghair +setup A    Bathing:  Mod A with buttock and BL lower legs/feet    UP-Dressing: Min A with wrap-around garment     LB-Dressing: Mod A with b/l socks /shoes and adjusting to waist     Hygiene/ toileting:Max A with clothing mgt. and hygiene    Mobility-bed: Min A supine <--> sit    Transfers-Bed/ Chair: Mod A SPT    Transfers - Toilet :Mod A SPT with commode    Transfers- Tub: Mod A SPT with tub bench         Discharge disposition: Home with assistance HPI:     79M with Past Medical History HTN and COPD (off supplemental oxygen), last saw PMD 5 years prior to presentation and last took any medication 1 year prior to presentation, who was admitted for fever and cough, with suspicion of community acquired pneumonia vs. aspiration. His examination was positive for bibasilar crackles on auscultation, with chest CT positive for Patchy nodularity in the right lower lobe, limited by respiratory motion suggestive of airway infection such as bronchitis versus pneumonia. He received azithromycin and ceftriaxone for treatment of suspected community acquired pneumonia, then received unasyn IV with notable improvement. He has had hypotensive episodes overnight in which resolved spontaneously or for which he received a 500mL bolus of NS. He and his family are aware that he is to be discharged on augmentin 875 mg PO q12h for 5 days.             Prior functional status: Independent         Admission Physical Exam:    General:  NAD, thin male, alert    VSS, stable, follow commands, no fever    Resp: CTA B/L    CV: RRR, S1S2    GI: Soft, flat, ND    Extremities: without edema. 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 Cogwheel rigidity LUE> RUE. Rigidity in BL lower extremities.     Sensation grossly intact in b/l            Hospital Course: The patient was admitted to the acute inpatient rehab unit presenting with a decline in functional status. The patient participated in three hours of multidisciplinary therapy 5-6 days per week. The patient was continued on all home medications or equivalent alternatives as deemed appropriate. The patient received prophylactic anticoagulation medication and was monitored closely with no complications.         Inpatient rehab daily:    8/3: CXR: new LLL opacity    8/4: LE Doppler: No DVT    8/4 Xray shows new opacity - informed ID who said continue therapy. Saturday team ordered dopplers and echo to assess for reason of SOB not done as of yet. After echo consider Pulm consult as per discussion with Sunday attending and ID. Remaining on fluids for low BP, therapy held as per discussion with attending.     8/5: Pulmunology consult: Continue ATB, recommendation of speech and swallow evaluation for suspicion of micro aspirations.    8/6 modified barium swallow ordered    8/7/19: Swallow study demonstrated high-risk for aspiration. Patient placed on puree-thins for dysphagia. CT head and C-spine ordered as well as labs recommended by neurology.    Barium swallow 8/7/19 showed dysphagia requiring puree-thin diet.     8/12/19: Patient doing well after diet changed to puree thins.     8/14/19: Follow up chest x-ray to reassess.    8/15/19: Patient continues to do well. Pending CXR 8/16/19.             Discharge functional status:      Physical therapy: Patient ambulated 150 RW min A. Kyphotic postuse, decrease step length.            Occupational therapy    Eating: Min A with scooping +setup A    Grooming: Min A with combin ghair +setup A    Bathing:  Mod A with buttock and BL lower legs/feet    UP-Dressing: Min A with wrap-around garment     LB-Dressing: Mod A with b/l socks /shoes and adjusting to waist     Hygiene/ toileting:Max A with clothing mgt. and hygiene    Mobility-bed: Min A supine <--> sit    Transfers-Bed/ Chair: Mod A SPT    Transfers - Toilet :Mod A SPT with commode    Transfers- Tub: Mod A SPT with tub bench         Discharge disposition: Home with assistance HPI:     79M with Past Medical History HTN and COPD (off supplemental oxygen), last saw PMD 5 years prior to presentation and last took any medication 1 year prior to presentation, who was admitted for fever and cough, with suspicion of community acquired pneumonia vs. aspiration. His examination was positive for bibasilar crackles on auscultation, with chest CT positive for Patchy nodularity in the right lower lobe, limited by respiratory motion suggestive of airway infection such as bronchitis versus pneumonia. He received azithromycin and ceftriaxone for treatment of suspected community acquired pneumonia, then received unasyn IV with notable improvement. He has had hypotensive episodes overnight in which resolved spontaneously or for which he received a 500mL bolus of NS. He and his family are aware that he is to be discharged on augmentin 875 mg PO q12h for 5 days.             Prior functional status: Independent         Admission Physical Exam:    General:  NAD, thin male, alert    VSS, stable, follow commands, no fever    Resp: CTA B/L    CV: RRR, S1S2    GI: Soft, flat, ND    Extremities: without edema. 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 Cogwheel rigidity LUE> RUE. Rigidity in BL lower extremities.     Sensation grossly intact in b/l            Hospital Course: The patient was admitted to the acute inpatient rehab unit presenting with a decline in functional status. The patient participated in three hours of multidisciplinary therapy 5-6 days per week. The patient was continued on all home medications or equivalent alternatives as deemed appropriate. The patient received prophylactic anticoagulation medication and was monitored closely with no complications.         Inpatient rehab daily:    8/3: CXR: new LLL opacity    8/4: LE Doppler: No DVT    8/4 Xray shows new opacity - informed ID who said continue therapy. Saturday team ordered dopplers and echo to assess for reason of SOB not done as of yet. After echo consider Pulm consult as per discussion with Sunday attending and ID. Remaining on fluids for low BP, therapy held as per discussion with attending.     8/5: Pulmunology consult: Continue ATB, recommendation of speech and swallow evaluation for suspicion of micro aspirations.    8/6 modified barium swallow ordered    8/7/19: Swallow study demonstrated high-risk for aspiration. Patient placed on puree-thins for dysphagia. CT head and C-spine ordered as well as labs recommended by neurology.    Barium swallow 8/7/19 showed dysphagia requiring puree-thin diet.     8/12/19: Patient doing well after diet changed to puree thins.     8/14/19: Follow up chest x-ray to reassess.    8/15/19: Patient continues to do well.         Discharge functional status:      Physical therapy: Patient ambulated 150 RW min A. Kyphotic postuse, decrease step length.            Occupational therapy    Eating: Min A with scooping +setup A    Grooming: Min A with combin ghair +setup A    Bathing:  Mod A with buttock and BL lower legs/feet    UP-Dressing: Min A with wrap-around garment     LB-Dressing: Mod A with b/l socks /shoes and adjusting to waist     Hygiene/ toileting:Max A with clothing mgt. and hygiene    Mobility-bed: Min A supine <--> sit    Transfers-Bed/ Chair: Mod A SPT    Transfers - Toilet :Mod A SPT with commode    Transfers- Tub: Mod A SPT with tub bench         Discharge disposition: Home with assistance

## 2019-08-06 NOTE — DISCHARGE NOTE PROVIDER - CARE PROVIDERS DIRECT ADDRESSES
,DirectAddress_Unknown,[email or web address] ,DirectAddress_Unknown,[email or web address],DirectAddress_Unknown

## 2019-08-15 NOTE — DISCHARGE NOTE NURSING/CASE MANAGEMENT/SOCIAL WORK - NSDCDPATPORTLINK_GEN_ALL_CORE
You can access the [employer] Patient Portal, offered by [facility], by registering with the following website: [email or web address]

## 2019-10-09 NOTE — ED ADULT TRIAGE NOTE - CHIEF COMPLAINT QUOTE
Pt brought in by family for generalized weakness fever, chills and difficulty swallowing x 4-5 days.  As per family, Pt was admitted to hospital two months ago for issues swallowing,

## 2019-10-09 NOTE — ED PROVIDER NOTE - ATTENDING CONTRIBUTION TO CARE
79 M to ED with weakness,   family states he has not been eating and drinking, losing wt, and unable to walk without help.  Also c/o difficulty swallowing.

## 2019-10-09 NOTE — ED ADULT NURSE NOTE - OBJECTIVE STATEMENT
patient complaints of unproductive cough and generalized weakness.  Patient family reports unknown degree of fever at home, no fever in triage. Patient deneis chest pain and no SOB noted.

## 2019-10-09 NOTE — ED PROVIDER NOTE - NS ED ROS FT
Review of Systems:  	•	CONSTITUTIONAL - no fever, no diaphoresis, no chills  	•	SKIN - no rash  	•	HEMATOLOGIC - no bleeding, no bruising  	•	EYES - no eye pain, no blurry vision  	•	ENT - no congestion  	•	RESPIRATORY - no shortness of breath, no cough  	•	CARDIAC - no chest pain, no palpitations  	•	GI - +difficulty swallowing, +loss of appetite, no abd pain, no nausea, no vomiting, no diarrhea, no constipation  	•	GENITO-URINARY - no dysuria; no hematuria, no increased urinary frequency  	•	MUSCULOSKELETAL - no joint paint, no swelling, no redness  	•	NEUROLOGIC - no weakness, no headache, no paresthesias, no LOC  	•	PSYCH - no anxiety, no depression  	All other ROS are negative except as documented in HPI.

## 2019-10-09 NOTE — ED ADULT NURSE NOTE - SUICIDE SCREENING QUESTION 1
Final Anesthesia Post-op Assessment    Patient: Charlene D Bontemps  Procedure(s) Performed: SUCTION DILATATION AND CURETTAGE  Anesthesia type: General    Vital Last Value   Temperature 37.5 °C (99.5 °F) (12/19/18 1631)   Pulse 96 (12/19/18 1641)   Respiratory Rate (18) (12/19/18 1419)   Non-Invasive   Blood Pressure 137/59 (12/19/18 1636)   Arterial  Blood Pressure     Pulse Oximetry 100 % (12/19/18 1641)     Last 24 I/O:     Intake/Output Summary (Last 24 hours) at 12/19/2018 1647  Last data filed at 12/19/2018 1636  Gross per 24 hour   Intake 950 ml   Output 150 ml   Net 800 ml     Mental status recovered: patient participates in evaluation.  VS noted and are stable.  Respiratory function satisfactory; airway patent.  Cardiovascular function and hydration status satisfactory.  Adequate pain control.  Nausea and vomiting control satisfactory.  No apparent anesthetic complications.         
No

## 2019-10-09 NOTE — ED PROVIDER NOTE - PROGRESS NOTE DETAILS
Pt received as sign out from PA. 80y/o male with PMH of HTN, HLD COPD who presents to ED for generalized weakness, difficulty eating and swallowing at home. Labs and UA noted. Patient to be admitted to an inpatient floor. Case discussed with and care endorsed to medical admitting resident. Admitting physician notified.

## 2019-10-09 NOTE — ED PROVIDER NOTE - OBJECTIVE STATEMENT
78 yo M with pmhx of COPD, HTN, HLD presenting for generalized weakness over the last 1 week. As per family patient has been having difficulty swallowing/eating, cough, and has been very week. Symptoms are moderate and worsening. Son reports decrease in urine output. No cp, sob, fever, chills, abdominal pain, nausea, vomiting, diarrhea, back pain, dysuria, headache, dizziness, or paresthesias.

## 2019-10-09 NOTE — ED PROVIDER NOTE - PHYSICAL EXAMINATION
VITAL SIGNS: I have reviewed nursing notes and confirm.  CONSTITUTIONAL: Elderly, cachetic appearing male in no acute distress.  SKIN: Skin exam is warm and dry, no acute rash.  HEAD: Normocephalic; atraumatic.  EYES: PERRL, EOM intact; conjunctiva and sclera clear.  ENT: No nasal discharge; airway clear.   NECK: Supple; non tender.  CARD: S1, S2 normal; no murmurs, gallops, or rubs. Regular rate and rhythm.  RESP: Clear to auscultation bilaterally. No wheezes, rales or rhonchi.  ABD: Normal bowel sounds; soft; non-distended; non-tender.   EXT: Normal ROM. No edema.  LYMPH: No acute cervical adenopathy.  NEURO: Alert, oriented. Grossly unremarkable. No focal deficits.  PSYCH: Cooperative, appropriate.

## 2019-10-10 NOTE — H&P ADULT - ASSESSMENT
79M with Past Medical History HTN and COPD (off supplemental oxygen), recently admitted to [facility] for pneumonia, was brought to ER to day by his son and family members for failure to thrive. Could not get history from the patient. As per the sign out and charts pt had difficulty swallowing/eating, cough, and has been very week. His symptoms were progressively getting worse so he was brought to ER by his son.    # Failure to thrive  - encourage PO intake  - possible UTI, start Rocephin f/u urine culture  - PT/Rehab eval  - I/V fluids D5 + 1/2 Saline @ 75cc/hr    # Hypernatremia  - likely from decreased po intake  - f/u repeat BMP  - I/V fluids D5 + 1/2 Saline @ 75cc/hr    # HTN  - currently BP on lower side  - monitor BP    # COPD   - stable  - duonebs prn    # Elevated WBCs likely from UTI  - f/u pan cx, CXR  - c/w Rocephin for possible UTI.    DVT PPx: Lovenox 40 mg s/c  GI PPX: Protonix 40 mg PO   Diet: Regular  Activity: Increase as tolerated  Dispo: from home, will likely need placement  Code Status: full code 79M with Past Medical History HTN and COPD (off supplemental oxygen), recently admitted to [facility] for pneumonia, was brought to ER to day by his son and family members for failure to thrive. Could not get history from the patient. As per the sign out and charts pt had difficulty swallowing/eating, cough, and has been very week. His symptoms were progressively getting worse so he was brought to ER by his son.    # Failure to thrive  - encourage PO intake  - likely UTI related, start Rocephin f/u urine culture  - PT/Rehab eval  - I/V fluids D5 + 1/2 Saline @ 75cc/hr    # Hypernatremia  - likely from decreased po intake  - f/u repeat BMP  - I/V fluids D5 + 1/2 Saline @ 75cc/hr    # HTN  - currently BP on lower side  - monitor BP    # COPD   - stable  - duonebs prn    # Elevated WBCs likely from UTI  - f/u pan cx, CXR  - c/w Rocephin for possible UTI.    DVT PPx: Lovenox 40 mg s/c  GI PPX: Protonix 40 mg PO   Diet: Regular  Activity: Increase as tolerated  Dispo: from home, will likely need placement  Code Status: full code 79M with Past Medical History HTN and COPD (off supplemental oxygen), recently admitted to [facility] for pneumonia, was brought to ER to day by his son and family members for failure to thrive. Could not get history from the patient. As per the sign out and charts pt had difficulty swallowing/eating, cough, and has been very week. His symptoms were progressively getting worse so he was brought to ER by his son.    # Failure to thrive  - encourage PO intake  - likely UTI related, start Rocephin f/u urine culture  - PT/Rehab eval  - I/V fluids D5 + 1/2 Saline @ 75cc/hr    # Hypernatremia  - likely from decreased po intake  - f/u repeat BMP  - I/V fluids D5 + 1/2 Saline @ 75cc/hr    # HTN  - currently BP on lower side  - monitor BP    # COPD   - stable  - duonebs prn    # Elevated WBCs likely from UTI  - f/u pan cx, CXR  - c/w Rocephin for possible UTI., culture f/u    DVT PPx: Lovenox 40 mg s/c  GI PPX: Protonix 40 mg PO   Diet: Regular  Activity: Increase as tolerated  Dispo: from home, will likely need placement  Code Status: full code

## 2019-10-10 NOTE — SWALLOW BEDSIDE ASSESSMENT ADULT - SWALLOW EVAL: DIAGNOSIS
No s/s aspiration/penetration for nectar thick liquids, puree, mechanical soft. Suspected pharyngeal dysphagia for thin liquids

## 2019-10-10 NOTE — PATIENT PROFILE ADULT - FUNCTIONAL SCREEN CURRENT LEVEL: COMMUNICATION, MLM
pt confused,  phone used and patient could not understand as he is hard of hearing, PCA on the floor used and patient did not answer questions correctly.

## 2019-10-10 NOTE — PATIENT PROFILE ADULT - NSPRESCRALCSCORE_GEN_A_NUR_CAL
"SUBJECTIVE:  Ronni Bean is a 42 year old male who presents to the clinic today for evaluation of cough, sinus congestion, thick nasal sputum, frontal headache with cough.   Chest pain with cough, shortness of breath  Onset 8 days ago, got worse about 3 days in and has been unchanged  Had a fever the first few days, M. Temp 101.2, is afebrile now  Cough is tight and dry to productive    Treatments - Dayquil/Nyquil, ibuprofen  Exposures - work related  History of asthma - negative.   Environmental allergies is - seasonal pollens.   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         Allergies   Allergen Reactions     Fish Oil Nausea     Sulfasalazine Nausea and Vomiting     No Clinical Screening - See Comments Other (See Comments)     Pt states almost  from MMR vaccine.      Sulfa Drugs Nausea and Vomiting         ROS  General fatigue  HENT POSITIVE per HPI  Respiratory POSITIVE per HPI  Abdomen negative      OBJECTIVE:  Exam:  Vitals:    19 1925   BP: (!) 158/110   Pulse: 68   Resp: 20   Temp: 97.6  F (36.4  C)   TempSrc: Tympanic   SpO2: 96%   Weight: 66.8 kg (147 lb 3.2 oz)   Height: 1.676 m (5' 6\")     General: healthy, alert, appears hydarated, mild distress, VS noted elevated BP  Head: NORMAL - " atraumatic, nontender.  Ears: mild clear effusion bilaterally  Eyes: NORMAL - no injection no discharge, no periorbital swelling.  Nose: ABNORMAL - swollen nasal turbinates. No sinus tenderness  Neck: supple, non-tender, free range of motion, no adenopathy  Throat: ABNORMAL - erythematous. No exudates or petechia  Resp: ABNORMAL - diminished breath sounds, shallow breathing, wheezing and rhonchi throughout  Cardiac: NORMAL - regular rate and rhythm without murmur.    Image deferred by patient    ASSESSMENT:    (J22) Lower respiratory infection  (primary encounter diagnosis)  Plan: azithromycin (ZITHROMAX) 250 MG tablet,         albuterol (PROAIR HFA/PROVENTIL HFA/VENTOLIN         HFA) 108 (90 Base) MCG/ACT inhaler, predniSONE         (DELTASONE) 20 MG tablet, benzonatate         (TESSALON) 200 MG capsule, guaiFENesin-codeine         (ROBITUSSIN AC) 100-10 MG/5ML solution      (R05) Cough  Plan: dexamethasone (DECADRON) oral solution (inj         used orally) 10 mg, ipratropium - albuterol 0.5        mg/2.5 mg/3 mL (DUONEB) neb solution 3 mL        PLAN:    BP elevated, has been taking lisinopril about 1.5 years. Patient is on lisinopril 10 mg daily. He reports his blood pressure has been creeping up lately and acknowledges he needs to see his PCP.     Lower respiratory illness likely bronchitis but cannot rule out pneumonia  Patient declines chest XR today  Dexamethasone and Duoneb given with improved air flow, expiratory wheezing persists  Rest, fluids  Humidifier, vicks vapor rub  Start Azithromycin 250 mg oral tablet, take 2 tablets day 1, 1 tablet day 2-5  Start prednisone 20 mg oral tablet, take 2 tablets in AM with food for 4 days. Start tomorrow.   Start Albuterol inhaler 1-2 puffs every 4-6 hours as needed for cough, shortness of breath, wheezing  OTC mucinex as directed  Benzonatate 200 mg oral capsule take one capsule 3 times daily as needed  Robitussin AC, take 1-2 tsp every 4-6 hours as needed for cough,  this can cause drowsiness. Do not mix with alcohol or drive while on this mediations.   Follow up with PCP within a week or at soonest available for BP recheck  Patient received verbal and written instruction including review of warning signs    Mackenzie Wright PA-C on 9/16/2019 at 10:13 AM       0

## 2019-10-10 NOTE — CONSULT NOTE ADULT - SUBJECTIVE AND OBJECTIVE BOX
HPI:  79M with Past Medical History HTN and COPD (off supplemental oxygen), recently admitted to [facility] for pneumonia, was brought to ER to day by his son and family members for failure to thrive. Could not get history from the patient. He is AAO X 1, tried calling son left messages he did not get back to me. As per the sign out and charts pt had difficulty swallowing/eating, cough, and has been very week. His symptoms were progressively getting worse so he was brought to ER by his son. Pt did endorse urinary burning and some discomfort while urinating. Pt denied any hx of fever, chills, nausea, vomiting, diarrhea, constipation, melena, pain in abdomen, sob, chest pain, cough, dysuria, headache, lightheadedness, dizziness, vertigo, localized weakness or numbness/tingling. (10 Oct 2019 01:43)      PAST MEDICAL & SURGICAL HISTORY:  HTN (hypertension)  Emphysematous COPD      Hospital Course:    TODAY'S SUBJECTIVE & REVIEW OF SYMPTOMS:     Constitutional WNL   Cardio WNL   Resp cough  Heme WNL  Endo WNL  Skin WNL  MSK WNL  Neuro WNL  Cognitive confused  Psych WNL      MEDICATIONS  (STANDING):  ALBUTerol/ipratropium for Nebulization 3 milliLiter(s) Nebulizer every 6 hours  cefTRIAXone   IVPB 1000 milliGRAM(s) IV Intermittent every 24 hours  chlorhexidine 2% Cloths 1 Application(s) Topical <User Schedule>  chlorhexidine 4% Liquid 1 Application(s) Topical <User Schedule>  dextrose 5% + sodium chloride 0.45%. 1000 milliLiter(s) (75 mL/Hr) IV Continuous <Continuous>  enoxaparin Injectable 40 milliGRAM(s) SubCutaneous daily  pantoprazole    Tablet 40 milliGRAM(s) Oral before breakfast  tamsulosin 0.4 milliGRAM(s) Oral at bedtime    MEDICATIONS  (PRN):      FAMILY HISTORY:  FHx: pneumonia      Allergies    No Known Allergies    Intolerances        SOCIAL HISTORY:    [  ] Etoh  [  ] Smoking  [  ] Substance abuse     Home Environment:  [  ] Home Alone  [x  ] Lives with Family  [  ] Home Health Aid    Dwelling:  [x  ] Apartment  [  ] Private House  [  ] Adult Home  [  ] Skilled Nursing Facility      [  ] Short Term  [  ] Long Term  [  ] Stairs       Elevator [  ]    FUNCTIONAL STATUS PTA: (Check all that apply)  Ambulation: [   ]Independent    [ x ] Dependent     [  ] Non-Ambulatory  Assistive Device: [  ] SA Cane  [  ]  Q Cane  [  ] Walker  [  ]  Wheelchair  ADL : [  ] Independent  [x  ]  Dependent       Vital Signs Last 24 Hrs  T(C): 36 (10 Oct 2019 04:58), Max: 36.5 (09 Oct 2019 18:06)  T(F): 96.8 (10 Oct 2019 04:58), Max: 97.7 (09 Oct 2019 18:06)  HR: 80 (10 Oct 2019 04:58) (54 - 86)  BP: 101/51 (10 Oct 2019 04:58) (101/51 - 109/60)  BP(mean): --  RR: 18 (10 Oct 2019 04:58) (17 - 18)  SpO2: 96% (10 Oct 2019 02:15) (93% - 96%)      PHYSICAL EXAM: Awake / confused  GENERAL: NAD  HEAD:  Atraumatic, Normocephalic  CHEST/LUNG: Clear   HEART: S1S2+  ABDOMEN: Soft, Nontender  EXTREMITIES:  no calf tenderness, right hand 2-3 finger deformity    NERVOUS SYSTEM:  Cranial Nerves 2-12 intact [  ] Abnormal  [  ]  ROM: WFL all extremities [ x ]  Abnormal [  ]  Motor Strength: WFL all extremities  [  ]  Abnormal [x4/5 all ext  ]  Sensation: intact to light touch [  ] Abnormal [  ]  Reflexes: Symmetric [  ]  Abnormal [  ]    FUNCTIONAL STATUS:  Bed Mobility: Independent [  ]  Supervision [  ]  Needs Assistance [x  ]  N/A [  ]  Transfers: Independent [  ]  Supervision [  ]  Needs Assistance [ x ]  N/A [  ]   Ambulation: Independent [  ]  Supervision [  ]  Needs Assistance [  ]  N/A [  ]  ADL: Independent [  ] Requires Assistance [  ] N/A [  ]      LABS:                        10.3   12.22 )-----------( 157      ( 10 Oct 2019 07:28 )             31.7     10-10    146  |  108  |  17  ----------------------------<  116<H>  3.6   |  25  |  0.7    Ca    8.7      10 Oct 2019 07:28  Mg     2.0     10-10    TPro  6.4  /  Alb  3.1<L>  /  TBili  0.3  /  DBili  x   /  AST  21  /  ALT  6   /  AlkPhos  47  10-10      Urinalysis Basic - ( 09 Oct 2019 22:18 )    Color: Yellow / Appearance: Clear / S.022 / pH: x  Gluc: x / Ketone: Negative  / Bili: Negative / Urobili: <2 mg/dL   Blood: x / Protein: 30 mg/dL / Nitrite: Negative   Leuk Esterase: Negative / RBC: 28 /HPF / WBC 10 /HPF   Sq Epi: x / Non Sq Epi: 2 /HPF / Bacteria: Negative        RADIOLOGY & ADDITIONAL STUDIES:    Assesment:

## 2019-10-10 NOTE — PHYSICAL THERAPY INITIAL EVALUATION ADULT - GENERAL OBSERVATIONS, REHAB EVAL
13:15-13:45. chart reviewed. Pt received semi-narayanan at B/S, confused, oriented X 1, able to follow one step instruction, Cymraes speaking, Translation via Medical staff Adil (PT). + IV, Cachectic, denies pain or discomfort, initial vitals 103/53 HR 93 SPO2 on RA 94%. NAD.

## 2019-10-10 NOTE — CONSULT NOTE ADULT - ASSESSMENT
IMPRESSION: Rehab of gait dysfunction       PRECAUTIONS: [  ] Cardiac  [  ] Respiratory  [  ] Seizures [  ] Contact Isolation  [  ] Droplet Isolation  [  ] Other    Weight Bearing Status:     RECOMMENDATION:    Out of Bed to Chair     DVT/Decubiti Prophylaxis    REHAB PLAN:     [ x  ] Bedside P/T 3-5 times a week   [   ]   Bedside O/T  2-3 times a week             [   ] No Rehab Therapy Indicated                   [  x ]  Speech Therapy   Conditioning/ROM                                    ADL  Bed Mobility                                               Conditioning/ROM  Transfers                                                     Bed Mobility  Sitting /Standing Balance                         Transfers                                        Gait Training                                               Sitting/Standing Balance  Stair Training [   ]Applicable                    Home equipment Eval                                                                        Splinting  [   ] Only      GOALS:   ADL   [   ]   Independent                    Transfers  [ x  ] Independent                          Ambulation  [  x ] Independent     [ x   ] With device                            [   ]  CG                                                         [   ]  CG                                                                  [   ] CG                            [    ] Min A                                                   [   ] Min A                                                              [   ] Min  A          DISCHARGE PLAN:   [   ]  Good candidate for Intensive Rehabilitation/Hospital based                                             Will tolerate 3hrs Intensive Rehab Daily                                       [  x  ]  Short Term Rehab in Skilled Nursing Facility                       vs                [ x   ]  Home with Outpatient or VN services                                         [    ]  Possible Candidate for Intensive Hospital based Rehab

## 2019-10-10 NOTE — PHYSICAL THERAPY INITIAL EVALUATION ADULT - IMPAIRMENTS FOUND, PT EVAL
gait, locomotion, and balance/integumentary integrity/muscle strength/poor safety awareness/posture/aerobic capacity/endurance/cognitive impairment

## 2019-10-10 NOTE — PHYSICAL THERAPY INITIAL EVALUATION ADULT - PERTINENT HX OF CURRENT PROBLEM, REHAB EVAL
79M with Past Medical History HTN and COPD (off supplemental oxygen), recently admitted to [facility] for pneumonia, was brought to ER to day by his son and family members for failure to thrive. Could not get history from the patient. As per the sign out and charts pt had difficulty swallowing/eating, cough, and has been very week. His symptoms were progressively getting worse so he was brought to ER by his son.

## 2019-10-10 NOTE — PATIENT PROFILE ADULT - EQUIPMENT CURRENTLY USED AT HOME
.Patient here for instill. Patient reports feeling upset about cx surgery. Pt states she was unsure and felt she \"rushed into it w/o asking enough questions. \" pt states her and her  have done some research and she would like to start amitriptylin
unable to obtain, pt is confused

## 2019-10-10 NOTE — H&P ADULT - NSHPLABSRESULTS_GEN_ALL_CORE
12.3   14.75 )-----------( 197      ( 09 Oct 2019 20:20 )             38.0   10-    148<H>  |  106  |  19  ----------------------------<  129<H>  3.8   |  29  |  1.0    Ca    9.3      09 Oct 2019 20:20    TPro  7.9  /  Alb  3.6  /  TBili  0.5  /  DBili  x   /  AST  29  /  ALT  8   /  AlkPhos  58  10-09  CARDIAC MARKERS ( 09 Oct 2019 20:20 )  x     / <0.01 ng/mL / x     / x     / x        < from: 12 Lead ECG (10.09.19 @ 19:28) >    Diagnosis Line Normal sinus rhythm  Septal infarct , age undetermined  Abnormal ECG    < end of copied text >    Urinalysis Basic - ( 09 Oct 2019 22:18 )    Color: Yellow / Appearance: Clear / S.022 / pH: x  Gluc: x / Ketone: Negative  / Bili: Negative / Urobili: <2 mg/dL   Blood: x / Protein: 30 mg/dL / Nitrite: Negative   Leuk Esterase: Negative / RBC: 28 /HPF / WBC 10 /HPF   Sq Epi: x / Non Sq Epi: 2 /HPF / Bacteria: Negative

## 2019-10-10 NOTE — H&P ADULT - HISTORY OF PRESENT ILLNESS
79M with Past Medical History HTN and COPD (off supplemental oxygen), recently admitted to [facility] for pneumonia, was brought to ER to day by his son and family members for failure to thrive. Could not get history from the patient. As per the sign out and charts pt had difficulty swallowing/eating, cough, and has been very week. His symptoms were progressively getting worse so he was brought to ER by his son. Pt denied any hx of fever, chills, nausea, vomiting, diarrhea, constipation, melena, pain in abdomen, sob, chest pain, cough, increased urinary frequency, dysuria, headache, lightheadedness, dizziness, vertigo, localized weakness or numbness/tingling. 79M with Past Medical History HTN and COPD (off supplemental oxygen), recently admitted to [facility] for pneumonia, was brought to ER to day by his son and family members for failure to thrive. Could not get history from the patient. He is AAO X 1, tried calling son left messages he did not get back to me. As per the sign out and charts pt had difficulty swallowing/eating, cough, and has been very week. His symptoms were progressively getting worse so he was brought to ER by his son. Pt did endorse urinary burning and some discomfort while urinating. Pt denied any hx of fever, chills, nausea, vomiting, diarrhea, constipation, melena, pain in abdomen, sob, chest pain, cough, dysuria, headache, lightheadedness, dizziness, vertigo, localized weakness or numbness/tingling. 79M with Past Medical History HTN and COPD (off supplemental oxygen), recently admitted to [facility] for pneumonia, was brought to ER to day by his son and family members for failure to thrive. Could not get history from the patient. He is AAO X 1, tried calling son left messages he did not get back to me. As per the sign out and charts pt had difficulty swallowing/eating, cough, and has been very week. His symptoms were progressively getting worse so he was brought to ER by his son. Pt did endorse urinary burning and some discomfort while urinating. Pt denied any hx of fever, chills, nausea, vomiting, diarrhea, constipation, melena, pain in abdomen, sob, chest pain, cough, dysuria, headache, lightheadedness, dizziness, vertigo, localized weakness or numbness/tingling.  confused, only knows his name

## 2019-10-11 NOTE — PROGRESS NOTE ADULT - SUBJECTIVE AND OBJECTIVE BOX
Hospital Day:  2d    Subjective:    Patient is a 79y old  Male who presents with a chief complaint of failure to thrive (10 Oct 2019 09:17)    There were no acute overnight events. The patient was seen and examined at the bedside. No complaints. Denies fever, chills, headache, dizziness, chest pain, palpitations, shortness of breath, abdominal pain, nausea, vomiting, diarrhea.    Past Medical Hx:   HTN (hypertension)  Emphysematous COPD    Past Sx:    Allergies:  No Known Allergies    Current Meds:   Standng Meds:  ALBUTerol/ipratropium for Nebulization 3 milliLiter(s) Nebulizer every 6 hours  cefTRIAXone   IVPB 1000 milliGRAM(s) IV Intermittent every 24 hours  chlorhexidine 2% Cloths 1 Application(s) Topical <User Schedule>  chlorhexidine 4% Liquid 1 Application(s) Topical <User Schedule>  dextrose 5% + sodium chloride 0.45%. 1000 milliLiter(s) (75 mL/Hr) IV Continuous <Continuous>  enoxaparin Injectable 40 milliGRAM(s) SubCutaneous daily  pantoprazole    Tablet 40 milliGRAM(s) Oral before breakfast  tamsulosin 0.4 milliGRAM(s) Oral at bedtime    PRN Meds:    HOME MEDICATIONS:  acetaminophen 325 mg oral tablet: 2 tab(s) orally every 6 hours, As needed, Mild Pain (1 - 3)  tamsulosin 0.4 mg oral capsule: 1 cap(s) orally once a day (at bedtime)      Vital Signs:   T(F): 97.2 (10-11-19 @ 05:06), Max: 98.5 (10-10-19 @ 13:34)  HR: 84 (10-11-19 @ 05:06) (84 - 89)  BP: 116/57 (10-11-19 @ 05:06) (99/51 - 116/57)  RR: 18 (10-11-19 @ 05:06) (18 - 18)  SpO2: 95% (10-11-19 @ 06:30) (95% - 95%)      10-10-19 @ 07:01  -  10-11-19 @ 07:00  --------------------------------------------------------  IN: 0 mL / OUT: 280 mL / NET: -280 mL        Physical Exam:   General: Patient lying in bed, cachectic/emaciated, NAD  HEENT: NCAT, conjunctiva clear, sclera white, PEERLA, EOMI, moist mucous membranes, normal orophaynx  Neck: No masses, no JVD, no cervical lymphadenopathy  Respiratory: lungs clear to auscultation bilaterally  CV: Normal rate, regular rhythm, normal S1/S2, no rubs, gallops, murmurs  GI: abdomen soft, non-tender, non-distended, no masses, normal bowel sounds  Extremities: Well-perfused, no clubbing, no cyanosis, no lower extremity edema bilaterally  Skin: warm and dry  Neurology: AAOx1, confused, nonfocal    Labs:                         10.3   12.22 )-----------( 157      ( 10 Oct 2019 07:28 )             31.7       10 Oct 2019 07:28    146    |  108    |  17     ----------------------------<  116    3.6     |  25     |  0.7      Ca    8.7        10 Oct 2019 07:28  Mg     2.0       10 Oct 2019 07:28    TPro  6.4    /  Alb  3.1    /  TBili  0.3    /  DBili  x      /  AST  21     /  ALT  6      /  AlkPhos  47     10 Oct 2019 07:28              Troponin <0.01, CKMB --, CK -- 10-09-19 @ 20:20        Urinalysis Basic - ( 09 Oct 2019 22:18 )    Color: Yellow / Appearance: Clear / S.022 / pH: x  Gluc: x / Ketone: Negative  / Bili: Negative / Urobili: <2 mg/dL   Blood: x / Protein: 30 mg/dL / Nitrite: Negative   Leuk Esterase: Negative / RBC: 28 /HPF / WBC 10 /HPF   Sq Epi: x / Non Sq Epi: 2 /HPF / Bacteria: Negative          Culture - Blood (collected 10-09-19 @ 20:20)  Source: .Blood Blood  Preliminary Report (10-11-19 @ 02:01):    No growth to date.    Culture - Blood (collected 10-09-19 @ 20:20)  Source: .Blood Blood  Preliminary Report (10-11-19 @ 02:01):    No growth to date.        Radiology: Hospital Day:  2d    Subjective:    Patient is a 79y old  Male who presents with a chief complaint of failure to thrive (10 Oct 2019 09:17)    There were no acute overnight events. The patient was seen and examined at the bedside. No complaints. Denies fever, chills, headache, dizziness, chest pain, palpitations, shortness of breath, abdominal pain, nausea, vomiting, diarrhea.    Past Medical Hx:   HTN (hypertension)  Emphysematous COPD    Past Sx:    Allergies:  No Known Allergies    Current Meds:   Standng Meds:  ALBUTerol/ipratropium for Nebulization 3 milliLiter(s) Nebulizer every 6 hours  cefTRIAXone   IVPB 1000 milliGRAM(s) IV Intermittent every 24 hours  chlorhexidine 2% Cloths 1 Application(s) Topical <User Schedule>  chlorhexidine 4% Liquid 1 Application(s) Topical <User Schedule>  dextrose 5% + sodium chloride 0.45%. 1000 milliLiter(s) (75 mL/Hr) IV Continuous <Continuous>  enoxaparin Injectable 40 milliGRAM(s) SubCutaneous daily  pantoprazole    Tablet 40 milliGRAM(s) Oral before breakfast  tamsulosin 0.4 milliGRAM(s) Oral at bedtime    PRN Meds:    HOME MEDICATIONS:  acetaminophen 325 mg oral tablet: 2 tab(s) orally every 6 hours, As needed, Mild Pain (1 - 3)  tamsulosin 0.4 mg oral capsule: 1 cap(s) orally once a day (at bedtime)      Vital Signs:   T(F): 97.2 (10-11-19 @ 05:06), Max: 98.5 (10-10-19 @ 13:34)  HR: 84 (10-11-19 @ 05:06) (84 - 89)  BP: 116/57 (10-11-19 @ 05:06) (99/51 - 116/57)  RR: 18 (10-11-19 @ 05:06) (18 - 18)  SpO2: 95% (10-11-19 @ 06:30) (95% - 95%)      10-10-19 @ 07:01  -  10-11-19 @ 07:00  --------------------------------------------------------  IN: 0 mL / OUT: 280 mL / NET: -280 mL        Physical Exam:   General: Patient lying in bed, cachectic/emaciated, NAD  HEENT: NCAT, conjunctiva clear, sclera white, PEERLA, EOMI, moist mucous membranes, normal orophaynx  Neck: No masses, no JVD, no cervical lymphadenopathy  Respiratory: lungs clear to auscultation bilaterally  CV: Normal rate, regular rhythm, normal S1/S2, no rubs, gallops, murmurs  GI: abdomen soft, non-tender, non-distended, no masses, normal bowel sounds  Extremities: Well-perfused, no clubbing, no cyanosis, no lower extremity edema bilaterally  Skin: warm and dry  Neurology: AAOx1, confused, nonfocal    Labs:                         10.3   12.22 )-----------( 157      ( 10 Oct 2019 07:28 )             31.7       10 Oct 2019 07:28    146    |  108    |  17     ----------------------------<  116    3.6     |  25     |  0.7      Ca    8.7        10 Oct 2019 07:28  Mg     2.0       10 Oct 2019 07:28    TPro  6.4    /  Alb  3.1    /  TBili  0.3    /  DBili  x      /  AST  21     /  ALT  6      /  AlkPhos  47     10 Oct 2019 07:28              Troponin <0.01, CKMB --, CK -- 10-09-19 @ 20:20        Urinalysis Basic - ( 09 Oct 2019 22:18 )    Color: Yellow / Appearance: Clear / S.022 / pH: x  Gluc: x / Ketone: Negative  / Bili: Negative / Urobili: <2 mg/dL   Blood: x / Protein: 30 mg/dL / Nitrite: Negative   Leuk Esterase: Negative / RBC: 28 /HPF / WBC 10 /HPF   Sq Epi: x / Non Sq Epi: 2 /HPF / Bacteria: Negative          Culture - Blood (collected 10-09-19 @ 20:20)  Source: .Blood Blood  Preliminary Report (10-11-19 @ 02:01):    No growth to date.    Culture - Blood (collected 10-09-19 @ 20:20)  Source: .Blood Blood  Preliminary Report (10-11-19 @ 02:01):    No growth to date. Hospital Day:  2d    Subjective:    Patient is a 79y old  Male who presents with a chief complaint of failure to thrive (10 Oct 2019 09:17)    There were no acute overnight events. The patient was seen and examined at the bedside. No complaints. Denies fever, chills, headache, dizziness, chest pain, palpitations, shortness of breath, abdominal pain, nausea, vomiting, diarrhea.    he has no c/o  Past Medical Hx:   HTN (hypertension)  Emphysematous COPD    Past Sx:    Allergies:  No Known Allergies    Current Meds:   Standng Meds:  ALBUTerol/ipratropium for Nebulization 3 milliLiter(s) Nebulizer every 6 hours  cefTRIAXone   IVPB 1000 milliGRAM(s) IV Intermittent every 24 hours  chlorhexidine 2% Cloths 1 Application(s) Topical <User Schedule>  chlorhexidine 4% Liquid 1 Application(s) Topical <User Schedule>  dextrose 5% + sodium chloride 0.45%. 1000 milliLiter(s) (75 mL/Hr) IV Continuous <Continuous>  enoxaparin Injectable 40 milliGRAM(s) SubCutaneous daily  pantoprazole    Tablet 40 milliGRAM(s) Oral before breakfast  tamsulosin 0.4 milliGRAM(s) Oral at bedtime    PRN Meds:    HOME MEDICATIONS:  acetaminophen 325 mg oral tablet: 2 tab(s) orally every 6 hours, As needed, Mild Pain (1 - 3)  tamsulosin 0.4 mg oral capsule: 1 cap(s) orally once a day (at bedtime)      Vital Signs:   T(F): 97.2 (10-11-19 @ 05:06), Max: 98.5 (10-10-19 @ 13:34)  HR: 84 (10-11-19 @ 05:06) (84 - 89)  BP: 116/57 (10-11-19 @ 05:06) (99/51 - 116/57)  RR: 18 (10-11-19 @ 05:06) (18 - 18)  SpO2: 95% (10-11-19 @ 06:30) (95% - 95%)      10-10-19 @ 07:01  -  10-11-19 @ 07:00  --------------------------------------------------------  IN: 0 mL / OUT: 280 mL / NET: -280 mL        Physical Exam:   General: Patient lying in bed, cachectic/emaciated, NAD  HEENT: NCAT, conjunctiva clear, sclera white, PEERLA, EOMI, moist mucous membranes, normal orophaynx  Neck: No masses, no JVD, no cervical lymphadenopathy  Respiratory: lungs clear to auscultation bilaterally  CV: Normal rate, regular rhythm, normal S1/S2, no rubs, gallops, murmurs  GI: abdomen soft, non-tender, non-distended, no masses, normal bowel sounds  Extremities: Well-perfused, no clubbing, no cyanosis, no lower extremity edema bilaterally  Skin: warm and dry  Neurology: AAOx1, confused, nonfocal    Labs:                         10.3   12.22 )-----------( 157      ( 10 Oct 2019 07:28 )             31.7       10 Oct 2019 07:28    146    |  108    |  17     ----------------------------<  116    3.6     |  25     |  0.7      Ca    8.7        10 Oct 2019 07:28  Mg     2.0       10 Oct 2019 07:28    TPro  6.4    /  Alb  3.1    /  TBili  0.3    /  DBili  x      /  AST  21     /  ALT  6      /  AlkPhos  47     10 Oct 2019 07:28              Troponin <0.01, CKMB --, CK -- 10-09-19 @ 20:20        Urinalysis Basic - ( 09 Oct 2019 22:18 )    Color: Yellow / Appearance: Clear / S.022 / pH: x  Gluc: x / Ketone: Negative  / Bili: Negative / Urobili: <2 mg/dL   Blood: x / Protein: 30 mg/dL / Nitrite: Negative   Leuk Esterase: Negative / RBC: 28 /HPF / WBC 10 /HPF   Sq Epi: x / Non Sq Epi: 2 /HPF / Bacteria: Negative          Culture - Blood (collected 10-09-19 @ 20:20)  Source: .Blood Blood  Preliminary Report (10-11-19 @ 02:01):    No growth to date.    Culture - Blood (collected 10-09-19 @ 20:20)  Source: .Blood Blood  Preliminary Report (10-11-19 @ 02:01):    No growth to date.

## 2019-10-11 NOTE — DIETITIAN INITIAL EVALUATION ADULT. - MALNUTRITION
severe malnutrition in setting of social/environmental circumstances r/t worsening dysphagia and declining appetite AEB <50% energy intake x >1 month, >7.5% unintentional wt loss [id number], severe muscle wasting (temporal, clavicle and shoulder regions) and severe subcutaneous fat loss (orbital and tricep regions),

## 2019-10-11 NOTE — DIETITIAN INITIAL EVALUATION ADULT. - REASON INDICATOR FOR ASSESSMENT
malnutrition suspected on RD Screen in pt with FTT. see RD diagnosis malnutrition suspected on RD Screen. pt meets severe criteria, see RD diagnosis.   Nutrition services assessment/ TPN/PPN c/s placed, however, not indicated as pt able to tolerate PO intake and no GI indications for intolerance to enteral nutrition if needed.

## 2019-10-11 NOTE — DIETITIAN INITIAL EVALUATION ADULT. - ENERGY NEEDS
estimated calorie needs = ~6992-8997 kcal/day (30-35 kcal/kg CBW to promote wt gain/maintenance in severely malnourished pt).  estimated protein needs = 50-63 g/day (1.2-1.5 g/kg CBW, reason mentioned above).  estimated fluid needs = 1mL/kcal or per LIP

## 2019-10-11 NOTE — DIETITIAN INITIAL EVALUATION ADULT. - DIET TYPE
2. Add Ensure Enlive daily, Beneprotein TID and Prosource Gelatin daily. 3. Consider appetite stimulant medication if not medically contraindicated. will assess calorie count results in 3 days./dysphagia 1, pureed, thin liquids

## 2019-10-11 NOTE — DIETITIAN INITIAL EVALUATION ADULT. - ENERGY INTAKE
Calorie count hung today, results to follow. Pt with <50% intake at breakfast today. Extensive discussion with son regarding pureed meals and nutrition supplements to optimize intake. Delivered trial of Ensure and Beneprotein to bedside. Pt agreeable to recs and recs d/w LIP Poor (<50%)

## 2019-10-11 NOTE — DIETITIAN INITIAL EVALUATION ADULT. - ADD RECOMMEND
RD will monitor diet order, energy intake, nutrition related labs, body composition, NFPF (PO tolerance, muscle wasting, fat loss)

## 2019-10-11 NOTE — DIETITIAN INITIAL EVALUATION ADULT. - FEEDING SKILL
Hx obtained from pt son at bedside. Reports worsening dysphagia "over last month or so". Pt has been consuming pureed meals at home but intake <50% x1-2 months. Pt mainly eating oatmeal or soups but not much caloric density as per son. NKFA. Denies use of supplements.

## 2019-10-11 NOTE — SWALLOW BEDSIDE ASSESSMENT ADULT - SLP PERTINENT HISTORY OF CURRENT PROBLEM
Pt admitted with failure to thrive, UTI. Pt reportedly having difficulty swallowing. Recent admit for PNA. PMHx: COPD, HTN. Known to SLP dept 8/2019 VES on IPR recs for puree and thin liquids multiple swallows, alternate bite and sip
Pt admitted with failure to thrive, UTI. Pt reportedly having difficulty swallowing. Recent admit for PNA. PMHx: COPD, HTN. KNown to SLP dpet 7/2019 recs for soft and thin liquids

## 2019-10-11 NOTE — CHART NOTE - NSCHARTNOTEFT_GEN_A_CORE
Upon Nutritional Assessment by the Registered Dietitian your patient was determined to meet criteria / has evidence of the following diagnosis/diagnoses:          [ ]  Mild Protein Calorie Malnutrition        [ ]  Moderate Protein Calorie Malnutrition        [x] Severe Protein Calorie Malnutrition in setting of social/environmental circumstances r/t worsening dysphagia and declining appetite        [ ] Unspecified Protein Calorie Malnutrition        [x] Underweight / BMI <19, BMI 15         [ ] Morbid Obesity / BMI > 40    Findings as based on:  •  Comprehensive nutrition assessment   •  Nutrition focused physical exam   •  Food and nutrition related history     Severe PCM Indicators:  1. <50% energy intake x >1 month.   2. >7.5% unintentional wt loss [id number].  3. severe muscle wasting (temporal, clavicle and shoulder regions).  4. severe subcutaneous fat loss (orbital and tricep regions).    Treatment:   The following diet has been recommended:  1. Diet order per SLP.  2. Add Ensure Enlive daily, Beneprotein TID and Prosource Gelatin daily.   3. Consider appetite stimulant medication if not medically contraindicated. will assess calorie count results in 3 days    PROVIDER Section:     By signing this assessment you are acknowledging and agree with the diagnosis/diagnoses assigned by the Registered Dietitian    Comments:

## 2019-10-11 NOTE — SWALLOW BEDSIDE ASSESSMENT ADULT - COMMENTS
SLP discussed with pts son re: VES results 8/2019, diet recommendations, swallow strategies, aspiration risks. Expressed understanding
Min overt s/s aspiration/penetration
+ toleration

## 2019-10-11 NOTE — DIETITIAN INITIAL EVALUATION ADULT. - PHYSICAL APPEARANCE
stated ht 66in, 93# BMI 15. EMR wt hx 105# at previous admit 07/29/2019. Indicates significant 12# (11.4%) unintentional wt loss over 3 months. pt denied full physical assessment but allowed RD to examine upper half of body. Observed severe muscle wasting (temporal, clavicle and shoulder regions) and severe subcutaneous fat loss (orbital and tricep regions), indicating malnutrition. no edema noted. skin otherwise intact.

## 2019-10-11 NOTE — PROGRESS NOTE ADULT - ASSESSMENT
79M with Past Medical History HTN and COPD (off supplemental oxygen), recently admitted to [facility] for pneumonia, was brought to ER to day by his son and family members for failure to thrive. Could not get history from the patient. As per the sign out and charts pt had difficulty swallowing/eating, cough, and has been very week. His symptoms were progressively getting worse so he was brought to ER by his son.    # Failure to thrive  - encourage PO intake  - likely UTI related, start Rocephin f/u urine culture  - PT/Rehab eval  - I/V fluids D5 + 1/2 Saline @ 75cc/hr    # Hypernatremia  - likely from decreased po intake  - f/u repeat BMP  - I/V fluids D5 + 1/2 Saline @ 75cc/hr    # HTN  - currently BP on lower side  - monitor BP    # COPD   - stable  - duonebs prn    # Elevated WBCs likely from UTI  - f/u pan cx, CXR  - c/w Rocephin for possible UTI., culture f/u    DVT PPx: Lovenox 40 mg s/c  GI PPX: Protonix 40 mg PO   Diet: Regular  Activity: Increase as tolerated  Dispo: from home, will likely need placement  Code Status: full code 79M with Past Medical History HTN and COPD (off supplemental oxygen), recently admitted to [facility] for pneumonia, was brought to ER to day by his son and family members for failure to thrive. Could not get history from the patient. As per the sign out and charts pt had difficulty swallowing/eating, cough, and has been very week. His symptoms were progressively getting worse so he was brought to ER by his son.    # Failure to thrive  - seen by speech and swallow: recommend dysphagia 1 diet  - evaluated by dietician: added beneprotein, prosource gelatin, and ensure supplements  - calorie count  - Blood cultures: NGTD  - Urine cultures: negative  - PT/Rehab eval  - I/V fluids D5 + 1/2 Saline @ 75cc/hr    # Leukocytosis - resolved  - cultures negative  - no PNA on CXR    # Metabolic encephalopathy  - Per son, patient is now mentating closer to baseline  - no clear source of infection  - CTH negative  - Vit B12, folate, TSH, RPR pending    # Hypernatremia - resolved  - likely from decreased po intake  - improved with fluids    # Hypokalemia  - replete as needed    # HTN  - currently BP on lower side  - monitor BP    # COPD   - stable  - duonebs prn    DVT PPx: Lovenox 40 mg s/c  GI PPX: Protonix 40 mg PO   Diet: Regular  Activity: Increase as tolerated  Dispo: from home, will likely need placement  Code Status: full code 79M with Past Medical History HTN and COPD (off supplemental oxygen), recently admitted to [facility] for pneumonia, was brought to ER to day by his son and family members for failure to thrive. Could not get history from the patient. As per the sign out and charts pt had difficulty swallowing/eating, cough, and has been very week. His symptoms were progressively getting worse so he was brought to ER by his son.    # Failure to thrive  - seen by speech and swallow: recommend dysphagia 1 diet  - evaluated by dietician: added beneprotein, prosource gelatin, and ensure supplements  - calorie count  - Blood cultures: NGTD  - Urine cultures: negative  - PT/Rehab eval  - I/V fluids D5 + 1/2 Saline @ 75cc/hr    # Leukocytosis - resolved  - cultures negative  - no PNA on CXR    # Metabolic encephalopathy  - Per son, patient is now mentating closer to baseline  - no clear source of infection  - CTH negative  - Vit B12, folate, TSH, RPR pending    # Hypernatremia - resolved  - likely from decreased po intake  - improved with fluids    # Hypokalemia  - replete as needed    # HTN  - currently BP on lower side  - monitor BP    # COPD   - stable  - duonebs prn    DVT PPx: Lovenox 40 mg s/c  GI PPX: Protonix 40 mg PO   Diet: Regular  Activity: Increase as tolerated  Dispo: from home, will likely need placement  Code Status: full code    basically the reason why pt is here is because the son refuses to take care of his father  So, if indeed he doesn't want him home than placement to a SNF is warranted.  No indication for hospitalization at all

## 2019-10-11 NOTE — DIETITIAN INITIAL EVALUATION ADULT. - OTHER INFO
Pt with primary dx FTT. As per MD progress note, poor PO intake likely d/t UTI, f/u urine culture. Hypernatremia likely 2/2 poor PO intake, IV fluids in place. hx COPD.

## 2019-10-12 NOTE — DISCHARGE NOTE PROVIDER - NSDCCPCAREPLAN_GEN_ALL_CORE_FT
PRINCIPAL DISCHARGE DIAGNOSIS  Diagnosis: Failure to thrive in adult  Assessment and Plan of Treatment: There is no active infection or acute illness that warrants hospitalization at this time. Please follow the recommendations of the dietician to help with swallowing and maintaining adequate caloric intake. Please follow up with your PCP for further management.      SECONDARY DISCHARGE DIAGNOSES  Diagnosis: Chronic obstructive pulmonary disease  Assessment and Plan of Treatment: Chronic obstructive pulmonary disease (COPD) is a lung condition in which airflow from the lungs is limited. Causes include smoking, secondhand smoke exposure, genetics, or recurrent infections. Take all medicines (inhaled or pills) as directed by your health care provider. Avoid exposure to irritants such as smoke, chemicals, and fumes that aggravate your breathing.  If you are a smoker, the most important thing that you can do is stop smoking. Continuing to smoke will cause further lung damage and breathing trouble. Ask your health care provider for help with quitting smoking.    Diagnosis: HTN (hypertension)  Assessment and Plan of Treatment: Hypertension, commonly called high blood pressure, is when the force of blood pumping through your arteries is too strong. Hypertension forces your heart to work harder to pump blood. Your arteries may become narrow or stiff. Having untreated or uncontrolled hypertension for a long period of time can cause heart attack, stroke, kidney disease, and other problems. If started on a medication, take exactly as prescribed by your health care professional. Maintain a healthy lifestyle and follow up with your primary care physician.

## 2019-10-12 NOTE — DISCHARGE NOTE PROVIDER - CARE PROVIDER_API CALL
epigastric area Derek Jordan (MD)  Medicine  [address] [place] [address]  Phone: [phone]  Fax: [phone]  Follow Up Time: 1 week

## 2019-10-12 NOTE — DISCHARGE NOTE NURSING/CASE MANAGEMENT/SOCIAL WORK - PATIENT PORTAL LINK FT
You can access the FollowMyHealth Patient Portal offered by [facility] by registering at the following website: [email or web address]. By joining [facility]’s FollowMyHealth portal, you will also be able to view your health information using other applications (apps) compatible with our system.

## 2019-10-12 NOTE — DISCHARGE NOTE PROVIDER - HOSPITAL COURSE
79M with Past Medical History HTN and COPD (off supplemental oxygen), recently admitted to [facility] for pneumonia, was brought to ER to day by his son and family members for failure to thrive. Could not get history from the patient. He is AAO X 1, tried calling son left messages he did not get back to me. As per the sign out and charts pt had difficulty swallowing/eating, cough, and has been very week. His symptoms were progressively getting worse so he was brought to ER by his son. Pt did endorse urinary burning and some discomfort while urinating. Sepsis work up was negative. TSH, folate, b12, and CTH were all WNL. The patient was found to be malnourished and in need of pureed diet due to difficulty swallowing. Patient is stable and has been cleared for discharge with outpatient follow up

## 2019-10-12 NOTE — DISCHARGE NOTE PROVIDER - INSTRUCTIONS
Because of difficulty swallowing, you will need to follow a pureed diet. “Pureed” means that all food has been ground, pressed, and/or strained to a soft, smooth consistency, like a pudding. To help maintain adequate calorie and nutrient intake, please drink Ensure Enlive daily, add Beneprotein to meals 3 times per day, and Prosource Gelatin daily.

## 2019-10-31 NOTE — ED PROVIDER NOTE - ATTENDING CONTRIBUTION TO CARE
80 yo male PMH as noted brought by family for evaluation of generalized weakness, decreased PO intake and cough for the past several days.  Today he was unable to stand due to weakness and was less talkative than his baseline.  According to family patient has been slowly declining since August, was admitted to the hospital earlier this month for failure to thrive.  Elderly male, resting on a stretcher appears week, very thin; head AT/NC, awake, follows directions appropriately, no acute respiratory distress, inspiratory crackles at the left base, RRR, abdomen soft, NT/ND, no leg edema or calf ttp. Will hydrate, check labs, get CXR, abx for suspected pneumonia, admit.

## 2019-10-31 NOTE — ED PROVIDER NOTE - PHYSICAL EXAMINATION
CONSTITUTIONAL: cachectic appearing, nontoxic appearing, in no acute distress, speaking in full sentences  SKIN: warm, dry, no rash, cap refill < 2 seconds  HEENT: normocephalic, atraumatic, no conjunctival erythema, dry mucous membranes, patent airway  NECK: supple, no masses  CV:  regular rate, regular rhythm, 2+ radial pulses bilaterally  RESP: no wheezes, bilateral crackles, no tachypnea, normal work of breathing  ABD: soft, nontender, nondistended, no rebound, no guarding  MSK: normal ROM, no cyanosis, no edema  NEURO: alert, oriented, grossly unremarkable  PSYCH: cooperative, appropriate

## 2019-10-31 NOTE — ED PROVIDER NOTE - NS ED ROS FT
GEN:  no fever, no chills, + fatigue  NEURO:  no headache, no dizziness  ENT: no sore throat, no runny nose  CV:  + chest pain, + SOB  RESP:  + dyspnea, + cough  GI:  no nausea, no vomiting, no abdominal pain, no diarrhea, no constipation  :  no dysuria, no urinary frequency, no hematuria  MSK:  no joint pain, no edema  SKIN:  no rash, no bruising  HEME: no hematochezia, no melena

## 2019-10-31 NOTE — ED ADULT NURSE NOTE - NSIMPLEMENTINTERV_GEN_ALL_ED
Implemented All Fall Risk Interventions:  [place] to call system. Call bell, personal items and telephone within reach. Instruct patient to call for assistance. Room bathroom lighting operational. Non-slip footwear when patient is off stretcher. Physically safe environment: no spills, clutter or unnecessary equipment. Stretcher in lowest position, wheels locked, appropriate side rails in place. Provide visual cue, wrist band, yellow gown, etc. Monitor gait and stability. Monitor for mental status changes and reorient to person, place, and time. Review medications for side effects contributing to fall risk. Reinforce activity limits and safety measures with patient and family.

## 2019-10-31 NOTE — ED PROVIDER NOTE - PROGRESS NOTE DETAILS
TC: TC: Febrile 102.4. Labs notable for WBC 14. Cxr with increase in L sided opacity. Ordered cefepime, vanc for HCAP. Will admit. TC: 80 yo M with PMHx of COPD (never been on home O2) who presents with productive cough, decreased appetite/UOP, generalized weakness. Recently admitted for failure to thrive. Febrile here 102.4. Bilateral crackles. Ordered sepsis labs, ekg, ua, urine cx. Given IVF, tylenol. TC: Sepsis suspected at this time. TC: Spoke with Dr. Jordan, agreeable with plan to admit. According to family patient appears better with fluids,, started talking a lot now. TC: Initial lactate 2.3, repeat 1.6. Flu swab sent out, inpatient team to f/u.

## 2019-10-31 NOTE — ED PROCEDURE NOTE - CPROC ED INDICATIONS1
-s/p L frontotemporal craniotomy and excision of meningioma on 6/7/17     · Monitor sleep disturbances and establish consistent sleep-wake cycle (lights on and shades open during day and lights dim/off at night).   · Promote environmental modifications to limit agitation and confusion (appropriate lighting, family at bedside, visible clock and calendar, updated white board, reduce noise, limited visitors, clustered nursing care).  · Monitor for bowel and bladder dysfunction.   · Encourage mobility, OOB in chair at least 3 hours per day, and ambulation.  · Reorient patient to person, place, time, and situation on each encounter.   · PT/OT evaluate and treat.  · SLP speech and cognitive evaluate and treat.  · If possible, avoid restraints.  May benefit from 24/7 supervision by a sitter.   · Avoid/limit medications that may worsen delirium.  Such as benzodiazepines, antihistamines, anticholinergics, hypnotics, and opiates.           emergency venous access/antibiotic therapy/fluid administration

## 2019-10-31 NOTE — ED PROVIDER NOTE - CLINICAL SUMMARY MEDICAL DECISION MAKING FREE TEXT BOX
78 yo male with pneumonia and decreased PO intake; abx for HCAP started, given IVF with improvement in mental status, admit to medicine for further treatment.

## 2019-10-31 NOTE — ED PROVIDER NOTE - OBJECTIVE STATEMENT
78 yo M with PMHx of COPD (never been on home O2) who presents with productive cough and failure to thrive. Per family, pt has been having productive cough x 2 wks associated with intermittent, nonradiating, mild, midsternal chest pressure and sob with coughing, none at rest. Also with failure to thrive, lost 50 lbs in past 6 mo. He was admitted 7/28-8/1 for CAP and again 10/9-120/12 for failure to thrive. Has been on pureed diet for dysphagia for 3 mo. 80 yo M with PMHx of COPD (never been on home O2) who presents with productive cough and failure to thrive. Per family, pt has been having productive cough x 2 wks associated with intermittent, nonradiating, mild, midsternal chest pressure and sob with coughing, none at rest. Also with failure to thrive, lost 50 lbs in past 6 mo. He was admitted 7/28-8/1 for CAP and again 10/9-10/12 for failure to thrive. Has been on pureed diet for dysphagia for 3 mo but has not been eating for past several days and with decreased UOP. 80 yo M with PMHx of COPD (never been on home O2) who presents with productive cough and failure to thrive. Per family, pt has been having productive cough x 2 wks associated with intermittent, nonradiating, mild, midsternal chest pressure and sob with coughing, none at rest. Also with failure to thrive, lost 50 lbs in past 6 mo. He was admitted 7/28-8/1 for CAP and again 10/9-10/12 for failure to thrive. Has been on pureed diet for dysphagia for 3 mo but has not been eating for past several days and with decreased UOP. A&Ox3 at baseline, ambulates to bathroom on own but for past 3 days has been requiring assistance with bathroom/feeding. No fever, nausea, vomiting, abd pain, diarrhea, dysuria.

## 2019-10-31 NOTE — ED ADULT NURSE NOTE - NSSUHOSCREENINGYN_ED_ALL_ED
DATE:      CONSULTANT:  Ceasar Mcpherson M.D.      HISTORY OF PRESENT ILLNESS:  Today, the patient is clinically stable and has no   specific complaints.  Our plan for today is to place a midline PICC for venous   access.  She has received platelets and were hoping to coordinate with IR to   place a line soon after completion of her platelet transfusion, which she did   tolerate well this morning.      She reports her mouth is feeling somewhat better.  She is able to eat.      Her vital signs revealed a temperature is 36.4 with heart rate 66, respirations   20, O2 saturation 97%, and blood pressure 122/70.      Inspection of her oropharynx reveals a few petechiae.  Inspection of her lower   extremities also revealed petechiae as well as ecchymosis.      She has no palpable lymphadenopathy in her cervical or axillary regions.   Auscultation of her lungs revealed no rales, rhonchi, or wheezing.      Auscultation of her heart revealed no distinct murmurs nor extra sounds.      Abdomen is soft and nontender.  There were no palpable masses and no   organomegaly appreciated.  She appears to be neurologically intact.      Laboratory data today reveals a white count of 10.4, a hemoglobin of 8, and   platelets of 8.  She has 30% blasts in the peripheral blood.  Her biochemical   parameters are unremarkable except of the fact that her rasburicase uric acid   today is 1.3, down from 11.2.  We are trying to secure her clofarabine, so we   can administer it today, unfortunately the drug may not be available until   tomorrow.         _____________________               ____________________________________   DATE AND TIME                       Ceasar Mcpherson M.D.         RNS/MEDQ-#[id number]   DD:  07/19/2017 12:39:28      DT:  07/19/2017 13:02:51      cc:   Darrian Villagomez M.D.            [facility]      CONSULTATION               WENDY TALLEY   MRN#: [id number]   ROOM: [address] [id number]   ACCT#:  [id number]      No - the patient is unable to be screened due to medical condition

## 2019-11-01 NOTE — SWALLOW BEDSIDE ASSESSMENT ADULT - ASR SWALLOW ASPIRATION MONITOR
fever/throat clearing/change of breathing pattern/position upright (90Y)/oral hygiene/gurgly voice/cough

## 2019-11-01 NOTE — CONSULT NOTE ADULT - ASSESSMENT
79 year old male with PMHx of COPD, Interstitial lung disease, and BPH, presenting due to cough of 1 week duration, admitted for acute URI.    # Progressive oropharyngeal dysphagia to solids and liquids x 3 months' duration possibly secondary to Parkinson's  Per wife there appears to be component of dementia which could be secondary to Parkinson's  MBS on prior admission 8/2019 revealed high risk for aspiration      ** Note incomplete; pending attending's revisions/additions. 79 year old male with PMHx of COPD, Interstitial lung disease, and BPH, presenting due to cough of 1 week duration, admitted for acute URI.    # Progressive oropharyngeal dysphagia to solids and liquids x 3 months' duration possibly secondary to Parkinson's  Per wife there appears to be component of dementia which could be secondary to Parkinson's  MBS on prior admission 8/2019 revealed high risk for aspiration  Unlikely to be structural deficit requiring EGD.  Speech/Swallow for recommended diet; if patient cannot tolerate or tolerates in only small amounts, can consider PEG. 79 year old male with PMHx of COPD, Interstitial lung disease, and BPH, presenting due to cough of 1 week duration, admitted for acute URI.    # Progressive oropharyngeal dysphagia to solids and liquids x 3 months' duration  associated with weight loff  Per wife there appears to be component of dementia    MBS on prior admission 8/2019 revealed high risk for aspiration  Based on the described oropharyngeal type symptoms as well as the prior speech pathology assessment/recommendation, doubt that EGD will be useful  Speech pathology consult assement/recommended diet (probably unchanged from last hospitalization); if patient cannot tolerate or tolerates in only small amounts, can consider PEG (discussed with family who are unsure if they would consent).  recall if patient cannot eat enough to sustain himself.

## 2019-11-01 NOTE — H&P ADULT - NSHPLABSRESULTS_GEN_ALL_CORE
10.8   16.80 )-----------( 245      ( 31 Oct 2019 20:42 )             33.5           10-31    139  |  98  |  25<H>  ----------------------------<  125<H>  5.2<H>   |  25  |  0.9    Ca    9.1      31 Oct 2019 20:42    TPro  8.1<H>  /  Alb  3.2<L>  /  TBili  0.8  /  DBili  x   /  AST  57<H>  /  ALT  22  /  AlkPhos  60  10-31        < from: 12 Lead ECG (10.31.19 @ 22:04) >      Ventricular Rate 117 BPM    Atrial Rate 117 BPM    P-R Interval 158 ms    QRS Duration 80 ms    Q-T Interval 302 ms    QTC Calculation(Bezet) 421 ms    P Axis 64 degrees    R Axis 15 degrees    T Axis 55 degrees    Diagnosis Line Sinus tachycardia with occasional Premature ventricular complexes  Voltage criteria for left ventricular hypertrophy  Abnormal ECG    Confirmed by Dima Barney ([id number]) on 10/31/2019 11:06:56 PM    < end of copied text >

## 2019-11-01 NOTE — SWALLOW BEDSIDE ASSESSMENT ADULT - COMMENTS
SLP contacted pts abeba Somers to assist w/ translation as pt cannot use  phone 2' baseline cognitive deficits.

## 2019-11-01 NOTE — CONSULT NOTE ADULT - ASSESSMENT
IMPRESSION: Rehab of Debilitation     PRECAUTIONS: [   x ] Cardiac  [   x ] Respiratory  [    ] Seizures [    ] Contact Isolation  [    ] Droplet Isolation  [    ] Other    Weight Bearing Status:     RECOMMENDATION:  SPEECH REEVAL ?TOLERATING PUREE                                   ? CANDIDATE FOR PEG FOR NUTRITIONAL SUPPORT    Out of Bed to Chair     DVT/Decubiti Prophylaxis    REHAB PLAN:     [    x ] Bedside P/T 3-5 times a week   [     ] Bedside O/T  2-3 times a week   [     ] No Rehab Therapy Indicated   [     ]  Speech Therapy   Conditioning/ROM                                 ADL  Bed Mobility                                            Conditioning/ROM  Transfers                                                  Bed Mobility  Sitting /Standing Balance                      Transfers                                        Gait Training                                            Sitting/Standing Balance  Stair Training [   ]Applicable                 Home equipment Eval                                                                     Splinting  [   ] Only      GOALS:   ADL   [  x  ]   Independent         Transfers  [   x ] Independent            Ambulation  [ x    ] Independent     [    x ] With device                            [    ]  CG                                               [    ]  CG                                                    [     ] CG                            [    ] Min A                                          [    ] Min A                                                [     ] Min  A          DISCHARGE PLAN:   [     ]  Good candidate for Intensive Rehabilitation/Hospital based                                             Will tolerate 3hrs Intensive Rehab Daily                                       [    x  ]  Short Term Rehab in Skilled Nursing Facility                               VS                                     [    x  ]  Home with Outpatient or VN services                                         [      ]  Possible Candidate for Intensive Hospital based Rehab

## 2019-11-01 NOTE — H&P ADULT - NSHPPHYSICALEXAM_GEN_ALL_CORE
PHYSICAL EXAM:T(C): 36.9 (11-01-19 @ 00:25), Max: 39.1 (10-31-19 @ 20:49)  HR: 99 (11-01-19 @ 00:25) (75 - 108)  BP: 100/54 (11-01-19 @ 00:25) (94/54 - 120/69)  RR: 22 (11-01-19 @ 00:25) (22 - 22)  SpO2: 98% (11-01-19 @ 00:25) (95% - 98%)  GENERAL: NAD, well-developed  HEAD:  Atraumatic, Normocephalic  EYES: EOMI, PERRLA, conjunctiva and sclera clear  NECK: Supple, No JVD  CHEST/LUNG: No wheezing appreciated, good bilateral air entry  HEART: Regular rate and rhythm; No murmurs, rubs, or gallops  ABDOMEN: Soft, Nontender, Nondistended; Bowel sounds present  EXTREMITIES:  2+ Peripheral Pulses, No clubbing, cyanosis, or edema  PSYCH: AAOx3, but poor historian  NEUROLOGY: non-focal  SKIN: No rashes or lesions

## 2019-11-01 NOTE — SWALLOW BEDSIDE ASSESSMENT ADULT - SWALLOW EVAL: RECOMMENDED FEEDING/EATING TECHNIQUES
alternate food with liquid/multiple swallows (minimum of 3 swallows for every bite+sip)/crush medication (when feasible)/allow for swallow between intakes/position upright (90 degrees)/small sips/bites/oral hygiene

## 2019-11-01 NOTE — SWALLOW BEDSIDE ASSESSMENT ADULT - SWALLOW EVAL: PATIENT/FAMILY GOALS STATEMENT
as per pts son Lizbet, family does not want feeding tube- wishes to proceed with PO diet knowing and understanding risks of aspiration.

## 2019-11-01 NOTE — H&P ADULT - ASSESSMENT
79 year old male with PMHx of COPD, Interstitial lung disease, BPH presenting due to cough of 1 week duration.     #Cough, fever, likely acute viral Bronchitis  CXR with no acute findings  S/o cefepime and Levaquin in ED  Lactate 2.3--->1.6 with hydration  troponin 0.02, f/u repeat  Will hold off on any further antibiotics considering no CXR findings  Flu, RSV negative. Rapid viral panel pending  Supportive measures, Tylenol PRN   S/p 3.5 L Lactated Ringer's in ED, will hold off on further fluids pending AM labs    #COPD, with ? Intersitial lung disease  CXR showing evidence of disease progression  Currently not wheezing, not in exacerbation  Duonebs q6 PRN    #Urinary incontinence  S/p Martinez placement in ED  trial of void in AM    #DVT PPX: Lovenox  #GI PPX: Not indicated  #Activity: With assistance  #Diet: Dysphagia 1 per previous chart  #Dispo: From home, lives with son  #Full code 79 year old male with PMHx of COPD, Interstitial lung disease, BPH presenting due to cough of 1 week duration.     #Cough, fever, likely acute viral Bronchitis  CXR with no acute findings  S/o cefepime and Levaquin in ED  Lactate 2.3--->1.6 with hydration  troponin 0.02, f/u repeat  Will hold off on any further antibiotics considering no CXR findings  Flu, RSV negative. Rapid viral panel pending  Supportive measures, Tylenol PRN   S/p 3.5 L Lactated Ringer's in ED, will hold off on further fluids pending AM labs    #COPD, with ? Intersitial lung disease  CXR showing evidence of disease progression  Currently not wheezing, not in exacerbation  Duonebs q6 PRN    #Urinary incontinence  S/p Martinez placement in ED  trial of void in AM    #Failure to thrive  Seen by S&S on previous admission: Dysphagia 1  Will get PT/rehab  From previous admission, TSH and B12 wnl, Folate mildly reduced    #Folic acid deficiency  On Folic acid supplement     #DVT PPX: Lovenox  #GI PPX: Not indicated  #Activity: With assistance  #Diet: Dysphagia 1, thins  #Dispo: From home, lives with son  #Full code

## 2019-11-01 NOTE — CONSULT NOTE ADULT - SUBJECTIVE AND OBJECTIVE BOX
Patient is a 79y old  Male who presents with a chief complaint of Cough (2019 14:07)    HPI:  79 year old male with PMHx of COPD, Interstitial lung disease, BPH presenting due to cough of 1 week duration. Patient has been coughing, productive of green sputum. Over the last 3 days patient has been having decreased PO intake, felt feverish and decreased ability to ambulate. At baseline he is able to ambulate to bathroom on his own.  In ED patient was febrile 102.4.   Patient A0*3 but poor historian, contacted son via phone  Positive sick contact, son.  Patient with 2 previous hospitalizations for same complaint and failure to thrive. Lost 50lbs in 6 months. Was discharged on pureed diet 10/12/19. (2019 01:25)      PAST MEDICAL & SURGICAL HISTORY:  Emphysematous COPD  No significant past surgical history      Hospital Course: Cough, fever, likely acute viral Bronchitis  CXR with no acute findings  S/o cefepime and Levaquin in ED  Lactate 2.3--->1.6 with hydration  troponin 0.02, f/u repeat  Will hold off on any further antibiotics considering no CXR findings  Flu, RSV negative. Rapid viral panel pending  Supportive measures, Tylenol PRN   S/p 3.5 L Lactated Ringer's in ED, will hold off on further fluids pending AM labs    #COPD, with ? Intersitial lung disease  CXR showing evidence of disease progression  Currently not wheezing, not in exacerbation  Duonebs q6 PRN    #Urinary incontinence  S/p Martinez placement in ED  trial of void in AM    #Failure to thrive  Seen by S&S on previous admission: Dysphagia 1  Will get PT/rehab  From previous admission, TSH and B12 wnl, Folate mildly reduced    #Folic acid deficiency  On Folic acid supplement     #DVT PPX: Lovenox  #GI PPX: Not indicated  #Activity: With assistance  #Diet: Dysphagia 1, thins    TODAY'S SUBJECTIVE & REVIEW OF SYMPTOMS:     Constitutional Weakness   Cardio WNL   Resp WNL   GI WNL  Heme WNL  Endo WNL  Skin WNL  MSK WNL  Neuro WNL  Cognitive WNL  Psych WNL      MEDICATIONS  (STANDING):  cefTRIAXone   IVPB 1000 milliGRAM(s) IV Intermittent every 24 hours  chlorhexidine 4% Liquid 1 Application(s) Topical <User Schedule>  doxycycline IVPB      doxycycline IVPB 100 milliGRAM(s) IV Intermittent once  enoxaparin Injectable 40 milliGRAM(s) SubCutaneous at bedtime  folic acid 1 milliGRAM(s) Oral daily  influenza   Vaccine 0.5 milliLiter(s) IntraMuscular once  mupirocin 2% Nasal 1 Application(s) Nasal two times a day  tamsulosin 0.4 milliGRAM(s) Oral at bedtime    MEDICATIONS  (PRN):  acetaminophen  Suppository .. 650 milliGRAM(s) Rectal every 6 hours PRN Temp greater or equal to 38C (100.4F)  albuterol/ipratropium for Nebulization 3 milliLiter(s) Nebulizer every 6 hours PRN Shortness of Breath and/or Wheezing      FAMILY HISTORY:  FHx: pneumonia      Allergies    No Known Allergies    Intolerances        SOCIAL HISTORY:    [    ] Etoh  [    ] Smoking  [    ] Substance abuse     Home Environment:  [    ] Home Alone  [  x  ] Lives with Family SON  [    ] Home Health Aid    Dwelling:  [  x  ] Apartment  [    ] Private House  [    ] Adult Home  [    ] Skilled Nursing Facility      [    ] Short Term  [    ] Long Term  [    ] Stairs                           [    ] Elevator     FUNCTIONAL STATUS PTA: (Check all that apply)  Ambulation: [  x   ]Independent    [    ] Dependent     [    ] Non-Ambulatory  Assistive Device: [    ] SA Cane  [    ]  Q Cane  [    ] Walker  [    ]  Wheelchair  ADL : [  x  ] Independent  [    ]  Dependent       Vital Signs Last 24 Hrs  T(C): 39.8 (2019 14:59), Max: 39.8 (2019 14:59)  T(F): 103.6 (2019 14:59), Max: 103.6 (2019 14:59)  HR: 88 (2019 08:03) (75 - 108)  BP: 99/59 (2019 08:03) (94/54 - 120/69)  BP(mean): --  RR: 18 (2019 08:03) (18 - 22)  SpO2: 96% (2019 08:03) (95% - 98%)      PHYSICAL EXAM: Alert & Oriented X2 confused as per family +shaking chills  GENERAL: NAD, well-groomed, well-developed  HEAD:  Atraumatic, Normocephalic  EYES: EOMI, PERRLA, conjunctiva and sclera clear  NECK: Supple  CHEST/LUNG: Clear bilaterally  HEART: Regular rate and rhythm  ABDOMEN: Soft, Nontender, Nondistended; Bowel sounds present  EXTREMITIES:  no calf tenderness,no edema BLES    NERVOUS SYSTEM:  Cranial Nerves 2-12 intact [ x   ] Abnormal  [    ]  ROM: WFL all extremities [    x]  Abnormal [     ]  Motor Strength: WFL all extremities  [    ]  Abnormal [x    ]3/5  Sensation: intact to light touch [ x   ] Abnormal [    ]      FUNCTIONAL STATUS:  Bed Mobility: [   ]  Independent [    ]  Supervision [ x   ]  Needs Assistance [  ]  N/A  Transfers: [    ]  Independent [    ]  Supervision [  x  ]  Needs Assistance [    ]  N/A    Ambulation:  [    ]  Independent [    ]  Supervision [    ]  Needs Assistance [x    ]  N/A   ADL:  [    ]   Independent [ x   ] Requires Assistance [    ] N/A       LABS:                        9.4    14.96 )-----------( 214      ( 2019 09:10 )             29.4     11    141  |  104  |  23<H>  ----------------------------<  100<H>  3.9   |  24  |  0.6<L>    Ca    8.8      2019 09:10    TPro  8.1<H>  /  Alb  3.2<L>  /  TBili  0.8  /  DBili  x   /  AST  57<H>  /  ALT  22  /  AlkPhos  60  10-31    PT/INR - ( 31 Oct 2019 20:42 )   PT: 16.10 sec;   INR: 1.40 ratio         PTT - ( 31 Oct 2019 20:42 )  PTT:25.5 sec  Urinalysis Basic - ( 2019 06:00 )    Color: Yellow / Appearance: Clear / S.028 / pH: x  Gluc: x / Ketone: Negative  / Bili: Negative / Urobili: <2 mg/dL   Blood: x / Protein: 100 mg/dL / Nitrite: Negative   Leuk Esterase: Negative / RBC: 24 /HPF / WBC 10 /HPF   Sq Epi: x / Non Sq Epi: 9 /HPF / Bacteria: Negative        RADIOLOGY & ADDITIONAL STUDIES:

## 2019-11-01 NOTE — H&P ADULT - HISTORY OF PRESENT ILLNESS
79 year old male with PMHx of COPD, Interstitial lung disease, BPH presenting due to cough of 1 week duration. Patient has been coughing, productive of green sputum. Over the last 3 days patient has been having decreased PO intake, felt feverish and decreased ability to ambulate. At baseline he is able to ambulate to bathroom on his own.  In ED patient was febrile 102.4.   Patient A0*3 but poor historian, contacted son via phone  Positive sick contact, son.  Patient with 2 previous hospitalizations for same complaint and failure to thrive. Lost 50lbs in 6 months. Was discharged on pureed diet 10/12/19.
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
## 2019-11-01 NOTE — SWALLOW BEDSIDE ASSESSMENT ADULT - SLP PERTINENT HISTORY OF CURRENT PROBLEM
pt admitted from home for cough, productive of green sputum; PMHx: COPD, Interstitial lung disease, BPH; Over the last 3 days patient has been having decreased PO intake, felt feverish and decreased ability to ambulate; +febrile in ED. pt being treated for cough and fever, as per chart likely acute viral bronchitis. CXR 10/31-> Chronic interstitial lung disease with superimposed left lower lobe infiltrates. pt WELL KNOWN to SLP dept during previous admissions, s/p MBS 8/7/2019 w/ recommendations for puree w/ thin liquids- severe pharyngeal dysphagia via multiple swallows and alternating bites/sips. pt remains high aspiration risk despite modified diet. while pt was on inpatient rehab, neurology w/u was recommended to further investigate dysphagia etiology.

## 2019-11-02 NOTE — PROGRESS NOTE ADULT - ASSESSMENT
79 year old male with PMHx of COPD, Interstitial lung disease, BPH presenting due to cough of 1 week duration.     #Cough, fever, likely acute viral Bronchitis vs Aspiration   CXR with no acute findings  S/o cefepime and Levaquin in ED  Lactate 2.3--->1.6 with hydration  c/w rocephin and doxy for now   consider switching to oral tomorrow   Flu, RSV negative. Rapid viral panel neg  MRSA PCR +   Supportive measures, Tylenol PRN     #COPD, with  Intersitial lung disease  CXR showing evidence of disease progression  Currently not wheezing, not in exacerbation  Duonebs q6 PRN    #Urinary incontinence  S/p Martinez placement in ED  trial of void in AM    #Failure to thrive  Seen by S&S on previous admission: Dysphagia 1  PT to see pt   Encourage PO intake   Replete lytes PRN     #Folic acid deficiency  On Folic acid supplement     #DVT PPX: Lovenox  #GI PPX: Not indicated  #Activity: With assistance  #Diet: Dysphagia 1, thins  #Dispo: From home, lives with son  #Full code    Son will have family meeting about PEG vs Comfort care

## 2019-11-02 NOTE — PROGRESS NOTE ADULT - SUBJECTIVE AND OBJECTIVE BOX
Hospital Day:  1d    Subjective:    Pt was interviewed and examined at the bedside in the AM. No acute events overnight.   Pt appears well in the AM. Indicates he feels okay, denies any complaints.      Past Medical Hx:   HTN (hypertension)  Emphysematous COPD    Past Sx:  No significant past surgical history    Allergies:  No Known Allergies    Current Meds:   Standng Meds:  cefTRIAXone   IVPB 1000 milliGRAM(s) IV Intermittent every 24 hours  chlorhexidine 4% Liquid 1 Application(s) Topical <User Schedule>  doxycycline IVPB      doxycycline IVPB 100 milliGRAM(s) IV Intermittent every 12 hours  enoxaparin Injectable 40 milliGRAM(s) SubCutaneous at bedtime  folic acid 1 milliGRAM(s) Oral daily  influenza   Vaccine 0.5 milliLiter(s) IntraMuscular once  mupirocin 2% Nasal 1 Application(s) Nasal two times a day  mupirocin 2% Ointment 1 Application(s) Topical once  tamsulosin 0.4 milliGRAM(s) Oral at bedtime    PRN Meds:  acetaminophen  Suppository .. 650 milliGRAM(s) Rectal every 6 hours PRN Temp greater or equal to 38C (100.4F)  albuterol/ipratropium for Nebulization 3 milliLiter(s) Nebulizer every 6 hours PRN Shortness of Breath and/or Wheezing    HOME MEDICATIONS:  tamsulosin 0.4 mg oral capsule: 1 cap(s) orally once a day      Vital Signs:   T(F): 96.5 (19 @ 05:34), Max: 103.6 (19 @ 14:59)  HR: 89 (19 @ 05:34) (83 - 110)  BP: 106/55 (19 @ 05:34) (89/50 - 119/54)  RR: 18 (19 @ 05:34) (18 - 18)  SpO2: 96% (19 @ 09:28) (95% - 96%)        Physical Exam:   Constitutional: No acute distress; thin body habitus  Eyes: Conjunctivae are clear, Sclera is non-icteric.  ENT: The external ears are normal appearing,  Oral mucosa is pink and moist.  Resp:  No signs of respiratory distress. Lung sounds are clear bilaterally.  CV:  S1 S2, Regular rate and rhythm.  GI: Abdomen is scaphoid, soft, symmetric, and non-tender without distention. There are no visible lesions or scars. Bowel sounds are present and normoactive in all four quadrants. No masses, hepatomegaly, or splenomegaly are noted.   Neuro: No Tremor, No involuntary movements  Skin: No rashes, No Jaundice.        Labs:                         9.4    13.91 )-----------( 194      ( 2019 08:56 )             28.6     Neutophil% 82.9, Lymphocyte% 9.6, Monocyte% 6.9, Bands% 0.5 19 @ 08:56    2019 08:56    137    |  100    |  18     ----------------------------<  100    4.6     |  23     |  0.6      Ca    8.3        2019 08:56    TPro  8.1    /  Alb  3.2    /  TBili  0.8    /  DBili  x      /  AST  57     /  ALT  22     /  AlkPhos  60     31 Oct 2019 20:42              Troponin 0.01, CKMB --, CK -- 19 @ 09:10  Troponin 0.02, CKMB --, CK -- 10-31-19 @ 20:42        Urinalysis Basic - ( 2019 06:00 )    Color: Yellow / Appearance: Clear / S.028 / pH: x  Gluc: x / Ketone: Negative  / Bili: Negative / Urobili: <2 mg/dL   Blood: x / Protein: 100 mg/dL / Nitrite: Negative   Leuk Esterase: Negative / RBC: 24 /HPF / WBC 10 /HPF   Sq Epi: x / Non Sq Epi: 9 /HPF / Bacteria: Negative          Culture - Blood (collected 10-31-19 @ 20:52)  Source: .Blood Blood  Preliminary Report (19 @ 03:01):    No growth to date.    Culture - Blood (collected 10-31-19 @ 20:52)  Source: .Blood Blood  Preliminary Report (19 @ 03:01):    No growth to date.        Radiology:

## 2019-11-03 NOTE — DIETITIAN INITIAL EVALUATION ADULT. - OTHER INFO
Pt adm for Cough, fever, likely acute viral Bronchitis vs Aspiration. COPD, with Intersitial lung disease. Urinary incontinence s/p Martinez placement in ED. Failure to thrive. Folic acid deficiency -on Folic acid supplement. Per SLP eval, +delayed overt s/s aspiration/penetration for puree and thin liquids, position upright (90 degrees), small sips/bites, multiple swallows (minimum of 3 swallows for every bite+sip). GI following:  if patient cannot tolerate or tolerates in only small amounts, can consider PEG. Per RN, plan is to trial NG tube, if he can tolerate, then consider PEG vs comfort measures.

## 2019-11-03 NOTE — CHART NOTE - NSCHARTNOTEFT_GEN_A_CORE
Upon Nutritional Assessment by the Registered Dietitian your patient was determined to meet criteria / has evidence of the following diagnosis/diagnoses:          [ ]  Mild Protein Calorie Malnutrition        [ ]  Moderate Protein Calorie Malnutrition        [x] Severe Protein Calorie Malnutrition        [ ] Unspecified Protein Calorie Malnutrition        [ ] Underweight / BMI <19        [ ] Morbid Obesity / BMI > 40      Findings as based on:  •  Comprehensive nutrition assessment and consultation  •  NFPF  •  Food acceptance and intake status from observations by staff    Indicators:   po intake <50% of estimated needs in >1 month  severe muscle depletion in temporalis, clavicle and calf region  severe fat wasting in orbital and tricep region    Treatment:    The following diet has been recommended:  Pt can best meet his nutritional needs via EN.   1) When nutrition access obtained, start Jevity 1.2 @ 240ml q 4 hours, HOLD 2AM FEEDS for total of 5 feeds/day. (this provides 1440 kcals, 66 gms protein, 972 ml free H2O)  2) If EN not initiated soon, consider Ensure Enlive q24 hrs, prosource gelatin plus q24hrs and Ensure Pudding q24hrs. Continue diet texture per SLP. Family wants to consider appetite stimulant.        PROVIDER Section:     By signing this assessment you are acknowledging and agree with the diagnosis/diagnoses assigned by the Registered Dietician    Comments:

## 2019-11-03 NOTE — DIETITIAN INITIAL EVALUATION ADULT. - MALNUTRITION
Severe PCM in the context of social/environmental circumstances RT decreased appetite 2/2 worsening dysphagia AEB po intake <50% of estimated needs in >1 month, severe muscle depletion in temporalis, clavicle and calf region, and severe fat wasting in orbital and tricep region. Goal: Pt to consume >75% of meal tray and gain 1-2 lbs in 3 days

## 2019-11-03 NOTE — PHYSICAL THERAPY INITIAL EVALUATION ADULT - SPECIFY REASON(S)
850 am Attempted to see pt for PT initial evaluation however pt appears very confused and unable to use  phone (German speaking) as pt is not following instructions. As per RN Josefina, pt

## 2019-11-03 NOTE — DIETITIAN INITIAL EVALUATION ADULT. - PHYSICAL APPEARANCE
underweight/emaciated/unable to calculate BMI with no weight recorded (RN aware, will get weight when pt back in bed) and inaccurate height in EMR. Per previous adm to [facility], pt is 66in and weighed 93# on 10/10/19, and 105# on 7/29/19. IBW: 64.5 kg Weight loss is evident but unable to calculate % at this time. Based on NFPF, pt has severe muscle wasting to temporalis, clavicle, calf region and severe fat wasting to orbital and tricep region. pt meets criteria for PCM. Stage 1 PU sacrum, R Hip unstageable PU. No edema

## 2019-11-03 NOTE — DIETITIAN INITIAL EVALUATION ADULT. - ENTERAL
When nutrition access obtained, start Jevity 1.2 @ 240ml q 4 hours, HOLD 2AM FEEDS for total of 5 feeds/day. (this provides 1440 kcals, 66 gms protein, 972 ml free H2O)

## 2019-11-03 NOTE — DIETITIAN INITIAL EVALUATION ADULT. - FACTORS AFF FOOD INTAKE
Pt is Frisian speaking. Pt's nephew and his wife are at b/s. They report has had poor appetite for a few months, has lost an estimated 50# in 6 months. Spoke to RN, pt had only a few bites of breakfast tray, but pt ate better at lunch and dinner when family was present. Observed 100% of meat and vegetables, 0% mashed potato eaten on dinner tray. 1:1 feeds. Was on puree diet PTA. NKFA. LBM 11/3 no GI distress. Extensively discussed nutrition interventions (po supplements, appetite stimulant, enteral nutrition) with pt's nephew, agreeable to trial a variety of nutrition supplements./difficulty swallowing/persistent lack of appetite/difficulty feeding self

## 2019-11-03 NOTE — DIETITIAN INITIAL EVALUATION ADULT. - ENERGY NEEDS
estimated calorie needs: 1259-1134 kcal/day (30-35 kcal/kg CBW to promote wt gain/maintenance in severely malnourished pt).  estimated protein needs: 50-63 g/day (1.2-1.5 g/kg CBW, reason mentioned above).  estimated fluid needs: 1mL/kcal or per LIP estimated calorie needs: 3311-5607 kcal/day (30-35 kcal/kg CBW to promote wt gain/maintenance in severely malnourished pt/PU).  estimated protein needs: 50-63 g/day (1.2-1.5 g/kg CBW, reason mentioned above).  estimated fluid needs: 1mL/kcal or per LIP

## 2019-11-03 NOTE — DIETITIAN INITIAL EVALUATION ADULT. - ADD RECOMMEND
Pt can best meet his nutritional needs via EN. If EN not initiated soon, consider Ensure Enlive q24 hrs, prosource gelatin plus q24hrs and Ensure Pudding q24hrs. Continue diet texture per SLP. Family wants to consider appetite stimulant. Pt can best meet his nutritional needs via EN. If EN not initiated soon, consider Ensure Enlive q24 hrs, prosource gelatin plus q24hrs and Ensure Pudding q24hrs. Continue diet texture per SLP. Family wants to consider appetite stimulant. Spoke w/ covering resident [phone]

## 2019-11-03 NOTE — PROGRESS NOTE ADULT - SUBJECTIVE AND OBJECTIVE BOX
Name: KEN RAMIREZ  Age: 79y  Gender: Male    Pt was seen and examined.   about same    Allergies:  No Known Allergies      PHYSICAL EXAM:    Vitals:  T(C): 36.7 (11-03-19 @ 23:16), Max: 38.6 (11-03-19 @ 15:25)  HR: 97 (11-03-19 @ 23:16) (61 - 102)  BP: 98/65 (11-03-19 @ 23:16) (94/55 - 114/57)  RR: 18 (11-03-19 @ 21:09) (18 - 18)  SpO2: 92% (11-03-19 @ 20:04) (92% - 94%)  Wt(kg): --Vital Signs Last 24 Hrs  T(C): 36.7 (03 Nov 2019 23:16), Max: 38.6 (03 Nov 2019 15:25)  T(F): 98 (03 Nov 2019 23:16), Max: 101.4 (03 Nov 2019 15:25)  HR: 97 (03 Nov 2019 23:16) (61 - 102)  BP: 98/65 (03 Nov 2019 23:16) (94/55 - 114/57)  BP(mean): --  RR: 18 (03 Nov 2019 21:09) (18 - 18)  SpO2: 92% (03 Nov 2019 20:04) (92% - 94%)      NECK: Supple, No JVD  CHEST/LUNG: CTA, B/L, No rales, rhonchi, wheezing  HEART: S1,S2, N1 Regular rate and rhythm; No murmurs, rubs, or gallops  ABDOMEN: Soft, Nontender, Nondistended; Bowel sounds present  EXTREMITIES:  2+ Peripheral Pulses, No clubbing, cyanosis, or edema      LABS:                        9.4    13.91 )-----------( 194      ( 02 Nov 2019 08:56 )             28.6     11-02    137  |  100  |  18  ----------------------------<  100<H>  4.6   |  23  |  0.6<L>    Ca    8.3<L>      02 Nov 2019 08:56              MEDICATIONS  (STANDING):  cefTRIAXone   IVPB 1000 milliGRAM(s) IV Intermittent every 24 hours  chlorhexidine 4% Liquid 1 Application(s) Topical <User Schedule>  doxycycline IVPB      doxycycline IVPB 100 milliGRAM(s) IV Intermittent every 12 hours  enoxaparin Injectable 40 milliGRAM(s) SubCutaneous at bedtime  folic acid 1 milliGRAM(s) Oral daily  influenza   Vaccine 0.5 milliLiter(s) IntraMuscular once  mupirocin 2% Nasal 1 Application(s) Nasal two times a day  tamsulosin 0.4 milliGRAM(s) Oral at bedtime        RADIOLOGY & ADDITIONAL TESTS:    Imaging Personally Reviewed:  [ ] YES  [ ] NO    A/P:  Assessment and Plan:   · Assessment		  79 year old male with PMHx of COPD, Interstitial lung disease, BPH presenting due to cough of 1 week duration.     #Cough, fever, likely acute viral Bronchitis vs Aspiration   pt likely with recurrent aspiration  needs PEG but family and pt not willing to   WILL ADDRESS WITH FAMILY AND IF NO PEG THEN COMFORT MEASURES    #COPD, with  Intersitial lung disease  CXR showing evidence of disease progression  Currently not wheezing, not in exacerbation  Duonebs q6 PRN    #Urinary incontinence  S/p Martinez placement in ED  trial of void in AM    #Failure to thrive/SEVERE MALNUTRITION  Seen by S&S on previous admission: Dysphagia 1  NOT MUCH TO DO IF NO PEG  Encourage PO intake   Replete lytes PRN     #Folic acid deficiency  On Folic acid supplement     #DVT PPX: Lovenox  #GI PPX: Not indicated  #Activity: With assistance  #Diet: Dysphagia 1, thins  #Dispo: From home, lives with son  #Full code    Son will have family meeting about PEG vs Comfort care

## 2019-11-04 NOTE — PROGRESS NOTE ADULT - SUBJECTIVE AND OBJECTIVE BOX
Hospital Day:  3d    Subjective:    Pt was interviewed and examined at the bedside in the AM. No acute events overnight.   Poor PO intake, denies any complaints.     Past Medical Hx:   HTN (hypertension)  Emphysematous COPD    Past Sx:  No significant past surgical history    Allergies:  No Known Allergies    Current Meds:   Standng Meds:  cefTRIAXone   IVPB 1000 milliGRAM(s) IV Intermittent every 24 hours  chlorhexidine 4% Liquid 1 Application(s) Topical <User Schedule>  doxycycline IVPB      doxycycline IVPB 100 milliGRAM(s) IV Intermittent every 12 hours  enoxaparin Injectable 40 milliGRAM(s) SubCutaneous at bedtime  folic acid 1 milliGRAM(s) Oral daily  influenza   Vaccine 0.5 milliLiter(s) IntraMuscular once  mupirocin 2% Nasal 1 Application(s) Nasal two times a day  tamsulosin 0.4 milliGRAM(s) Oral at bedtime    PRN Meds:  acetaminophen   Tablet .. 650 milliGRAM(s) Oral every 6 hours PRN Temp greater or equal to 38C (100.4F), Mild Pain (1 - 3)  albuterol/ipratropium for Nebulization 3 milliLiter(s) Nebulizer every 6 hours PRN Shortness of Breath and/or Wheezing    HOME MEDICATIONS:  tamsulosin 0.4 mg oral capsule: 1 cap(s) orally once a day      Vital Signs:   T(F): 97.2 (19 @ 04:53), Max: 101.4 (19 @ 15:25)  HR: 100 (19 @ 04:53) (61 - 100)  BP: 102/51 (19 @ 04:53) (94/55 - 114/57)  RR: 18 (19 @ 04:53) (18 - 18)  SpO2: 93% (19 @ 07:30) (92% - 93%)      19 @ 07:01  -  19 @ 07:00  --------------------------------------------------------  IN: 0 mL / OUT: 400 mL / NET: -400 mL    19 @ 07:01  -  11-04-19 @ 11:09  --------------------------------------------------------  IN: 0 mL / OUT: 100 mL / NET: -100 mL        Physical Exam:   CONSTITUTIONAL: Mal-nourished; in no acute distress, speaking in full sentences  HEAD: Normocephalic; atraumatic  EYES: PERRL, EOMI, no conjunctival erythema  NECK: Supple; non tender, FROM  CARD: +S1, S2 Regular rate and rhythm.  RESP: CTABL  ABD: soft ntnd, no rebound, no guarding, no rigidity  EXT: moves all extremities,  No clubbing, cyanosis or edema.   NEURO: Alert, grossly unremarkable, no focal deficits, cn ii-xii grossly intact        Labs:                         9.1    11.03 )-----------( 224      ( 2019 05:50 )             27.7     Neutophil% 67.5, Lymphocyte% 23.4, Monocyte% 7.8, Bands% 0.5 19 @ 05:50    2019 05:50    138    |  100    |  18     ----------------------------<  97     5.2     |  24     |  0.7      Ca    8.3        2019 05:50  Phos  2.9       2019 05:50  Mg     1.7       2019 05:50    TPro  6.2    /  Alb  2.2    /  TBili  0.4    /  DBili  x      /  AST  70     /  ALT  32     /  AlkPhos  60     2019 05:50              Troponin 0.01, CKMB --, CK -- 19 @ 09:10  Troponin 0.02, CKMB --, CK -- 10-31-19 @ 20:42        Urinalysis Basic - ( 2019 06:00 )    Color: Yellow / Appearance: Clear / S.028 / pH: x  Gluc: x / Ketone: Negative  / Bili: Negative / Urobili: <2 mg/dL   Blood: x / Protein: 100 mg/dL / Nitrite: Negative   Leuk Esterase: Negative / RBC: 24 /HPF / WBC 10 /HPF   Sq Epi: x / Non Sq Epi: 9 /HPF / Bacteria: Negative          Culture - Blood (collected 10-31-19 @ 20:52)  Source: .Blood Blood  Preliminary Report (19 @ 03:01):    No growth to date.    Culture - Blood (collected 10-31-19 @ 20:52)  Source: .Blood Blood  Preliminary Report (19 @ 03:01):    No growth to date.        Radiology: Hospital Day:  3d    Subjective:    Pt was interviewed and examined at the bedside in the AM. No acute events overnight.   Poor PO intake, denies any complaints.   doing about same    Past Medical Hx:   HTN (hypertension)  Emphysematous COPD    Past Sx:  No significant past surgical history    Allergies:  No Known Allergies    Current Meds:   Standng Meds:  cefTRIAXone   IVPB 1000 milliGRAM(s) IV Intermittent every 24 hours  chlorhexidine 4% Liquid 1 Application(s) Topical <User Schedule>  doxycycline IVPB      doxycycline IVPB 100 milliGRAM(s) IV Intermittent every 12 hours  enoxaparin Injectable 40 milliGRAM(s) SubCutaneous at bedtime  folic acid 1 milliGRAM(s) Oral daily  influenza   Vaccine 0.5 milliLiter(s) IntraMuscular once  mupirocin 2% Nasal 1 Application(s) Nasal two times a day  tamsulosin 0.4 milliGRAM(s) Oral at bedtime    PRN Meds:  acetaminophen   Tablet .. 650 milliGRAM(s) Oral every 6 hours PRN Temp greater or equal to 38C (100.4F), Mild Pain (1 - 3)  albuterol/ipratropium for Nebulization 3 milliLiter(s) Nebulizer every 6 hours PRN Shortness of Breath and/or Wheezing    HOME MEDICATIONS:  tamsulosin 0.4 mg oral capsule: 1 cap(s) orally once a day      Vital Signs:   T(F): 97.2 (19 @ 04:53), Max: 101.4 (19 @ 15:25)  HR: 100 (19 @ 04:53) (61 - 100)  BP: 102/51 (19 @ 04:53) (94/55 - 114/57)  RR: 18 (19 @ 04:53) (18 - 18)  SpO2: 93% (19 @ 07:30) (92% - 93%)      19 @ 07:01  -  19 @ 07:00  --------------------------------------------------------  IN: 0 mL / OUT: 400 mL / NET: -400 mL    19 @ 07:01  -  19 @ 11:09  --------------------------------------------------------  IN: 0 mL / OUT: 100 mL / NET: -100 mL        Physical Exam:   CONSTITUTIONAL: Mal-nourished; in no acute distress, speaking in full sentences  HEAD: Normocephalic; atraumatic  EYES: PERRL, EOMI, no conjunctival erythema  NECK: Supple; non tender, FROM  CARD: +S1, S2 Regular rate and rhythm.  RESP: CTABL  ABD: soft ntnd, no rebound, no guarding, no rigidity  EXT: moves all extremities,  No clubbing, cyanosis or edema.   NEURO: Alert, grossly unremarkable, no focal deficits, cn ii-xii grossly intact        Labs:                         9.1    11.03 )-----------( 224      ( 2019 05:50 )             27.7     Neutophil% 67.5, Lymphocyte% 23.4, Monocyte% 7.8, Bands% 0.5 19 @ 05:50    2019 05:50    138    |  100    |  18     ----------------------------<  97     5.2     |  24     |  0.7      Ca    8.3        2019 05:50  Phos  2.9       2019 05:50  Mg     1.7       2019 05:50    TPro  6.2    /  Alb  2.2    /  TBili  0.4    /  DBili  x      /  AST  70     /  ALT  32     /  AlkPhos  60     2019 05:50              Troponin 0.01, CKMB --, CK -- 19 @ 09:10  Troponin 0.02, CKMB --, CK -- 10-31-19 @ 20:42        Urinalysis Basic - ( 2019 06:00 )    Color: Yellow / Appearance: Clear / S.028 / pH: x  Gluc: x / Ketone: Negative  / Bili: Negative / Urobili: <2 mg/dL   Blood: x / Protein: 100 mg/dL / Nitrite: Negative   Leuk Esterase: Negative / RBC: 24 /HPF / WBC 10 /HPF   Sq Epi: x / Non Sq Epi: 9 /HPF / Bacteria: Negative          Culture - Blood (collected 10-31-19 @ 20:52)  Source: .Blood Blood  Preliminary Report (19 @ 03:01):    No growth to date.    Culture - Blood (collected 10-31-19 @ 20:52)  Source: .Blood Blood  Preliminary Report (19 @ 03:01):    No growth to date.        Radiology:

## 2019-11-05 NOTE — PROGRESS NOTE ADULT - SUBJECTIVE AND OBJECTIVE BOX
Patient seen and examined.    Interval History:    The patient was resting comfortably early this afternoon. Per the patient's son, he is completely lucid.      Medications:  acetaminophen   Tablet .. 650 milliGRAM(s) Oral every 6 hours PRN  acetaminophen   Tablet .. 650 milliGRAM(s) Oral every 6 hours PRN  albuterol/ipratropium for Nebulization 3 milliLiter(s) Nebulizer every 6 hours PRN  chlorhexidine 4% Liquid 1 Application(s) Topical <User Schedule>  enoxaparin Injectable 40 milliGRAM(s) SubCutaneous at bedtime  folic acid 1 milliGRAM(s) Oral daily  influenza   Vaccine 0.5 milliLiter(s) IntraMuscular once  mupirocin 2% Nasal 1 Application(s) Nasal two times a day  tamsulosin 0.4 milliGRAM(s) Oral at bedtime      Review of Systems:  General:  Weight loss (per wife) as per HPI  ENT: patient tolerating feeds per NG properly in place  GI:  No pain, No nausea, No vomiting, No diarrhea, No constipation, No weight loss, No fever, No pruritis, No rectal bleeding, No tarry stools, No dysphagia  Endocrine:  No polyuria, polydipsia, cold/heat intolerance  Heme:  No petechiae, ecchymosis, easy bruisability  Skin:  No rash, tattoos, scars, edema      PHYSICAL EXAM:   Vital Signs:  Vital Signs Last 24 Hrs  T(C): 37.1 (05 Nov 2019 04:45), Max: 38.1 (04 Nov 2019 18:58)  T(F): 98.8 (05 Nov 2019 04:45), Max: 100.5 (04 Nov 2019 18:58)  HR: 97 (05 Nov 2019 12:17) (90 - 103)  BP: 96/51 (05 Nov 2019 12:17) (84/51 - 110/60)  BP(mean): --  RR: 18 (05 Nov 2019 12:17) (18 - 20)  SpO2: 94% (05 Nov 2019 00:12) (94% - 95%)  Daily     Daily     T(C): 37.1 (11-05-19 @ 04:45), Max: 38.1 (11-04-19 @ 18:58)  HR: 97 (11-05-19 @ 12:17) (90 - 103)  BP: 96/51 (11-05-19 @ 12:17) (84/51 - 110/60)  RR: 18 (11-05-19 @ 12:17) (18 - 20)  SpO2: 94% (11-05-19 @ 00:12) (94% - 95%)    GENERAL:  Appears stated age, well-groomed, no distress, per son patient lucid; very thin body habitus  HEENT:  NC/AT, conjunctivae clear and pink, no JVD, sclera -anicteric  CHEST:  Full & symmetric excursion, no increased effort, breath sounds clear  HEART:  Regular rhythm, S1, S2, no murmur  ABDOMEN:  Soft, non-tender, non-distended, normoactive bowel sounds,  no masses ,no hepatosplenomegaly, no external signs of chronic liver disease  EXTREMITIES:  no cyanosis, clubbing or edema  SKIN:  No rash/erythema/ecchymoses/petechiae/wounds/abscess/warm/dry  NEURO:  Alert, oriented (per son), no tremor, no encephalopathy    LABS:                        8.6    8.89  )-----------( 267      ( 05 Nov 2019 08:11 )             26.8     11-05    139  |  102  |  19  ----------------------------<  88  4.1   |  24  |  0.5<L>    Ca    8.1<L>      05 Nov 2019 08:11  Phos  2.5     11-05  Mg     1.7     11-05    TPro  5.7<L>  /  Alb  2.3<L>  /  TBili  0.4  /  DBili  x   /  AST  56<H>  /  ALT  35  /  AlkPhos  56  11-05    LIVER FUNCTIONS - ( 05 Nov 2019 08:11 )  Alb: 2.3 g/dL / Pro: 5.7 g/dL / ALK PHOS: 56 U/L / ALT: 35 U/L / AST: 56 U/L / GGT: x             < from: Xray Chest 1 View-PORTABLE IMMEDIATE (11.04.19 @ 11:18) >  Findings:    Support devices: Gastric tube courses below the diaphragm with its tip   not included.    Cardiac/mediastinum/hilum: Unremarkable.    Lung parenchyma/Pleura: Stable diffuse interstitial pulmonary opacities.   No pleural effusion or pneumothorax is seen.    Skeleton/soft tissues: Unchanged.    Impression:      Gastric tube courses below the diaphragm. Patient seen and examined.    Interval History:    The patient was resting comfortably early this afternoon. Per the patient's son, he is completely lucid. Reports decreased oral intake, being fed through NG feeds      Medications:  acetaminophen   Tablet .. 650 milliGRAM(s) Oral every 6 hours PRN  acetaminophen   Tablet .. 650 milliGRAM(s) Oral every 6 hours PRN  albuterol/ipratropium for Nebulization 3 milliLiter(s) Nebulizer every 6 hours PRN  chlorhexidine 4% Liquid 1 Application(s) Topical <User Schedule>  enoxaparin Injectable 40 milliGRAM(s) SubCutaneous at bedtime  folic acid 1 milliGRAM(s) Oral daily  influenza   Vaccine 0.5 milliLiter(s) IntraMuscular once  mupirocin 2% Nasal 1 Application(s) Nasal two times a day  tamsulosin 0.4 milliGRAM(s) Oral at bedtime      Review of Systems:  General:  Weight loss (per wife) as per HPI  ENT: patient tolerating feeds per NG properly in place  GI:  No pain, No nausea, No vomiting, No diarrhea, No constipation, No weight loss, No fever, No pruritis, No rectal bleeding, No tarry stools, No dysphagia  Endocrine:  No polyuria, polydipsia, cold/heat intolerance  Heme:  No petechiae, ecchymosis, easy bruisability  Skin:  No rash, tattoos, scars, edema      PHYSICAL EXAM:   Vital Signs:  Vital Signs Last 24 Hrs  T(C): 37.1 (05 Nov 2019 04:45), Max: 38.1 (04 Nov 2019 18:58)  T(F): 98.8 (05 Nov 2019 04:45), Max: 100.5 (04 Nov 2019 18:58)  HR: 97 (05 Nov 2019 12:17) (90 - 103)  BP: 96/51 (05 Nov 2019 12:17) (84/51 - 110/60)  BP(mean): --  RR: 18 (05 Nov 2019 12:17) (18 - 20)  SpO2: 94% (05 Nov 2019 00:12) (94% - 95%)  Daily     Daily     T(C): 37.1 (11-05-19 @ 04:45), Max: 38.1 (11-04-19 @ 18:58)  HR: 97 (11-05-19 @ 12:17) (90 - 103)  BP: 96/51 (11-05-19 @ 12:17) (84/51 - 110/60)  RR: 18 (11-05-19 @ 12:17) (18 - 20)  SpO2: 94% (11-05-19 @ 00:12) (94% - 95%)    GENERAL:  Appears stated age, well-groomed, no distress, per son patient lucid; very thin body habitus  HEENT:  NC/AT, conjunctivae clear and pink, no JVD, sclera -anicteric  CHEST:  Full & symmetric excursion, no increased effort, breath sounds clear  HEART:  Regular rhythm, S1, S2, no murmur  ABDOMEN:  Soft, non-tender, non-distended, normoactive bowel sounds,  no masses ,no hepatosplenomegaly, no external signs of chronic liver disease  EXTREMITIES:  no cyanosis, clubbing or edema  SKIN:  No rash/erythema/ecchymoses/petechiae/wounds/abscess/warm/dry  NEURO:  Alert, oriented (per son), no tremor, no encephalopathy    LABS:                        8.6    8.89  )-----------( 267      ( 05 Nov 2019 08:11 )             26.8     11-05    139  |  102  |  19  ----------------------------<  88  4.1   |  24  |  0.5<L>    Ca    8.1<L>      05 Nov 2019 08:11  Phos  2.5     11-05  Mg     1.7     11-05    TPro  5.7<L>  /  Alb  2.3<L>  /  TBili  0.4  /  DBili  x   /  AST  56<H>  /  ALT  35  /  AlkPhos  56  11-05    LIVER FUNCTIONS - ( 05 Nov 2019 08:11 )  Alb: 2.3 g/dL / Pro: 5.7 g/dL / ALK PHOS: 56 U/L / ALT: 35 U/L / AST: 56 U/L / GGT: x             < from: Xray Chest 1 View-PORTABLE IMMEDIATE (11.04.19 @ 11:18) >  Findings:    Support devices: Gastric tube courses below the diaphragm with its tip   not included.    Cardiac/mediastinum/hilum: Unremarkable.    Lung parenchyma/Pleura: Stable diffuse interstitial pulmonary opacities.   No pleural effusion or pneumothorax is seen.    Skeleton/soft tissues: Unchanged.    Impression:      Gastric tube courses below the diaphragm.

## 2019-11-05 NOTE — PROGRESS NOTE ADULT - SUBJECTIVE AND OBJECTIVE BOX
Hospital Day:  4d    Subjective:    Pt was interviewed and examined at the bedside in the AM. No acute events overnight.   Pt denies any complaints in the AM. Pt family agreeable to PEG tube.     Past Medical Hx:   HTN (hypertension)  Emphysematous COPD    Past Sx:  No significant past surgical history    Allergies:  No Known Allergies    Current Meds:   Standng Meds:  chlorhexidine 4% Liquid 1 Application(s) Topical <User Schedule>  enoxaparin Injectable 40 milliGRAM(s) SubCutaneous at bedtime  folic acid 1 milliGRAM(s) Oral daily  influenza   Vaccine 0.5 milliLiter(s) IntraMuscular once  magnesium sulfate  IVPB 2 Gram(s) IV Intermittent every 2 hours  mupirocin 2% Nasal 1 Application(s) Nasal two times a day  tamsulosin 0.4 milliGRAM(s) Oral at bedtime    PRN Meds:  acetaminophen   Tablet .. 650 milliGRAM(s) Oral every 6 hours PRN Temp greater or equal to 38C (100.4F), Mild Pain (1 - 3)  albuterol/ipratropium for Nebulization 3 milliLiter(s) Nebulizer every 6 hours PRN Shortness of Breath and/or Wheezing    HOME MEDICATIONS:  tamsulosin 0.4 mg oral capsule: 1 cap(s) orally once a day      Vital Signs:   T(F): 98.8 (11-05-19 @ 04:45), Max: 100.5 (11-04-19 @ 18:58)  HR: 103 (11-05-19 @ 04:45) (90 - 114)  BP: 110/60 (11-05-19 @ 04:45) (84/51 - 110/60)  RR: 20 (11-05-19 @ 04:45) (18 - 20)  SpO2: 94% (11-05-19 @ 00:12) (94% - 95%)      11-04-19 @ 07:01  -  11-05-19 @ 07:00  --------------------------------------------------------  IN: 1510 mL / OUT: 100 mL / NET: 1410 mL        Physical Exam:   CONSTITUTIONAL: Mal-nourished; in no acute distress, speaking in full sentences  HEAD: Normocephalic; atraumatic, NG +   EYES: PERRL, EOMI, no conjunctival erythema  NECK: Supple; non tender, FROM  CARD: +S1, S2 Regular rate and rhythm.  RESP: CTABL  ABD: soft ntnd, no rebound, no guarding, no rigidity  EXT: moves all extremities,  No clubbing, cyanosis or edema.   NEURO: Alert, grossly unremarkable, no focal deficits, cn ii-xii grossly intact        Labs:                         8.6    8.89  )-----------( 267      ( 05 Nov 2019 08:11 )             26.8     Neutophil% 67.0, Lymphocyte% 23.4, Monocyte% 8.2, Bands% 0.8 11-05-19 @ 08:11    05 Nov 2019 08:11    139    |  102    |  19     ----------------------------<  88     4.1     |  24     |  0.5      Ca    8.1        05 Nov 2019 08:11  Phos  2.5       05 Nov 2019 08:11  Mg     1.7       05 Nov 2019 08:11    TPro  5.7    /  Alb  2.3    /  TBili  0.4    /  DBili  x      /  AST  56     /  ALT  35     /  AlkPhos  56     05 Nov 2019 08:11                          Culture - Blood (collected 10-31-19 @ 20:52)  Source: .Blood Blood  Preliminary Report (11-02-19 @ 03:01):    No growth to date.    Culture - Blood (collected 10-31-19 @ 20:52)  Source: .Blood Blood  Preliminary Report (11-02-19 @ 03:01):    No growth to date.        Radiology: Hospital Day:  4d    Subjective:    Pt was interviewed and examined at the bedside in the AM. No acute events overnight.   Pt denies any complaints in the AM. Pt family agreeable to PEG tube.   doing about same    Past Medical Hx:   HTN (hypertension)  Emphysematous COPD    Past Sx:  No significant past surgical history    Allergies:  No Known Allergies    Current Meds:   Standng Meds:  chlorhexidine 4% Liquid 1 Application(s) Topical <User Schedule>  enoxaparin Injectable 40 milliGRAM(s) SubCutaneous at bedtime  folic acid 1 milliGRAM(s) Oral daily  influenza   Vaccine 0.5 milliLiter(s) IntraMuscular once  magnesium sulfate  IVPB 2 Gram(s) IV Intermittent every 2 hours  mupirocin 2% Nasal 1 Application(s) Nasal two times a day  tamsulosin 0.4 milliGRAM(s) Oral at bedtime    PRN Meds:  acetaminophen   Tablet .. 650 milliGRAM(s) Oral every 6 hours PRN Temp greater or equal to 38C (100.4F), Mild Pain (1 - 3)  albuterol/ipratropium for Nebulization 3 milliLiter(s) Nebulizer every 6 hours PRN Shortness of Breath and/or Wheezing    HOME MEDICATIONS:  tamsulosin 0.4 mg oral capsule: 1 cap(s) orally once a day      Vital Signs:   T(F): 98.8 (11-05-19 @ 04:45), Max: 100.5 (11-04-19 @ 18:58)  HR: 103 (11-05-19 @ 04:45) (90 - 114)  BP: 110/60 (11-05-19 @ 04:45) (84/51 - 110/60)  RR: 20 (11-05-19 @ 04:45) (18 - 20)  SpO2: 94% (11-05-19 @ 00:12) (94% - 95%)      11-04-19 @ 07:01  -  11-05-19 @ 07:00  --------------------------------------------------------  IN: 1510 mL / OUT: 100 mL / NET: 1410 mL        Physical Exam:   CONSTITUTIONAL: Mal-nourished; in no acute distress, speaking in full sentences  HEAD: Normocephalic; atraumatic, NG +   EYES: PERRL, EOMI, no conjunctival erythema  NECK: Supple; non tender, FROM  CARD: +S1, S2 Regular rate and rhythm.  RESP: CTABL  ABD: soft ntnd, no rebound, no guarding, no rigidity  EXT: moves all extremities,  No clubbing, cyanosis or edema.   NEURO: Alert, grossly unremarkable, no focal deficits, cn ii-xii grossly intact        Labs:                         8.6    8.89  )-----------( 267      ( 05 Nov 2019 08:11 )             26.8     Neutophil% 67.0, Lymphocyte% 23.4, Monocyte% 8.2, Bands% 0.8 11-05-19 @ 08:11    05 Nov 2019 08:11    139    |  102    |  19     ----------------------------<  88     4.1     |  24     |  0.5      Ca    8.1        05 Nov 2019 08:11  Phos  2.5       05 Nov 2019 08:11  Mg     1.7       05 Nov 2019 08:11    TPro  5.7    /  Alb  2.3    /  TBili  0.4    /  DBili  x      /  AST  56     /  ALT  35     /  AlkPhos  56     05 Nov 2019 08:11                          Culture - Blood (collected 10-31-19 @ 20:52)  Source: .Blood Blood  Preliminary Report (11-02-19 @ 03:01):    No growth to date.    Culture - Blood (collected 10-31-19 @ 20:52)  Source: .Blood Blood  Preliminary Report (11-02-19 @ 03:01):    No growth to date.        Radiology:

## 2019-11-05 NOTE — PROGRESS NOTE ADULT - ASSESSMENT
79 year old male with PMHx of COPD, Interstitial lung disease, and BPH, presenting due to cough of 1 week duration, admitted for acute URI.    # Progressive oropharyngeal dysphagia to solids and liquids x 3 months' duration associated w/ ~50 pound weight loss   Per wife there may be component of dementia but per son the patient is lucid    MBS on prior admission 8/2019 revealed high risk for aspiration  Based on the described oropharyngeal type symptoms as well as the prior speech pathology assessment/recommendation, doubt that EGD will be useful (dysphagia is unlikely to be esophageal in origin)  Patient's son agreeable for PEG scheduled for tomorrow 11/6  F/u 8 PM PT/INR on 11/5    # Possible magnesium deficiency  Mg 1.7 on 11/5  F/u 8 PM Mg; replete if low    Will follow. 79 year old male with PMHx of COPD, Interstitial lung disease, and BPH, presenting due to cough of 1 week duration, admitted for acute URI.    # Progressive oropharyngeal dysphagia to solids and liquids x 3 months' duration associated w/ ~50 pound weight loss   Per wife there may be component of dementia but per son the patient is lucid    MBS on prior admission 8/2019 revealed high risk for aspiration  Based on the described oropharyngeal type symptoms as well as the prior speech pathology assessment/recommendation, doubt that EGD will be useful (dysphagia is unlikely to be esophageal in origin)  Patient's son agreeable for PEG scheduled for tomorrow 11/6  Hold NG feeds after midnight for PEG tomorrow  F/u 8 PM PT/INR on 11/5    # Possible magnesium deficiency  Mg 1.7 on 11/5  F/u 8 PM Mg; replete if low    Will follow.

## 2019-11-05 NOTE — PROGRESS NOTE ADULT - ASSESSMENT
79 year old male with PMHx of COPD, Interstitial lung disease, BPH presenting due to cough of 1 week duration.     #Failure to thrive with Severe Malnutrition  Seen by S&S on previous admission: Dysphagia 1  NG tube placed as per family request   Replete lytes PRN   Pt family agreeable to PEG tube   GI recalled     #Cough, fever, likely due to Aspiration   NG tube placed   off Abx     #COPD, with  Intersitial lung disease  CXR showing evidence of disease progression  Currently not wheezing, not in exacerbation  Duonebs q6 PRN    #Folic acid deficiency  On Folic acid supplement     DVT PPX: Lovenox  GI PPX: Not indicated  Activity: With assistance  Diet: tube feeds   Dispo: From home, lives with son Lizbet [phone]  Full code 79 year old male with PMHx of COPD, Interstitial lung disease, BPH presenting due to cough of 1 week duration.     #Failure to thrive with Severe Malnutrition  Seen by S&S on previous admission: Dysphagia 1  NG tube placed as per family request   Replete lytes PRN   Pt family agreeable to PEG tube and is scheduled for tomorrow    #Cough, fever, likely due to Aspiration   NG tube placed   off Abx     #COPD, with  Intersitial lung disease  CXR showing evidence of disease progression  Currently not wheezing, not in exacerbation  Duonebs q6 PRN    #Folic acid deficiency  On Folic acid supplement     DVT PPX: Lovenox  GI PPX: Not indicated  Activity: With assistance  Diet: tube feeds   Dispo: From home, lives with son Lizbet [phone]  Full code

## 2019-11-06 NOTE — PRE-ANESTHESIA EVALUATION ADULT - NSANTHOSAYNRD_GEN_A_CORE
No. IFEANYI screening performed.  STOP BANG Legend: 0-2 = LOW Risk; 3-4 = INTERMEDIATE Risk; 5-8 = HIGH Risk

## 2019-11-06 NOTE — PROGRESS NOTE ADULT - ASSESSMENT
79 year old male with PMHx of COPD, Interstitial lung disease, BPH presenting due to cough of 1 week duration.     #Failure to thrive with Severe Malnutrition  Seen by S&S on previous admission: Dysphagia 1  Replete lytes PRN   PEG placement today   Monitor and educate family on using PEG     #COPD, with  Intersitial lung disease  CXR showing evidence of disease progression  Currently not wheezing, not in exacerbation  Duonebs q6 PRN    #Folic acid deficiency  On Folic acid supplement     DVT PPX: Lovenox  GI PPX: Not indicated  Activity: With assistance  Diet: tube feeds   Dispo: From home, lives with son Lizbet [phone]  FULL CODE 79 year old male with PMHx of COPD, Interstitial lung disease, BPH presenting due to cough of 1 week duration.     #Failure to thrive with Severe Malnutrition  Seen by S&S on previous admission: Dysphagia 1  Replete lytes PRN   PEG placed today   Monitor and educate family on using PEG     #COPD, with  Intersitial lung disease  CXR showing evidence of disease progression  Currently not wheezing, not in exacerbation  Duonebs q6 PRN    #Folic acid deficiency  On Folic acid supplement     DVT PPX: Lovenox  GI PPX: Not indicated  Activity: With assistance  Diet: tube feeds   Dispo: From home, lives with son Lizbet [phone]  FULL CODE

## 2019-11-06 NOTE — CHART NOTE - NSCHARTNOTEFT_GEN_A_CORE
Registered Dietitian Follow-Up     Patient Profile Reviewed                           Yes [x]   No []     Nutrition History Previously Obtained        Yes [x]  No []       Pertinent Medical Interventions: Pt's family agreeable to PEG tube and is scheduled for placement today.     Diet order: currently TF on hold since midnight for PEG placement; was previously receiving Jevity 1.2 240ml Q4H (2am feeds held), which pt was tolerating well, w/o any issues per RN.      Anthropometrics:  - Ht. 66"  - Wt. no wts available in EMR; pt off unit for PEG placement therefore will obtain bed-scale wt upon f/u  - %wt change: weighed 93# on 10/10/19, and 105# on 7/29/19  - BMI: unable to calculate at this time, however noted to be underweight, emaciated  - IBW: 142#     Pertinent Lab Data: Reviewed (11/6): H/H 8.6/26.5, Cr. <0.5, Mg 2.5      Pertinent Meds: lovenox, albuterol, folic acid, tamsulosin     Physical Findings:  - Appearance: off unit; no edema noted   - GI function: LBM 11/6   - Oral/Mouth cavity: NPO w/ alternate means of nutrition/hydration   - Skin: stage 1 pressure injury to coccyx, unstageable to R hip      Nutrition Requirements  Weight Used: last documented weight 93#/42.3 kg; will readjust prn once most recent wt obtained (needs continued from RD note on 11/3)     Estimated calorie needs: 5631-6941 kcal/day (30-35 kcal/kg CBW to promote wt gain/maintenance in severely malnourished pt/PU).  Estimated protein needs: 50-63 g/day (1.2-1.5 g/kg CBW, reason mentioned above).  Estimated fluid needs: 1mL/kcal or per LIP     Nutrient Intake: not meeting kcal/pro needs at this time      Previous Nutrition Diagnosis: 1. Severe Protein-Calorie Malnutrition, 2. Inadequate protein energy intake (both ongoing)      Nutrition Intervention: enteral nutrition     Recommendations:  1. Upon successful PEG placement, resume previous TF regimen of Jevity 1.2 240ml Q4H (HOLD 2AM feed). Regimen to provide a total of 1440 kcal, 67 gm pro, 968ml free water. Flush with 50ml free water pre/post feeds.      Goal/Expected Outcome: Pt to meet % of estimated nutrient needs within 3 days      Indicator/Monitoring: RD to monitor diet order, energy intake, body composition, electrolyte profile, nutrition focused physical findings

## 2019-11-06 NOTE — PRE-ANESTHESIA EVALUATION ADULT - NSANTHADDINFOFT_GEN_ALL_CORE
risks, benefits, alternatives discussed with the patient and family and they are agreeable to the plan

## 2019-11-06 NOTE — PROGRESS NOTE ADULT - SUBJECTIVE AND OBJECTIVE BOX
Hospital Day:  5d    Subjective:    Pt was interviewed and examined at the bedside in the AM. No acute events overnight.   Pt denies any complaints in the AM. Going for PEG today.     Past Medical Hx:   HTN (hypertension)  Emphysematous COPD    Past Sx:  No significant past surgical history    Allergies:  No Known Allergies    Current Meds:   Standng Meds:  ceFAZolin   IVPB 1000 milliGRAM(s) IV Intermittent once  chlorhexidine 4% Liquid 1 Application(s) Topical <User Schedule>  enoxaparin Injectable 40 milliGRAM(s) SubCutaneous at bedtime  folic acid 1 milliGRAM(s) Oral daily  influenza   Vaccine 0.5 milliLiter(s) IntraMuscular once  tamsulosin 0.4 milliGRAM(s) Oral at bedtime    PRN Meds:  acetaminophen   Tablet .. 650 milliGRAM(s) Oral every 6 hours PRN Mild Pain (1 - 3)  acetaminophen   Tablet .. 650 milliGRAM(s) Oral every 6 hours PRN Temp greater or equal to 38C (100.4F)  albuterol/ipratropium for Nebulization 3 milliLiter(s) Nebulizer every 6 hours PRN Shortness of Breath and/or Wheezing    HOME MEDICATIONS:  tamsulosin 0.4 mg oral capsule: 1 cap(s) orally once a day      Vital Signs:   T(F): 98.5 (11-06-19 @ 08:26), Max: 98.5 (11-06-19 @ 08:26)  HR: 97 (11-06-19 @ 09:32) (97 - 107)  BP: 113/57 (11-06-19 @ 09:32) (96/51 - 120/57)  RR: 18 (11-06-19 @ 09:32) (18 - 18)  SpO2: 93% (11-05-19 @ 20:15) (93% - 93%)      11-05-19 @ 07:01  -  11-06-19 @ 07:00  --------------------------------------------------------  IN: 1710 mL / OUT: 0 mL / NET: 1710 mL        Physical Exam:   CONSTITUTIONAL: Mal-nourished; in no acute distress, speaking in full sentences  HEAD: Normocephalic; atraumatic, NG +   EYES: PERRL, EOMI, no conjunctival erythema  NECK: Supple; non tender, FROM  CARD: +S1, S2 Regular rate and rhythm.  RESP: CTABL  ABD: soft ntnd, no rebound, no guarding, no rigidity  EXT: moves all extremities,  No clubbing, cyanosis or edema.   NEURO: Alert, grossly unremarkable, no focal deficits, cn ii-xii grossly intact          Labs:                         8.6    7.58  )-----------( 279      ( 06 Nov 2019 07:04 )             26.5     Neutophil% 61.5, Lymphocyte% 27.2, Monocyte% 9.5, Bands% 0.5 11-06-19 @ 07:04    06 Nov 2019 07:04    137    |  101    |  16     ----------------------------<  98     4.4     |  24     |  <0.5     Ca    7.9        06 Nov 2019 07:04  Phos  2.6       06 Nov 2019 07:04  Mg     2.5       06 Nov 2019 07:04    TPro  5.9    /  Alb  2.4    /  TBili  0.5    /  DBili  x      /  AST  65     /  ALT  41     /  AlkPhos  59     06 Nov 2019 07:04       pTT    --             ----< 1.33 INR  (11-05-19 @ 21:35)    15.30        PT                      Culture - Blood (collected 10-31-19 @ 20:52)  Source: .Blood Blood  Final Report (11-06-19 @ 02:00):    No growth at 5 days.    Culture - Blood (collected 10-31-19 @ 20:52)  Source: .Blood Blood  Final Report (11-06-19 @ 02:00):    No growth at 5 days.        Radiology: Hospital Day:  5d    Subjective:    Pt was interviewed and examined at the bedside in the AM. No acute events overnight.   s/p PEG earlier today    Past Medical Hx:   HTN (hypertension)  Emphysematous COPD    Past Sx:  No significant past surgical history    Allergies:  No Known Allergies    Current Meds:   Standng Meds:  ceFAZolin   IVPB 1000 milliGRAM(s) IV Intermittent once  chlorhexidine 4% Liquid 1 Application(s) Topical <User Schedule>  enoxaparin Injectable 40 milliGRAM(s) SubCutaneous at bedtime  folic acid 1 milliGRAM(s) Oral daily  influenza   Vaccine 0.5 milliLiter(s) IntraMuscular once  tamsulosin 0.4 milliGRAM(s) Oral at bedtime    PRN Meds:  acetaminophen   Tablet .. 650 milliGRAM(s) Oral every 6 hours PRN Mild Pain (1 - 3)  acetaminophen   Tablet .. 650 milliGRAM(s) Oral every 6 hours PRN Temp greater or equal to 38C (100.4F)  albuterol/ipratropium for Nebulization 3 milliLiter(s) Nebulizer every 6 hours PRN Shortness of Breath and/or Wheezing    HOME MEDICATIONS:  tamsulosin 0.4 mg oral capsule: 1 cap(s) orally once a day      Vital Signs:   T(F): 98.5 (11-06-19 @ 08:26), Max: 98.5 (11-06-19 @ 08:26)  HR: 97 (11-06-19 @ 09:32) (97 - 107)  BP: 113/57 (11-06-19 @ 09:32) (96/51 - 120/57)  RR: 18 (11-06-19 @ 09:32) (18 - 18)  SpO2: 93% (11-05-19 @ 20:15) (93% - 93%)      11-05-19 @ 07:01  -  11-06-19 @ 07:00  --------------------------------------------------------  IN: 1710 mL / OUT: 0 mL / NET: 1710 mL        Physical Exam:   CONSTITUTIONAL: Mal-nourished; in no acute distress, speaking in full sentences  HEAD: Normocephalic; atraumatic, NG +   EYES: PERRL, EOMI, no conjunctival erythema  NECK: Supple; non tender, FROM  CARD: +S1, S2 Regular rate and rhythm.  RESP: CTABL  ABD: soft ntnd, no rebound, no guarding, no rigidity  EXT: moves all extremities,  No clubbing, cyanosis or edema.   NEURO: Alert, grossly unremarkable, no focal deficits, cn ii-xii grossly intact          Labs:                         8.6    7.58  )-----------( 279      ( 06 Nov 2019 07:04 )             26.5     Neutophil% 61.5, Lymphocyte% 27.2, Monocyte% 9.5, Bands% 0.5 11-06-19 @ 07:04    06 Nov 2019 07:04    137    |  101    |  16     ----------------------------<  98     4.4     |  24     |  <0.5     Ca    7.9        06 Nov 2019 07:04  Phos  2.6       06 Nov 2019 07:04  Mg     2.5       06 Nov 2019 07:04    TPro  5.9    /  Alb  2.4    /  TBili  0.5    /  DBili  x      /  AST  65     /  ALT  41     /  AlkPhos  59     06 Nov 2019 07:04       pTT    --             ----< 1.33 INR  (11-05-19 @ 21:35)    15.30        PT                      Culture - Blood (collected 10-31-19 @ 20:52)  Source: .Blood Blood  Final Report (11-06-19 @ 02:00):    No growth at 5 days.    Culture - Blood (collected 10-31-19 @ 20:52)  Source: .Blood Blood  Final Report (11-06-19 @ 02:00):    No growth at 5 days.        Radiology:

## 2019-11-07 NOTE — PROGRESS NOTE ADULT - ASSESSMENT
79 year old male with PMHx of COPD, Interstitial lung disease, BPH presenting due to cough of 1 week duration.     #Failure to thrive with Severe Malnutrition  Seen by S&S on previous admission: Dysphagia 1  Replete lytes PRN   PEG to be placed   Monitor and educate family on using PEG once placed     #COPD, with  Intersitial lung disease  CXR showing evidence of disease progression  Currently not wheezing, not in exacerbation  Duonebs q6 PRN    #Folic acid deficiency  On Folic acid supplement     DVT PPX: Lovenox  GI PPX: Not indicated  Activity: With assistance  Diet: tube feeds   Dispo: From home, lives with abeba Somers [phone]  FULL CODE 79 year old male with PMHx of COPD, Interstitial lung disease, BPH presenting due to cough of 1 week duration.     #Failure to thrive with Severe Malnutrition  Seen by S&S on previous admission: Dysphagia 1  Replete lytes PRN   PEG to be placed   Monitor and educate family on using PEG once placed     #COPD, with  Intersitial lung disease  CXR showing evidence of disease progression  Currently not wheezing, not in exacerbation  Duonebs q6 PRN    #Folic acid deficiency  On Folic acid supplement     DVT PPX: Lovenox  GI PPX: Not indicated  Activity: With assistance  Diet: tube feeds   Dispo: From home, lives with abeba Somers [phone]    Please d/w family in am discharge home, nothing left to be done as inpt anymore

## 2019-11-07 NOTE — CONSULT NOTE ADULT - SUBJECTIVE AND OBJECTIVE BOX
INTERVENTIONAL RADIOLOGY CONSULT:     Procedure Requested: Gtube (radiologically inserted     HPI:  79 year old male with PMHx of COPD, Interstitial lung disease, BPH presenting due to cough of 1 week duration. Patient has been coughing, productive of green sputum. Over the last 3 days patient has been having decreased PO intake, felt feverish and decreased ability to ambulate. At baseline he is able to ambulate to bathroom on his own.  In ED patient was febrile 102.4.   Patient A0*3 but poor historian, contacted son via phone  Positive sick contact, son.  Patient with 2 previous hospitalizations for same complaint and failure to thrive. Lost 50lbs in 6 months. Was discharged on pureed diet 10/12/19. (01 Nov 2019 01:25)  Ir consult made for RIG.       PAST MEDICAL & SURGICAL HISTORY:  Emphysematous COPD  No significant past surgical history      MEDICATIONS  (STANDING):  bisacodyl Suppository 10 milliGRAM(s) Rectal once  chlorhexidine 4% Liquid 1 Application(s) Topical <User Schedule>  folic acid 1 milliGRAM(s) Oral daily  influenza   Vaccine 0.5 milliLiter(s) IntraMuscular once  tamsulosin 0.4 milliGRAM(s) Oral at bedtime    MEDICATIONS  (PRN):  acetaminophen   Tablet .. 650 milliGRAM(s) Oral every 6 hours PRN Mild Pain (1 - 3)  acetaminophen   Tablet .. 650 milliGRAM(s) Oral every 6 hours PRN Temp greater or equal to 38C (100.4F)  albuterol/ipratropium for Nebulization 3 milliLiter(s) Nebulizer every 6 hours PRN Shortness of Breath and/or Wheezing      Allergies    No Known Allergies    Intolerances        Social History:   Smoking: Yes [ ]  No [ ]   ______pk yrs  ETOH  Yes [ ]  No [ ]  Social [ ]  DRUGS:  Yes [ ]  No [ ]  if so what______________    FAMILY HISTORY:  FHx: pneumonia      Physical Exam:   Vital Signs Last 24 Hrs  T(C): 37.4 (07 Nov 2019 05:01), Max: 37.7 (06 Nov 2019 16:47)  T(F): 99.3 (07 Nov 2019 05:01), Max: 99.8 (06 Nov 2019 16:47)  HR: 108 (07 Nov 2019 05:01) (83 - 123)  BP: 85/60 (07 Nov 2019 05:01) (85/60 - 113/57)  BP(mean): --  RR: 18 (07 Nov 2019 05:01) (18 - 20)  SpO2: 94% (07 Nov 2019 07:45) (94% - 98%)    General:     Lungs:    Cardiovascular:     Abdomen:    Extremities:    Musculoskeletal:    Neuro/Psych:        Labs:                         8.6    7.58  )-----------( 279      ( 06 Nov 2019 07:04 )             26.5     11-06    137  |  101  |  16  ----------------------------<  98  4.4   |  24  |  <0.5<L>    Ca    7.9<L>      06 Nov 2019 07:04  Phos  2.6     11-06  Mg     2.5     11-06    TPro  5.9<L>  /  Alb  2.4<L>  /  TBili  0.5  /  DBili  x   /  AST  65<H>  /  ALT  41  /  AlkPhos  59  11-06    PT/INR - ( 05 Nov 2019 21:35 )   PT: 15.30 sec;   INR: 1.33 ratio             Pertinent labs:                      8.6    7.58  )-----------( 279      ( 06 Nov 2019 07:04 )             26.5       11-06    137  |  101  |  16  ----------------------------<  98  4.4   |  24  |  <0.5<L>    Ca    7.9<L>      06 Nov 2019 07:04  Phos  2.6     11-06  Mg     2.5     11-06    TPro  5.9<L>  /  Alb  2.4<L>  /  TBili  0.5  /  DBili  x   /  AST  65<H>  /  ALT  41  /  AlkPhos  59  11-06      PT/INR - ( 05 Nov 2019 21:35 )   PT: 15.30 sec;   INR: 1.33 ratio             Radiology & Additional Studies:     Radiology imaging reviewed.       ASSESSMENT/ PLAN:     78 yo M with failure to thrive with RIG requested-     Prior imaging reviewed with likely acceptable window for percutaneous insertion.   Plan for IR RIG Monday 11/11.   NPO PMN Luan 11/10    Thank you for the courtesy of this consult, please call [phone] with any further questions.

## 2019-11-07 NOTE — PROGRESS NOTE ADULT - SUBJECTIVE AND OBJECTIVE BOX
Hospital Day:  6d    Subjective:    Pt was interviewed and examined at the bedside in the AM. No acute events overnight.     Denies headaches, changes in vision, chest pains, SOB, n/v/d, abd pain, constipation, changes in urination, pain on urination, weakness, fatigue, joint pain or muscle pain.       Past Medical Hx:   HTN (hypertension)  Emphysematous COPD    Past Sx:  No significant past surgical history    Allergies:  No Known Allergies    Current Meds:   Standng Meds:  bisacodyl Suppository 10 milliGRAM(s) Rectal once  chlorhexidine 4% Liquid 1 Application(s) Topical <User Schedule>  folic acid 1 milliGRAM(s) Oral daily  influenza   Vaccine 0.5 milliLiter(s) IntraMuscular once  tamsulosin 0.4 milliGRAM(s) Oral at bedtime    PRN Meds:  acetaminophen   Tablet .. 650 milliGRAM(s) Oral every 6 hours PRN Mild Pain (1 - 3)  acetaminophen   Tablet .. 650 milliGRAM(s) Oral every 6 hours PRN Temp greater or equal to 38C (100.4F)  albuterol/ipratropium for Nebulization 3 milliLiter(s) Nebulizer every 6 hours PRN Shortness of Breath and/or Wheezing    HOME MEDICATIONS:  tamsulosin 0.4 mg oral capsule: 1 cap(s) orally once a day      Vital Signs:   T(F): 99.3 (11-07-19 @ 05:01), Max: 99.8 (11-06-19 @ 16:47)  HR: 108 (11-07-19 @ 05:01) (83 - 123)  BP: 85/60 (11-07-19 @ 05:01) (85/60 - 112/63)  RR: 18 (11-07-19 @ 05:01) (18 - 20)  SpO2: 94% (11-07-19 @ 07:45) (94% - 98%)      11-06-19 @ 07:01  -  11-07-19 @ 07:00  --------------------------------------------------------  IN: 1190 mL / OUT: 0 mL / NET: 1190 mL    11-07-19 @ 07:01  -  11-07-19 @ 11:02  --------------------------------------------------------  IN: 50 mL / OUT: 0 mL / NET: 50 mL        Physical Exam:   CONSTITUTIONAL: Mal-nourished; in no acute distress, speaking in full sentences  HEAD: Normocephalic; atraumatic, NG +   EYES: PERRL, EOMI, no conjunctival erythema  NECK: Supple; non tender, FROM  CARD: +S1, S2 Regular rate and rhythm.  RESP: CTABL  ABD: soft ntnd, no rebound, no guarding, no rigidity  EXT: moves all extremities,  No clubbing, cyanosis or edema.   NEURO: Alert, grossly unremarkable, no focal deficits, cn ii-xii grossly intact          Labs:                         8.6    7.58  )-----------( 279      ( 06 Nov 2019 07:04 )             26.5       06 Nov 2019 07:04    137    |  101    |  16     ----------------------------<  98     4.4     |  24     |  <0.5     Ca    7.9        06 Nov 2019 07:04  Phos  2.6       06 Nov 2019 07:04  Mg     2.5       06 Nov 2019 07:04    TPro  5.9    /  Alb  2.4    /  TBili  0.5    /  DBili  x      /  AST  65     /  ALT  41     /  AlkPhos  59     06 Nov 2019 07:04      Culture - Blood (collected 10-31-19 @ 20:52)  Source: .Blood Blood  Final Report (11-06-19 @ 02:00):    No growth at 5 days.    Culture - Blood (collected 10-31-19 @ 20:52)  Source: .Blood Blood  Final Report (11-06-19 @ 02:00):    No growth at 5 days.        Radiology: Hospital Day:  6d    Subjective:    Pt was interviewed and examined at the bedside in the AM. No acute events overnight.   doing same  Denies headaches, changes in vision, chest pains, SOB, n/v/d, abd pain, constipation, changes in urination, pain on urination, weakness, fatigue, joint pain or muscle pain.       Past Medical Hx:   HTN (hypertension)  Emphysematous COPD    Past Sx:  No significant past surgical history    Allergies:  No Known Allergies    Current Meds:   Standng Meds:  bisacodyl Suppository 10 milliGRAM(s) Rectal once  chlorhexidine 4% Liquid 1 Application(s) Topical <User Schedule>  folic acid 1 milliGRAM(s) Oral daily  influenza   Vaccine 0.5 milliLiter(s) IntraMuscular once  tamsulosin 0.4 milliGRAM(s) Oral at bedtime    PRN Meds:  acetaminophen   Tablet .. 650 milliGRAM(s) Oral every 6 hours PRN Mild Pain (1 - 3)  acetaminophen   Tablet .. 650 milliGRAM(s) Oral every 6 hours PRN Temp greater or equal to 38C (100.4F)  albuterol/ipratropium for Nebulization 3 milliLiter(s) Nebulizer every 6 hours PRN Shortness of Breath and/or Wheezing    HOME MEDICATIONS:  tamsulosin 0.4 mg oral capsule: 1 cap(s) orally once a day      Vital Signs:   T(F): 99.3 (11-07-19 @ 05:01), Max: 99.8 (11-06-19 @ 16:47)  HR: 108 (11-07-19 @ 05:01) (83 - 123)  BP: 85/60 (11-07-19 @ 05:01) (85/60 - 112/63)  RR: 18 (11-07-19 @ 05:01) (18 - 20)  SpO2: 94% (11-07-19 @ 07:45) (94% - 98%)      11-06-19 @ 07:01  -  11-07-19 @ 07:00  --------------------------------------------------------  IN: 1190 mL / OUT: 0 mL / NET: 1190 mL    11-07-19 @ 07:01  -  11-07-19 @ 11:02  --------------------------------------------------------  IN: 50 mL / OUT: 0 mL / NET: 50 mL        Physical Exam:   CONSTITUTIONAL: Mal-nourished; in no acute distress, speaking in full sentences  HEAD: Normocephalic; atraumatic, NG +   EYES: PERRL, EOMI, no conjunctival erythema  NECK: Supple; non tender, FROM  CARD: +S1, S2 Regular rate and rhythm.  RESP: CTABL  ABD: soft ntnd, no rebound, no guarding, no rigidity  EXT: moves all extremities,  No clubbing, cyanosis or edema.   NEURO: Alert, grossly unremarkable, no focal deficits, cn ii-xii grossly intact          Labs:                         8.6    7.58  )-----------( 279      ( 06 Nov 2019 07:04 )             26.5       06 Nov 2019 07:04    137    |  101    |  16     ----------------------------<  98     4.4     |  24     |  <0.5     Ca    7.9        06 Nov 2019 07:04  Phos  2.6       06 Nov 2019 07:04  Mg     2.5       06 Nov 2019 07:04    TPro  5.9    /  Alb  2.4    /  TBili  0.5    /  DBili  x      /  AST  65     /  ALT  41     /  AlkPhos  59     06 Nov 2019 07:04      Culture - Blood (collected 10-31-19 @ 20:52)  Source: .Blood Blood  Final Report (11-06-19 @ 02:00):    No growth at 5 days.    Culture - Blood (collected 10-31-19 @ 20:52)  Source: .Blood Blood  Final Report (11-06-19 @ 02:00):    No growth at 5 days.        Radiology:

## 2019-11-08 NOTE — PROGRESS NOTE ADULT - SUBJECTIVE AND OBJECTIVE BOX
Hospital Day:  7d    Subjective:    Pt was interviewed and examined at the bedside in the AM. No acute events overnight.   Denies any complaints in the AM. Going for IR PEG placement today.       Past Medical Hx:   HTN (hypertension)  Emphysematous COPD    Past Sx:  No significant past surgical history    Allergies:  No Known Allergies    Current Meds:   Standng Meds:  bisacodyl Suppository 10 milliGRAM(s) Rectal once  chlorhexidine 4% Liquid 1 Application(s) Topical <User Schedule>  folic acid 1 milliGRAM(s) Oral daily  influenza   Vaccine 0.5 milliLiter(s) IntraMuscular once  tamsulosin 0.4 milliGRAM(s) Oral at bedtime    PRN Meds:  acetaminophen   Tablet .. 650 milliGRAM(s) Oral every 6 hours PRN Mild Pain (1 - 3)  acetaminophen   Tablet .. 650 milliGRAM(s) Oral every 6 hours PRN Temp greater or equal to 38C (100.4F)  albuterol/ipratropium for Nebulization 3 milliLiter(s) Nebulizer every 6 hours PRN Shortness of Breath and/or Wheezing    HOME MEDICATIONS:  tamsulosin 0.4 mg oral capsule: 1 cap(s) orally once a day      Vital Signs:   T(F): 99.9 (11-08-19 @ 04:53), Max: 99.9 (11-08-19 @ 04:53)  HR: 112 (11-08-19 @ 04:53) (102 - 112)  BP: 111/56 (11-08-19 @ 04:53) (104/53 - 118/68)  RR: 20 (11-08-19 @ 04:53) (18 - 20)  SpO2: 94% (11-08-19 @ 08:02) (92% - 94%)      11-07-19 @ 07:01  -  11-08-19 @ 07:00  --------------------------------------------------------  IN: 1070 mL / OUT: 0 mL / NET: 1070 mL        Physical Exam:   CONSTITUTIONAL: Mal-nourished; in no acute distress, speaking in full sentences  HEAD: Normocephalic; atraumatic  EYES: PERRL, EOMI, no conjunctival erythema  NECK: Supple; non tender, FROM  CARD: +S1, S2 Regular rate and rhythm.  RESP: CTABL  ABD: soft ntnd, no rebound, no guarding, no rigidity  EXT: moves all extremities,  No clubbing, cyanosis or edema.   NEURO: Alert, grossly unremarkable, no focal deficits, cn ii-xii grossly intact        Labs:                         9.1    15.91 )-----------( 342      ( 08 Nov 2019 07:52 )             27.9     Neutophil% 77.4, Lymphocyte% 14.9, Monocyte% 6.6, Bands% 0.8 11-08-19 @ 07:52    08 Nov 2019 07:52    136    |  99     |  16     ----------------------------<  107    4.8     |  26     |  0.6      Ca    8.4        08 Nov 2019 07:52  Phos  3.6       08 Nov 2019 07:52  Mg     2.1       08 Nov 2019 07:52    TPro  6.5    /  Alb  2.7    /  TBili  0.7    /  DBili  x      /  AST  37     /  ALT  29     /  AlkPhos  64     08 Nov 2019 07:52                            Radiology: Hospital Day:  7d    Subjective:    Pt was interviewed and examined at the bedside in the AM. No acute events overnight.   Denies any complaints in the AM. Going for IR PEG placement today.   doing same    Past Medical Hx:   HTN (hypertension)  Emphysematous COPD    Past Sx:  No significant past surgical history    Allergies:  No Known Allergies    Current Meds:   Standng Meds:  bisacodyl Suppository 10 milliGRAM(s) Rectal once  chlorhexidine 4% Liquid 1 Application(s) Topical <User Schedule>  folic acid 1 milliGRAM(s) Oral daily  influenza   Vaccine 0.5 milliLiter(s) IntraMuscular once  tamsulosin 0.4 milliGRAM(s) Oral at bedtime    PRN Meds:  acetaminophen   Tablet .. 650 milliGRAM(s) Oral every 6 hours PRN Mild Pain (1 - 3)  acetaminophen   Tablet .. 650 milliGRAM(s) Oral every 6 hours PRN Temp greater or equal to 38C (100.4F)  albuterol/ipratropium for Nebulization 3 milliLiter(s) Nebulizer every 6 hours PRN Shortness of Breath and/or Wheezing    HOME MEDICATIONS:  tamsulosin 0.4 mg oral capsule: 1 cap(s) orally once a day      Vital Signs:   T(F): 99.9 (11-08-19 @ 04:53), Max: 99.9 (11-08-19 @ 04:53)  HR: 112 (11-08-19 @ 04:53) (102 - 112)  BP: 111/56 (11-08-19 @ 04:53) (104/53 - 118/68)  RR: 20 (11-08-19 @ 04:53) (18 - 20)  SpO2: 94% (11-08-19 @ 08:02) (92% - 94%)      11-07-19 @ 07:01  -  11-08-19 @ 07:00  --------------------------------------------------------  IN: 1070 mL / OUT: 0 mL / NET: 1070 mL        Physical Exam:   CONSTITUTIONAL: Mal-nourished; in no acute distress, speaking in full sentences  HEAD: Normocephalic; atraumatic  EYES: PERRL, EOMI, no conjunctival erythema  NECK: Supple; non tender, FROM  CARD: +S1, S2 Regular rate and rhythm.  RESP: CTABL  ABD: soft ntnd, no rebound, no guarding, no rigidity  EXT: moves all extremities,  No clubbing, cyanosis or edema.   NEURO: Alert, grossly unremarkable, no focal deficits, cn ii-xii grossly intact        Labs:                         9.1    15.91 )-----------( 342      ( 08 Nov 2019 07:52 )             27.9     Neutophil% 77.4, Lymphocyte% 14.9, Monocyte% 6.6, Bands% 0.8 11-08-19 @ 07:52    08 Nov 2019 07:52    136    |  99     |  16     ----------------------------<  107    4.8     |  26     |  0.6      Ca    8.4        08 Nov 2019 07:52  Phos  3.6       08 Nov 2019 07:52  Mg     2.1       08 Nov 2019 07:52    TPro  6.5    /  Alb  2.7    /  TBili  0.7    /  DBili  x      /  AST  37     /  ALT  29     /  AlkPhos  64     08 Nov 2019 07:52                            Radiology:

## 2019-11-08 NOTE — PROGRESS NOTE ADULT - ASSESSMENT
79 year old male with PMHx of COPD, Interstitial lung disease, BPH presenting due to cough of 1 week duration.     #Failure to thrive with Severe Malnutrition  Seen by S&S on previous admission: Dysphagia 1  Replete lytes PRN   PEG to be placed today by IR  Monitor and educate family on using PEG once placed   Restart feeds when ok by IR     #COPD, with  Intersitial lung disease  CXR showing evidence of disease progression  Currently not wheezing, not in exacerbation  Duonebs q6 PRN    #Folic acid deficiency  On Folic acid supplement     DVT PPX: Lovenox  GI PPX: Not indicated  Activity: With assistance  Diet: tube feeds   Dispo: From home, lives with son Lizbet [phone] 79 year old male with PMHx of COPD, Interstitial lung disease, BPH presenting due to cough of 1 week duration.     #Failure to thrive with Severe Malnutrition  Seen by S&S on previous admission: Dysphagia 1  Replete lytes PRN   Gastrostomy tube placed by IR  Monitor and educate family on using PEG once placed   Restart feeds when ok by IR     #COPD, with  Intersitial lung disease  CXR showing evidence of disease progression  Currently not wheezing, not in exacerbation  Duonebs q6 PRN    #Folic acid deficiency  On Folic acid supplement     DVT PPX: Lovenox  GI PPX: Not indicated  Activity: With assistance  Diet: tube feeds   Dispo: From home, lives with son Lizbet [phone]

## 2019-11-08 NOTE — PROGRESS NOTE ADULT - SUBJECTIVE AND OBJECTIVE BOX
INTERVENTIONAL RADIOLOGY BRIEF-OPERATIVE NOTE    Procedure: Image guided gastrostomy catheter placement    Pre-Op Diagnosis: Failure to thrive    Post-Op Diagnosis: Same    Attending: Elliot Landau  Resident: None    Anesthesia (type):  [ ] General Anesthesia  [x] Sedation  [ ] Spinal Anesthesia  [x] Local/Regional    Contrast: 20 cc enterically    Estimated Blood Loss: < 5 cc    Condition:   [ ] Critical  [ ] Serious  [x] Fair   [ ] Good    Findings/Follow up Plan of Care: Placement of 18 [place] percutaneous gastrostomy catheter without complication. Patient tolerated procedure well. Catheter may be used tomorrow in AM.    Specimens Removed: None    Implants: Gastrostomy    Complications: None    Disposition: Return to unit      Please call Interventional Radiology [phone] with any questions, concerns, or issues.

## 2019-11-09 NOTE — PROGRESS NOTE ADULT - SUBJECTIVE AND OBJECTIVE BOX
Hospital Day:  8d    Subjective:    Pt was interviewed and examined at the bedside in the AM. No acute events overnight.   Denies any complaints in the AM. Restarting feeds today. Will monitor and dispo home tomorrow.     Past Medical Hx:   HTN (hypertension)  Emphysematous COPD    Past Sx:  No significant past surgical history    Allergies:  No Known Allergies    Current Meds:   Standng Meds:  bisacodyl Suppository 10 milliGRAM(s) Rectal once  chlorhexidine 4% Liquid 1 Application(s) Topical <User Schedule>  folic acid 1 milliGRAM(s) Oral daily  influenza   Vaccine 0.5 milliLiter(s) IntraMuscular once  tamsulosin 0.4 milliGRAM(s) Oral at bedtime    PRN Meds:  acetaminophen   Tablet .. 650 milliGRAM(s) Oral every 6 hours PRN Mild Pain (1 - 3)  acetaminophen   Tablet .. 650 milliGRAM(s) Oral every 6 hours PRN Temp greater or equal to 38C (100.4F)  albuterol/ipratropium for Nebulization 3 milliLiter(s) Nebulizer every 6 hours PRN Shortness of Breath and/or Wheezing    HOME MEDICATIONS:  tamsulosin 0.4 mg oral capsule: 1 cap(s) orally once a day      Vital Signs:   T(F): 97.6 (11-09-19 @ 05:18), Max: 99.7 (11-08-19 @ 20:46)  HR: 118 (11-09-19 @ 05:18) (95 - 118)  BP: 112/56 (11-09-19 @ 05:18) (101/55 - 130/60)  RR: 18 (11-09-19 @ 05:18) (18 - 18)  SpO2: 93% (11-08-19 @ 23:17) (93% - 93%)        Physical Exam:   CONSTITUTIONAL: Mal-nourished; in no acute distress, speaking in full sentences  HEAD: Normocephalic; atraumatic  EYES: PERRL, EOMI, no conjunctival erythema  NECK: Supple; non tender, FROM  CARD: +S1, S2 Regular rate and rhythm.  RESP: CTABL  ABD: soft ntnd, no rebound, no guarding, no rigidity  EXT: moves all extremities,  No clubbing, cyanosis or edema.   NEURO: Alert, grossly unremarkable, no focal deficits, cn ii-xii grossly intact        Labs:                         9.1    15.91 )-----------( 342      ( 08 Nov 2019 07:52 )             27.9       08 Nov 2019 07:52    136    |  99     |  16     ----------------------------<  107    4.8     |  26     |  0.6      Ca    8.4        08 Nov 2019 07:52  Phos  3.6       08 Nov 2019 07:52  Mg     2.1       08 Nov 2019 07:52    TPro  6.5    /  Alb  2.7    /  TBili  0.7    /  DBili  x      /  AST  37     /  ALT  29     /  AlkPhos  64     08 Nov 2019 07:52 Hospital Day:  8d    Subjective:  pt seen and examined, doing about same  Pt was interviewed and examined at the bedside in the AM. No acute events overnight.   Denies any complaints in the AM. Restarting feeds today. Will monitor and dispo home tomorrow.     Past Medical Hx:   HTN (hypertension)  Emphysematous COPD    Past Sx:  No significant past surgical history    Allergies:  No Known Allergies    Current Meds:   Standng Meds:  bisacodyl Suppository 10 milliGRAM(s) Rectal once  chlorhexidine 4% Liquid 1 Application(s) Topical <User Schedule>  folic acid 1 milliGRAM(s) Oral daily  influenza   Vaccine 0.5 milliLiter(s) IntraMuscular once  tamsulosin 0.4 milliGRAM(s) Oral at bedtime    PRN Meds:  acetaminophen   Tablet .. 650 milliGRAM(s) Oral every 6 hours PRN Mild Pain (1 - 3)  acetaminophen   Tablet .. 650 milliGRAM(s) Oral every 6 hours PRN Temp greater or equal to 38C (100.4F)  albuterol/ipratropium for Nebulization 3 milliLiter(s) Nebulizer every 6 hours PRN Shortness of Breath and/or Wheezing    HOME MEDICATIONS:  tamsulosin 0.4 mg oral capsule: 1 cap(s) orally once a day      Vital Signs:   T(F): 97.6 (11-09-19 @ 05:18), Max: 99.7 (11-08-19 @ 20:46)  HR: 118 (11-09-19 @ 05:18) (95 - 118)  BP: 112/56 (11-09-19 @ 05:18) (101/55 - 130/60)  RR: 18 (11-09-19 @ 05:18) (18 - 18)  SpO2: 93% (11-08-19 @ 23:17) (93% - 93%)        Physical Exam:   CONSTITUTIONAL: Mal-nourished; in no acute distress, speaking in full sentences  HEAD: Normocephalic; atraumatic  EYES: PERRL, EOMI, no conjunctival erythema  NECK: Supple; non tender, FROM  CARD: +S1, S2 Regular rate and rhythm.  RESP: CTABL  ABD: soft ntnd, no rebound, no guarding, no rigidity  EXT: moves all extremities,  No clubbing, cyanosis or edema.   NEURO: Alert, grossly unremarkable, no focal deficits, cn ii-xii grossly intact        Labs:                         9.1    15.91 )-----------( 342      ( 08 Nov 2019 07:52 )             27.9       08 Nov 2019 07:52    136    |  99     |  16     ----------------------------<  107    4.8     |  26     |  0.6      Ca    8.4        08 Nov 2019 07:52  Phos  3.6       08 Nov 2019 07:52  Mg     2.1       08 Nov 2019 07:52    TPro  6.5    /  Alb  2.7    /  TBili  0.7    /  DBili  x      /  AST  37     /  ALT  29     /  AlkPhos  64     08 Nov 2019 07:52

## 2019-11-09 NOTE — DISCHARGE NOTE PROVIDER - HOSPITAL COURSE
79 year old male with PMHx of COPD, Interstitial lung disease, BPH presenting due to cough of 1 week duration. Patient has been coughing, productive of green sputum. Over the last 3 days patient has been having decreased PO intake, felt feverish and decreased ability to ambulate. At baseline he is able to ambulate to bathroom on his own.    In ED patient was febrile 102.4.     Patient A0*3 but poor historian, contacted son via phone    Positive sick contact, son.    Patient with 2 previous hospitalizations for same complaint and failure to thrive. Lost 50lbs in 6 months. Was discharged on pureed diet 10/12/19.    Pt was re-evaluated by Speech and swallow, recommended dysphagia diet. GI was consulted for swallowing defect. After discussion with family, decided to place PEG tube for decreased PO intake and swallowing defect. Pt went for GI PEG placement however unable to place. IR was consulted. PEG tube was placed by IR. Pt started on feeds and tolerated well. Pt stable and ready for discharge with outpatient follow up.

## 2019-11-09 NOTE — CHART NOTE - NSCHARTNOTEFT_GEN_A_CORE
Registered Dietitian Follow-Up       Patient Profile Reviewed                           Yes [x]   No []       Nutrition History Previously Obtained        Yes [x]  No []         Pertinent Subjective Information: I spoke to the patient's family, who did not have any nutritional questions or concerns.        Pertinent Medical Interventions: Pt went for GI PEG placement however unable to place. IR was consulted. PEG tube was placed by IR. Pt started on feeds and tolerated well. Pt stable and ready for discharge with outpatient follow up.        Diet order: () TF with Jevity 1.2 at 240mL Q4hrs via PEG Tube       Anthropometrics:  - Ht: 5'6"  - Wt: 38.1kg  - %wt change  - BMI: 14  - IBW: 65kg        Pertinent Lab Data: () Na-136, K-4.8, CL-99, BUN-16, Cr-0.6, Glucose-107mg/dL, Corrected Ca-9.4 (WNL), H/H-9.1/27.9, WBC-15.91       Pertinent Meds: Dulcolax, Duoneb, Folic Acid       Physical Findings:  - Appearance: Asleep  - GI function: Per EMR, the patient had a bowel movement ()  - Tubes: PEG Tube  - Oral/Mouth cavity: N/A  - Skin: Stage I Pressure Injury-Coccyx; Unstageable Pressure Injury-Right hip (Fabián Score-13)       Nutrition Requirements  Weight Used: 38.1kg (Current weight)        Estimated Energy Needs    Continue []  Adjust [x]  Adjusted Energy Recommendations: 1143-1334kcal/day (30-35kcal/kg of current weight) -Due to PCM and pressure injury        Estimated Protein Needs    Continue []  Adjust [x]  Adjusted Protein Recommendations: 50-61grams/day (1.3-1.6grams/kg of current weight) -Due to PCM and pressure injury        Estimated Fluid Needs        Continue []  Adjust [x]  Adjusted Fluid Recommendations: 1143-1334mL/day (1mL/kcal)       Nutrient Intake: Current TF regimen provides (1728kcal, 80gm protein, 1166mL free H2O)        [x] Previous Nutrition Diagnosis: 1.Severe Protein-Calorie Malnutrition (ongoing); 2.Inadequate protein energy intake (resolved)             [x] Ongoing          [] Resolved      [] No active nutrition diagnosis identified at this time       Nutrition Diagnostic #1  Problem:  Etiology:  Statement:       Nutrition Diagnostic #2  Problem:  Etiology:  Statement:       Nutrition Intervention:  1.Enteral Nutrition  2.Vitamin and Mineral Supplement       Goal/Expected Outcome:  1.Meet 90%-100% estimated nutritional needs in 3 days       Indicator/Monitorin.Monitor diet order, energy intake, nutrition focused physical findings, anemia profile      Recommendations:  1.Recommend change the volume of TF with Jevity 1.2 to 5 cans daily (1440kcal, 67gm protein, 972mL free H2O)  2.Recommend provide a MVI Q24hrs  3.Recommend provide Vitamin C (500mg) Q12hrs  4.Recommend provide Zinc Sulfate (220mg) Q24hrs (14 days)

## 2019-11-09 NOTE — DISCHARGE NOTE PROVIDER - CARE PROVIDER_API CALL
Derek Jordan)  Medicine  [address] [place] [address]  Phone: [phone]  Fax: [phone]  Follow Up Time: 1 week    Tonny Blanco)  Gastroenterology  [address] [place] [address]  Phone: [phone]  Fax: [phone]  Follow Up Time: Routine

## 2019-11-09 NOTE — DISCHARGE NOTE PROVIDER - INSTRUCTIONS
Jevity 1.2 @ 240ml q 4 hours, HOLD 2AM FEEDS for total of 5 feeds/day. (this provides 1440 kcals, 66 gms protein, 972 ml free H2O)

## 2019-11-09 NOTE — DISCHARGE NOTE PROVIDER - NSDCCPCAREPLAN_GEN_ALL_CORE_FT
PRINCIPAL DISCHARGE DIAGNOSIS  Diagnosis: Swallowing problem  Assessment and Plan of Treatment: You were admitted for decreased oral intake and a swallowing defect. A PEG tube was placed for which nutrition can be provided. Please continue to give When nutrition access obtained, start Jevity 1.2 @ 240ml q 4 hours, HOLD 2AM FEEDS for total of 5 feeds/day. (this provides 1440 kcals, 66 gms protein, 972 ml free H2O)  Medications can be crushed and given through the PEG tube EXCEPT Flomax.   Please flush the tube with 25cc of water before and after tube feeds and after medications are given. If the tube becomes clogged and you are unable to flush it please return to the ED.   Please avoid pulling or removing the PEG tube. If the PEG tube becomes dislodged please return to the ED as soon as possible to avoid having the tract close and it can be replaced. It the tract closes a new one will have to placed by IR.  Please return to the hospital if there is any redness, warmth, swelling, fevers, chills, elevated temperature, abdominal pain, nasuea, vomiting, chest pain, shortness of breath.

## 2019-11-09 NOTE — DISCHARGE NOTE PROVIDER - PROVIDER TOKENS
PROVIDER:[TOKEN:[[id number]],FOLLOWUP:[1 week]],PROVIDER:[TOKEN:[[id number]],FOLLOWUP:[Routine]]

## 2019-11-09 NOTE — PROGRESS NOTE ADULT - ASSESSMENT
79 year old male with PMHx of COPD, Interstitial lung disease, BPH presenting due to cough of 1 week duration.     #Failure to thrive with Severe Malnutrition  Replete lytes PRN   Gastrostomy tube placed by IR  Monitor and educate family on using PEG   Starting feeds today as per dietitian     #COPD, with  Intersitial lung disease  CXR showing evidence of disease progression  Currently not wheezing, not in exacerbation  Duonebs q6 PRN    #Folic acid deficiency  On Folic acid supplement     DVT PPX: Lovenox  GI PPX: Not indicated  Activity: With assistance  Diet: tube feeds   Dispo: Home tomorrow 79 year old male with PMHx of COPD, Interstitial lung disease, BPH presenting due to cough of 1 week duration.     #Failure to thrive with Severe Malnutrition  Replete lytes PRN   Gastrostomy tube placed by IR  Monitor and educate family on using PEG   Starting feeds today as per dietitian   hope to d/c soon    #COPD, with  Intersitial lung disease but no acute exacerbation  CXR showing evidence of disease progression  Currently not wheezing, not in exacerbation  Duonebs q6 PRN    #Folic acid deficiency  On Folic acid supplement     DVT PPX: Lovenox  GI PPX: Not indicated  Activity: With assistance  Diet: tube feeds   Dispo: Home tomorrow

## 2019-11-09 NOTE — DISCHARGE NOTE PROVIDER - NSDCMRMEDTOKEN_GEN_ALL_CORE_FT
ipratropium-albuterol 0.5 mg-2.5 mg/3 mLinhalation solution: 3 milliliter(s) inhaled every 6 hours, As needed, Shortness of Breath and/or Wheezing  tamsulosin 0.4 mg oral capsule: 1 cap(s) orally once a day ipratropium-albuterol 0.5 mg-2.5 mg/3 mLinhalation solution: 3 milliliter(s) inhaled every 6 hours, As needed, Shortness of Breath and/or Wheezing

## 2019-11-10 NOTE — DISCHARGE NOTE NURSING/CASE MANAGEMENT/SOCIAL WORK - NSSCNAMETXT_GEN_ALL_CORE
[facility] [phone], [facility]  [phone]

## 2019-11-10 NOTE — DISCHARGE NOTE NURSING/CASE MANAGEMENT/SOCIAL WORK - PATIENT PORTAL LINK FT
You can access the FollowMyHealth Patient Portal offered by [facility] by registering at the following website: [email or web address]. By joining [facility]’s FollowMyHealth portal, you will also be able to view your health information using other applications (apps) compatible with our system.

## 2019-11-11 NOTE — PROGRESS NOTE ADULT - SUBJECTIVE AND OBJECTIVE BOX
Name: KEN RAMIREZ  Age: 79y  Gender: Male    Pt was seen and examined.   doing about same    Allergies:  No Known Allergies      PHYSICAL EXAM:    Vitals:  T(C): 35.8 (11-10-19 @ 20:36), Max: 37.2 (11-10-19 @ 14:41)  HR: 113 (11-10-19 @ 20:36) (113 - 127)  BP: 88/52 (11-10-19 @ 20:36) (88/52 - 115/59)  RR: 18 (11-10-19 @ 20:36) (18 - 20)  SpO2: --  Wt(kg): --Vital Signs Last 24 Hrs  T(C): 35.8 (10 Nov 2019 20:36), Max: 37.2 (10 Nov 2019 14:41)  T(F): 96.5 (10 Nov 2019 20:36), Max: 98.9 (10 Nov 2019 14:41)  HR: 113 (10 Nov 2019 20:36) (113 - 127)  BP: 88/52 (10 Nov 2019 20:36) (88/52 - 115/59)  BP(mean): --  RR: 18 (10 Nov 2019 20:36) (18 - 20)  SpO2: --      NECK: Supple, No JVD  CHEST/LUNG: CTA, B/L, No rales, rhonchi, wheezing  HEART: S1,S2, N1 Regular rate and rhythm; No murmurs, rubs, or gallops  ABDOMEN: Soft, Nontender, Nondistended; Bowel sounds present  EXTREMITIES:  2+ Peripheral Pulses, No clubbing, cyanosis, or edema      LABS:                        8.4    10.89 )-----------( 396      ( 10 Nov 2019 07:27 )             25.9     11-10    141  |  101  |  24<H>  ----------------------------<  168<H>  4.3   |  26  |  0.6<L>    Ca    8.6      10 Nov 2019 07:27  Phos  3.5     11-10  Mg     2.0     11-10    TPro  6.4  /  Alb  2.6<L>  /  TBili  0.4  /  DBili  x   /  AST  71<H>  /  ALT  40  /  AlkPhos  65  11-10    LIVER FUNCTIONS - ( 10 Nov 2019 07:27 )  Alb: 2.6 g/dL / Pro: 6.4 g/dL / ALK PHOS: 65 U/L / ALT: 40 U/L / AST: 71 U/L / GGT: x                 MEDICATIONS  (STANDING):  bisacodyl Suppository 10 milliGRAM(s) Rectal once  chlorhexidine 4% Liquid 1 Application(s) Topical <User Schedule>  folic acid 1 milliGRAM(s) Oral daily  influenza   Vaccine 0.5 milliLiter(s) IntraMuscular once  tamsulosin 0.4 milliGRAM(s) Oral at bedtime        RADIOLOGY & ADDITIONAL TESTS:    Imaging Personally Reviewed:  [ ] YES  [ ] NO    A/P:  Assessment and Plan:   · Assessment		  79 year old male with PMHx of COPD, Interstitial lung disease, BPH presenting due to cough of 1 week duration.     #Failure to thrive with Severe Malnutrition  Replete lytes PRN   Gastrostomy tube placed by IR  Monitor and educate family on using PEG   Starting feeds today as per dietitian   hope to d/c soon    #COPD, with  Intersitial lung disease but no acute exacerbation  CXR showing evidence of disease progression  Currently not wheezing, not in exacerbation  Duonebs q6 PRN    #Folic acid deficiency  On Folic acid supplement     DVT PPX: Lovenox  GI PPX: Not indicated  Activity: With assistance  Diet: tube feeds   Dispo: Home tomorrow

## 2019-11-11 NOTE — PHYSICAL THERAPY INITIAL EVALUATION ADULT - GENERAL OBSERVATIONS, REHAB EVAL
9:55-10:04 Pt encountered semifowler in bed in NAD. + PEG. Gaz PCA present (Fijian speaking). Pt declined OOB at this time 2* to abdominal discomfort but agreeable for PT later in a.m. Will f/u.
10:45-11:15 Pt encountered supine in bed in NAD. + PEG with abdominal binder.  Attempted to use [place]  #c [id number] but pt had difficulty following instructions.

## 2019-11-15 NOTE — ED PROVIDER NOTE - CRITICAL CARE PROVIDED
additional history taking/interpretation of diagnostic studies/consultation with other physicians/conducted a detailed discussion of DNR status/documentation/direct patient care (not related to procedure)

## 2019-11-15 NOTE — ED ADULT TRIAGE NOTE - CHIEF COMPLAINT QUOTE
He had a high fever, chills, a lot of phlegm. He was recently discharged from the hospital - daughter-in law

## 2019-11-15 NOTE — ED ADULT NURSE NOTE - NSIMPLEMENTINTERV_GEN_ALL_ED
Implemented All Fall with Harm Risk Interventions:  [place] to call system. Call bell, personal items and telephone within reach. Instruct patient to call for assistance. Room bathroom lighting operational. Non-slip footwear when patient is off stretcher. Physically safe environment: no spills, clutter or unnecessary equipment. Stretcher in lowest position, wheels locked, appropriate side rails in place. Provide visual cue, wrist band, yellow gown, etc. Monitor gait and stability. Monitor for mental status changes and reorient to person, place, and time. Review medications for side effects contributing to fall risk. Reinforce activity limits and safety measures with patient and family. Provide visual clues: red socks.

## 2019-11-15 NOTE — ED ADULT NURSE NOTE - PLAN OF CARE
Cut to l hand/palm, 0 active bleeding, approx. 3mm long, depth unknown. pt able to move all digits, pulses palp, cap. Refill brisk, 0 numbness, 0 tingling. Pt medicated per orders, anatoliy. Well. Pt a&ox4, 0 pain, 0 c/o, 0 distress, msps' intact.      Elizabeth Ayala, RN  04/03/19 9491 Fall precautions/Side rails/Call bell/Position of comfort/Bedside visitors

## 2019-11-15 NOTE — ED PROVIDER NOTE - PHYSICAL EXAMINATION
CONSTITUTIONAL: cachectic appearing, in no acute distress  SKIN: warm, dry, no rash, cap refill < 2 seconds  HEENT: normocephalic, atraumatic, no conjunctival erythema, dry mucous membranes, patent airway  NECK: supple, no masses  CV:  tachycardic rate, 2+ radial pulses bilaterally  RESP: L > R rhonchi, tachypneic, increased work of breathing  ABD: soft, nontender, nondistended, no rebound, no guarding, PEG site clean/dry/intact without erythema/warmth to touch/swelling  MSK: normal ROM, no cyanosis, no edema  NEURO: alert, oriented to self, grossly unremarkable  PSYCH: cooperative, appropriate

## 2019-11-15 NOTE — ED PROVIDER NOTE - OBJECTIVE STATEMENT
80 yo M with PMHx of COPD (never been on home O2) who presents with fever Tmax 102.7 x 5 days. Pt was admitted 10/31-11/11 for sepsis 2/2 pneumonia and failure to thrive. Had PEG tube placed 11/6 by Dr. Meredith (GI) and was d/c'ed home 4 days ago. Family reports that pt was febrile when d/c'ed and continued to be febrile. Also noted to have productive cough and sob. No vomiting, abd pain, diarrhea, foul smelling urine. A&Ox1 at baseline. Full code. Further hx limited 2/2 pt being a poor historian. 78 yo M with PMHx of COPD (never been on home O2) who presents with fever Tmax 102.7 x 5 days. Pt was admitted 10/31-11/11 for sepsis 2/2 pneumonia and failure to thrive. Had PEG tube placed 11/6 by Dr. Meredith (GI) and was d/c'ed home 4 days ago. Family reports that pt was febrile when d/c'ed and continued to be febrile. Also noted to have productive cough and sob. No vomiting, abd pain, diarrhea, foul smelling urine. A&Ox1 at baseline. Further hx limited 2/2 pt being a poor historian.

## 2019-11-15 NOTE — ED PROVIDER NOTE - CLINICAL SUMMARY MEDICAL DECISION MAKING FREE TEXT BOX
I personally evaluated the patient. I reviewed the Resident’s or Physician Assistant’s note (as assigned above), and agree with the findings and plan except as documented in my note. Patient not protecting airway, patient sons bedside, signed dnr dni, do not want intubation, understand risks and benefits, patient not a candidate for ct scan initially due to inability marito lay flay, umu later became candidate, patient imaging followed up by inpatient team, dominguez discussed with icu, not a candidate at this side

## 2019-11-15 NOTE — ED PROVIDER NOTE - PROGRESS NOTE DETAILS
TC: 78 yo M with PMHx of COPD (never been on home O2) who presents with fever and productive cough x 4 days. Tachycardic 130s-140s here, febrile 100.6, satting 90% on RA. Placed on NRB. Ordered sepsis workup, cxr, ekg. TC: Sepsis suspected at this time. Given 30cc/kg LR. Ordered cefepime, levaquin for presumed HCAP. TC: Labs notable for WBC 22. Cxr not significantly changed from prior. Pt still tachy 120s, SBP 100s. Added CTA chest/abd to r/o PE. TC: Hu Lau ([phone]) signed DNR/DNI. Placed MOLST form in scanner. TC: Pt taken to CT and brought back. Not stable for CT as pt desats when lying flat. Son Hugo Lau ([phone]) signed DNR/DNI. Placed MOLST form in scanner. TC: Spoke to Dr. Jordan who stated that he will no longer care for this pt as his primary care physician, requested admission under hospitalist.

## 2019-11-15 NOTE — ED ADULT NURSE NOTE - OBJECTIVE STATEMENT
PT brought in by family, as per son pt had G-tube inserted 11/6, Dc from hospital 4 days ago, c/o fever x few days. Also noted cough with flem, SOB on arrival.  code Sepsis activated. PT brought in by family, as per son pt had PEG-tube inserted 11/6, Dc from hospital 4 days ago, c/o fever x few days. Also noted cough with flem, SOB on arrival.  code Sepsis activated.

## 2019-11-16 NOTE — CONSULT NOTE ADULT - ASSESSMENT
79y old Male with a past medical history of COPD (quit smoking at 58 yrs, not on home O2), ILD, BPH and dysphagia (patient has swallowing defect as evaluated on last admission, requiring PEG insertion for adequate nutrition). Patient is presenting for fever and SOB. CT angio chest showed LLL opacities, enlarged mediastinal lymph nodes and possible post-obstructive pneumnia.     IMPRESSION:  Bibasilar GNR/ORSA PNA  -atypical Mycobacteria in DDX  -chronic ILD  -possible aspiration    RECOMMENDATIONS:  Vancomycin 1 gm iv q12h  Cefepime 2 gm iv q12h  D/c other ABx

## 2019-11-16 NOTE — CHART NOTE - NSCHARTNOTEFT_GEN_A_CORE
Seen by my partner today.    Tx as PNA. Spoke w/ pt today w/ RN, Hillary, for Tajik trans. Pt doing well    CXR + LLL infil; LLL crackles on exam    f/u ID recs    corrected PEG fluid and feeds w/ residents for high Na.    Prob d/c home in 48-72 hrs, when fever and wbc better. Pt no longer requiring O2.

## 2019-11-16 NOTE — PROGRESS NOTE ADULT - SUBJECTIVE AND OBJECTIVE BOX
KEN RAMIREZ 79y Male  MRN#: [id number]     SUBJECTIVE  Patient is a 79y old Male who presents with a chief complaint of Sepsis   Currently admitted to medicine with the primary diagnosis of sepsis due to pneumonia  NOTE: Icelandic speaking, RN for translation (specific Icelandic dialect)     Today is hospital day , and this morning he reports some shortness of breath. Otherwise no complaints.     OBJECTIVE  PAST MEDICAL & SURGICAL HISTORY  Emphysematous COPD  Status post insertion of percutaneous endoscopic gastrostomy (PEG) tube    ALLERGIES:  No Known Allergies    MEDICATIONS:  STANDING MEDICATIONS  albuterol/ipratropium for Nebulization 3 milliLiter(s) Nebulizer every 6 hours  enoxaparin Injectable 40 milliGRAM(s) SubCutaneous daily  influenza   Vaccine 0.5 milliLiter(s) IntraMuscular once  levoFLOXacin IVPB 750 milliGRAM(s) IV Intermittent every 24 hours  meropenem  IVPB 1000 milliGRAM(s) IV Intermittent every 8 hours  methylPREDNISolone sodium succinate Injectable 40 milliGRAM(s) IV Push two times a day  multivitamin 1 Tablet(s) Oral daily  vancomycin  IVPB      vancomycin  IVPB 1000 milliGRAM(s) IV Intermittent every 12 hours    PRN MEDICATIONS      VITAL SIGNS: Last 24 Hours  T(C): 35.8 (16 Nov 2019 05:18), Max: 39.1 (15 Nov 2019 22:50)  T(F): 96.5 (16 Nov 2019 05:18), Max: 102.3 (15 Nov 2019 22:50)  HR: 91 (16 Nov 2019 05:18) (91 - 146)  BP: 96/61 (16 Nov 2019 05:18) (96/61 - 132/64)  BP(mean): 5 (15 Nov 2019 23:15) (5 - 5)  RR: 20 (16 Nov 2019 05:18) (20 - 24)  SpO2: 99% (16 Nov 2019 07:55) (90% - 100%)    LABS:                 RADIOLOGY:      PHYSICAL EXAM:    GENERAL: NAD, well-developed, AAOx2, thin   HEENT:  Atraumatic, Normocephalic. EOMI, PERRLA, conjunctiva and sclera clear, No JVD  PULMONARY: decreased air entry of left lower lobe, crackles appreciated. No wheezing  CARDIOVASCULAR: Regular rate and rhythm; No murmurs, rubs, or gallops  GASTROINTESTINAL: Soft, Nontender, PEG in place and site looks clean  MUSCULOSKELETAL:  2+ Peripheral Pulses, No clubbing, cyanosis, or edema  NEUROLOGY: non-focal  SKIN: No rashes or lesions      ASSESSMENT & PLAN  Patient is a 79y old Male with a past medical history of COPD (quit smoking at 58 yrs, not on home O2), ILD, BPH and dysphagia (patient has swallowing defect as evaluated on last admission, requiring PEG insertion for adequate nutrition). Patient is presenting for fever and SOB. CT angio chest showed LLL opacities, enlarged mediastinal lymph nodes and possible post-obstructive pneumnia. Patient admitted for sepsis and pneumonia      Infectious disease: pneumonia with sepsis (fever and WBC=22)  - on Meropenem and Vancomycin, levofloxacin (all start date=11/16)  - need to cover for HAP in the setting of recent hospitalization   - would consider d/c levofloxacin   - weened off oxygen, spo2=99%    Pulmo: (1) COPD  - nebulizers for now. Stable  (2) ILD  - stable    Nephro: hypernatremia due to dehydration (in the setting of fever)  - Free water by PEG, 1L total in 24 hrs (200ml/ meal)  - PEG feeding 5x/day  - check bmp at 8pm to avoid overcorrecting     Nutrition: dysphagia --> PEG tube  - continue PEG feedings. PEG site is clean    DVT prophylaxis: lovenox  Diet: by PEG  Dispo: home  Code: DNR DNI KEN RAMIREZ 79y Male  MRN#: [id number]     SUBJECTIVE  Patient is a 79y old Male who presents with a chief complaint of Sepsis   Currently admitted to medicine with the primary diagnosis of sepsis due to pneumonia  NOTE: Serbian speaking, RN for translation (specific Serbian dialect)     Today is hospital day , and this morning he reports some shortness of breath. Otherwise no complaints.     OBJECTIVE  PAST MEDICAL & SURGICAL HISTORY  Emphysematous COPD  Status post insertion of percutaneous endoscopic gastrostomy (PEG) tube    ALLERGIES:  No Known Allergies    MEDICATIONS:  STANDING MEDICATIONS  albuterol/ipratropium for Nebulization 3 milliLiter(s) Nebulizer every 6 hours  enoxaparin Injectable 40 milliGRAM(s) SubCutaneous daily  influenza   Vaccine 0.5 milliLiter(s) IntraMuscular once  levoFLOXacin IVPB 750 milliGRAM(s) IV Intermittent every 24 hours  meropenem  IVPB 1000 milliGRAM(s) IV Intermittent every 8 hours  methylPREDNISolone sodium succinate Injectable 40 milliGRAM(s) IV Push two times a day  multivitamin 1 Tablet(s) Oral daily  vancomycin  IVPB      vancomycin  IVPB 1000 milliGRAM(s) IV Intermittent every 12 hours    PRN MEDICATIONS      VITAL SIGNS: Last 24 Hours  T(C): 35.8 (16 Nov 2019 05:18), Max: 39.1 (15 Nov 2019 22:50)  T(F): 96.5 (16 Nov 2019 05:18), Max: 102.3 (15 Nov 2019 22:50)  HR: 91 (16 Nov 2019 05:18) (91 - 146)  BP: 96/61 (16 Nov 2019 05:18) (96/61 - 132/64)  BP(mean): 5 (15 Nov 2019 23:15) (5 - 5)  RR: 20 (16 Nov 2019 05:18) (20 - 24)  SpO2: 99% (16 Nov 2019 07:55) (90% - 100%)    LABS:                        8.2    18.18 )-----------( 309      ( 16 Nov 2019 09:10 )             26.4   11-16    149<H>  |  109  |  31<H>  ----------------------------<  136<H>  3.4<L>   |  28  |  0.6<L>    Ca    8.3<L>      16 Nov 2019 09:10  Phos  3.5     11-16  Mg     2.1     11-16    TPro  6.4  /  Alb  2.4<L>  /  TBili  0.5  /  DBili  x   /  AST  37  /  ALT  27  /  AlkPhos  69  11-16             RADIOLOGY:      PHYSICAL EXAM:    GENERAL: NAD, well-developed, AAOx2, thin   HEENT:  Atraumatic, Normocephalic. EOMI, PERRLA, conjunctiva and sclera clear, No JVD  PULMONARY: decreased air entry of left lower lobe, crackles appreciated. No wheezing  CARDIOVASCULAR: Regular rate and rhythm; No murmurs, rubs, or gallops  GASTROINTESTINAL: Soft, Nontender, PEG in place and site looks clean  MUSCULOSKELETAL:  2+ Peripheral Pulses, No clubbing, cyanosis, or edema  NEUROLOGY: non-focal  SKIN: No rashes or lesions      ASSESSMENT & PLAN  Patient is a 79y old Male with a past medical history of COPD (quit smoking at 58 yrs, not on home O2), ILD, BPH and dysphagia (patient has swallowing defect as evaluated on last admission, requiring PEG insertion for adequate nutrition). Patient is presenting for fever and SOB. CT angio chest showed LLL opacities, enlarged mediastinal lymph nodes and possible post-obstructive pneumnia. Patient admitted for sepsis and pneumonia      Infectious disease: pneumonia with sepsis (fever and WBC=22)  - on Meropenem and Vancomycin, levofloxacin (all start date=11/16)  - need to cover for HAP in the setting of recent hospitalization   - would consider d/c levofloxacin   - weened off oxygen, spo2=99%    Pulmo: (1) COPD  - nebulizers for now. Stable  (2) ILD  - stable    Nephro: hypernatremia due to dehydration (in the setting of fever)  - Free water by PEG, 1L total in 24 hrs (200ml/ meal)  - PEG feeding 5x/day  - check bmp at 8pm to avoid overcorrecting     Nutrition: dysphagia --> PEG tube  - continue PEG feedings. PEG site is clean    DVT prophylaxis: lovenox  Diet: by PEG  Dispo: home  Code: DNR DNI KEN RAMIREZ 79y Male  MRN#: [id number]     SUBJECTIVE  Patient is a 79y old Male who presents with a chief complaint of Sepsis   Currently admitted to medicine with the primary diagnosis of sepsis due to pneumonia  NOTE: Thai speaking, RN for translation (specific Thai dialect)     Today is hospital day , and this morning he reports some shortness of breath. Otherwise no complaints.     OBJECTIVE  PAST MEDICAL & SURGICAL HISTORY  Emphysematous COPD  Status post insertion of percutaneous endoscopic gastrostomy (PEG) tube    ALLERGIES:  No Known Allergies    MEDICATIONS:  STANDING MEDICATIONS  albuterol/ipratropium for Nebulization 3 milliLiter(s) Nebulizer every 6 hours  enoxaparin Injectable 40 milliGRAM(s) SubCutaneous daily  influenza   Vaccine 0.5 milliLiter(s) IntraMuscular once  levoFLOXacin IVPB 750 milliGRAM(s) IV Intermittent every 24 hours  meropenem  IVPB 1000 milliGRAM(s) IV Intermittent every 8 hours  methylPREDNISolone sodium succinate Injectable 40 milliGRAM(s) IV Push two times a day  multivitamin 1 Tablet(s) Oral daily  vancomycin  IVPB      vancomycin  IVPB 1000 milliGRAM(s) IV Intermittent every 12 hours    PRN MEDICATIONS      VITAL SIGNS: Last 24 Hours  T(C): 35.8 (16 Nov 2019 05:18), Max: 39.1 (15 Nov 2019 22:50)  T(F): 96.5 (16 Nov 2019 05:18), Max: 102.3 (15 Nov 2019 22:50)  HR: 91 (16 Nov 2019 05:18) (91 - 146)  BP: 96/61 (16 Nov 2019 05:18) (96/61 - 132/64)  BP(mean): 5 (15 Nov 2019 23:15) (5 - 5)  RR: 20 (16 Nov 2019 05:18) (20 - 24)  SpO2: 99% (16 Nov 2019 07:55) (90% - 100%)    LABS:                        8.2    18.18 )-----------( 309      ( 16 Nov 2019 09:10 )             26.4   11-16    149<H>  |  109  |  31<H>  ----------------------------<  136<H>  3.4<L>   |  28  |  0.6<L>    Ca    8.3<L>      16 Nov 2019 09:10  Phos  3.5     11-16  Mg     2.1     11-16    TPro  6.4  /  Alb  2.4<L>  /  TBili  0.5  /  DBili  x   /  AST  37  /  ALT  27  /  AlkPhos  69  11-16             RADIOLOGY:      PHYSICAL EXAM:    GENERAL: NAD, well-developed, AAOx2, thin   HEENT:  Atraumatic, Normocephalic. EOMI, PERRLA, conjunctiva and sclera clear, No JVD  PULMONARY: decreased air entry of left lower lobe, crackles appreciated. No wheezing  CARDIOVASCULAR: Regular rate and rhythm; No murmurs, rubs, or gallops  GASTROINTESTINAL: Soft, Nontender, PEG in place and site looks clean  MUSCULOSKELETAL:  2+ Peripheral Pulses, No clubbing, cyanosis, or edema  NEUROLOGY: non-focal  SKIN: No rashes or lesions      ASSESSMENT & PLAN  Patient is a 79y old Male with a past medical history of COPD (quit smoking at 58 yrs, not on home O2), ILD, BPH and dysphagia (patient has swallowing defect as evaluated on last admission, requiring PEG insertion for adequate nutrition). Patient is presenting for fever and SOB. CT angio chest showed LLL opacities, enlarged mediastinal lymph nodes and possible post-obstructive pneumnia. Patient admitted for sepsis and pneumonia      Infectious disease: pneumonia with sepsis (fever and WBC=22)  - on Meropenem and Vancomycin, levofloxacin (all start date=11/16). Will d/c maryanne and start cefepime (no need for ESBL coverage). Vanco trough orderd for tomorrow morning   - need to cover for HAP in the setting of recent hospitalization   - would consider d/c levofloxacin as it provides no additional antimicrobial coverage.   - weened off oxygen, spo2=99%    Pulmo: (1) COPD  - nebulizers for now. Stable  (2) ILD  - stable    Nephro: hypernatremia due to dehydration (in the setting of fever)  - Free water by PEG, 1L total in 24 hrs (200ml/ meal)  - PEG feeding 5x/day  - check bmp at 8pm to avoid overcorrecting     Nutrition: dysphagia --> PEG tube  - continue PEG feedings. PEG site is clean    DVT prophylaxis: lovenox  Diet: by PEG  Dispo: home  Code: DNR DNI

## 2019-11-16 NOTE — H&P ADULT - ASSESSMENT
79 year old male with PMHx of COPD, Interstitial lung disease, BPH, Dysphagia s/p PEG presenting due to fever and altered mental status.      #) Sepsis secondary to Diffuse Interstitial Pneumonia, COPD Exacerbation,  Intersitial lung disease  -fever to 102, tachycardia, leukocytosis  -CT Chest with - possible Neoplastic process with a post obstructive pneumonia, pneumomediastinum.  -CXR appears similar to last  -Due to recent admission will cover with Vancomycin, Meropenem and Levaquin   -o2 , duonebs,  steroids (taper upon improvement)  -bcx, sputum cx, u/a UCx    #) Failure to thrive with Severe Malnutrition  -Replete lytes PRN   -s/p PEG  -Resume Tube feeds from last admission     DVT PPX: Lovenox    GI PPX: Not indicated    Activity: With assistance    Diet: tube feeds     Dispo: Acute 79 year old male with PMHx of COPD, Interstitial lung disease, BPH, Dysphagia s/p PEG presenting due to fever and altered mental status.      #) Sepsis secondary to Diffuse Interstitial Pneumonia, COPD Exacerbation,  Intersitial lung disease  -fever to 102, tachycardia, leukocytosis  -CT Chest with - possible Neoplastic process with a post obstructive pneumonia, pneumomediastinum.  -CXR appears similar to last  -Due to recent admission will cover with Vancomycin, Meropenem and Levaquin   -o2 , duonebs,  steroids (taper upon improvement)  -bcx, sputum cx, u/a UCx    #) Failure to thrive with Severe Malnutrition  -Replete lytes PRN   -s/p PEG  -Resume Tube feeds from last admission     DVT PPX: Lovenox    GI PPX: Pantoprazole     Activity: With assistance    Diet: tube feeds     Dispo: Acute 79 year old male with PMHx of COPD, Interstitial lung disease, BPH, Dysphagia s/p PEG presenting due to fever and altered mental status.      #) Sepsis secondary to Diffuse Interstitial Pneumonia, COPD Exacerbation,  Intersitial lung disease  -fever to 102, tachycardia, leukocytosis  -CT Chest with - possible Neoplastic process with a post obstructive pneumonia, pneumomediastinum.  -CXR appears similar to last  -Due to recent admission will cover with Vancomycin, Meropenem and Levaquin   -o2 , duonebs,  steroids (taper upon improvement)  -bcx, sputum cx, u/a UCx    #) Hypernatremia  -Likely due to dehydration  -Will start D5  -f/u BMP     #) Failure to thrive with Severe Malnutrition  -Replete lytes PRN   -s/p PEG  -Resume Tube feeds from last admission     DVT PPX: Lovenox    GI PPX: Pantoprazole     Activity: With assistance    Diet: tube feeds     Dispo: Acute 79 year old male with PMHx of COPD, Interstitial lung disease, BPH, Dysphagia s/p PEG presenting due to fever and altered mental status.      #) Sepsis secondary to Diffuse Interstitial Pneumonia, COPD Exacerbation,  Intersitial lung disease  -fever to 102, tachycardia, leukocytosis  -CT Chest with - possible Neoplastic process with a post obstructive pneumonia, pneumomediastinum.  -CXR appears similar to last  -Due to recent admission will cover with Vancomycin, Meropenem and Levaquin   -o2 , duonebs,  steroids (taper upon improvement)  -bcx, sputum cx, u/a UCx    #) Hypernatremia  -Likely cause of HAGMA  -Likely due to dehydration  -Will start D5  -f/u BMP     #) Failure to thrive with Severe Malnutrition  -Replete lytes PRN   -s/p PEG  -Resume Tube feeds from last admission     DVT PPX: Lovenox    GI PPX: Pantoprazole     Activity: With assistance    Diet: tube feeds     Dispo: Acute

## 2019-11-16 NOTE — H&P ADULT - NSHPLABSRESULTS_GEN_ALL_CORE
=======================================================    Labs:  11-15    149<H>  |  106  |  44<H>  ----------------------------<  296<H>  4.8   |  24  |  0.9    Ca    9.0      15 Nov 2019 22:45    TPro  8.0  /  Alb  2.9<L>  /  TBili  0.5  /  DBili  x   /  AST  46<H>  /  ALT  34  /  AlkPhos  89  11-15                          9.9    22.78 )-----------( 412      ( 15 Nov 2019 22:45 )             32.2       PT/INR - ( 15 Nov 2019 22:45 )   PT: 18.60 sec;   INR: 1.63 ratio         PTT - ( 15 Nov 2019 22:45 )  PTT:31.8 sec  Urinalysis Basic - ( 2019 01:11 )    Color: Yellow / Appearance: Clear / S.026 / pH: x  Gluc: x / Ketone: Negative  / Bili: Negative / Urobili: 6 mg/dL   Blood: x / Protein: 100 mg/dL / Nitrite: Negative   Leuk Esterase: Negative / RBC: 91 /HPF / WBC 12 /HPF   Sq Epi: x / Non Sq Epi: 6 /HPF / Bacteria: Negative      LIVER FUNCTIONS - ( 15 Nov 2019 22:45 )  Alb: 2.9 g/dL / Pro: 8.0 g/dL / ALK PHOS: 89 U/L / ALT: 34 U/L / AST: 46 U/L / GGT: x               CAPILLARY BLOOD GLUCOSE            RECENT CULTURES:   @ 06:30 .Urine Clean Catch (Midstream)     <10,000 CFU/mL Normal Urogenital Marcy       CT ANGIO CHEST (W)AW IC            PROCEDURE DATE:  2019        IMPRESSION:    1.  No evidence of central or segmental pulmonary emboli.    2.  Small volume pneumomediastinum.    3.  Apparent soft tissue density in the region of the left hilum with   cutoff of the left lower lobe bronchus and patchy bilateral lower lobe   airspace opacities and few mildly enlarged mediastinal lymph nodes. This   is new since 2019 and an underlying neoplastic process with a post   obstructive pneumonia cannot be ruled out. Further evaluation with   bronchoscopy may be of use.      ******PRELIMINARY REPORT******    ******PRELIMINARY REPORT******          EXAM:  CT ABDOMEN AND PELVIS IC            PROCEDURE DATE:  2019          IMPRESSION:     1.  No CT evidence of acute abdominopelvic pathology.     2.  PEG tube balloon within the stomach.    3.  Left parapelvic cyst containing a nonobstructing 1.0 x 0.9 x 1.0 cm   calculus. No hydroureteronephrosis.

## 2019-11-16 NOTE — H&P ADULT - ATTENDING COMMENTS
*Pt with AMS 2/2 acute medical condition. HX and physical will be limited. Supplemental information obtained from family at bedside, ED staff, and EMR.     80 YO M with a PMHx of COPD, Interstitial lung disease, BPH, Dysphagia s/p PEG  placement during most recent admission who was BIBEMS for AMS. The pt was recently admitted for pneumonia sepsis and had a prolongeed course in the hospital before being discharged home. Pt's family states that he is having productive cough and subjective fevers at home. In the ED, pt was code sepsis and started on IVFs and IV ABXs (Cefepime and Levofloxacin). A CTA-CAP was negative for acute process. DNR/DNI paperwork was completed by ED staff with the pt's family. Physical exam with a pt that will only respond to painful stimuli. A&Ox0. Lungs with course rales throughout all lung fields. Rhonchi in the LLL. PEG tube with clean insertion site and no surrounding erythema or redness and no TTP. Labs and radiology as above. Sepsis (resolved) likely 2/2 HCAP, likely gram negative 2/2 post-obstructive process. Admit to floors. C/w IV ABxs (Meropenem and Vancomycin). C/w IVFs. Obtain blood and sputum cultures. Send urine legionella. RSV/Flu swab. Pulm Consult for possible bronch. Small volume Pneumomediastinum. Pulm consult. Mixed picture: HAGMA + Metabolic alkalosis. C/w IVFs. Repeat BMP in the AM. Hypernatremia likely 2/2 severe dehydration. Will need to r/o Legionella. C/w IVFs (D5). Repeat BMP in the AM. Leukocytosis 2/2 above. Management as per above. HX of ILD and COPD. PRN and standing duonebs. IV steroids. GI and DVT PPX. DNR/DNI. Rest as per above note.

## 2019-11-16 NOTE — H&P ADULT - NSHPPHYSICALEXAM_GEN_ALL_CORE
GENERAL: Lethargic, Cachectic,  AAOx0 (Asked name in [employer], did not respond).   HEENT:  Atraumatic, Normocephalic. EOMI, PERRLA, conjunctiva clear, sclera white, No JVD  PULMONARY: Diffuse rhonchi   CARDIOVASCULAR: Regular rate and rhythm; No murmurs, rubs, or gallops  GASTROINTESTINAL: Soft, Nontender, Nondistended; PEG site clean and dry  MUSCULOSKELETAL:  2+ Peripheral Pulses, No clubbing, cyanosis, or edema  NEUROLOGY: Non focal  SKIN: No rashes or lesions

## 2019-11-16 NOTE — H&P ADULT - HISTORY OF PRESENT ILLNESS
79 year old male with PMHx of COPD, Interstitial lung disease, BPH, Dysphagia s/p PEG  Pt was recently admitted 10/31-11/11 for sepsis secondary to pneumonia and failure to thrive. He had a PEG tube placed on 11/6 by Dr. Meredith (GI) and was d/c'ed home 4 days ago. His family reported that pt was febrile when he was discharged and continued to be febrile. (though his last recorded temperature was 96.0) .  He was also noted to have productive cough. He is unable to provide history at this time, contact family in AM. In the ED he was Given 2.5L LR, Cefepime, and Levaquin for presumed HCAP.  WBC 22, he was tachycardic to the 120s,  so CTA chest/abd was ordered to r/o PE. 79 year old male with PMHx of COPD, Interstitial lung disease, BPH, Dysphagia s/p PEG  Pt was recently admitted 10/31-11/11 for sepsis secondary to pneumonia and failure to thrive. He had a PEG tube placed on 11/6 by Dr. Meredith (GI) and was d/c'ed home 4 days ago. His family reported that pt was febrile when he was discharged and continued to be febrile. (though his last recorded temperature was 96.0) .  He was also noted to have productive cough. He is unable to provide history at this time, contact family in AM. In the ED per sepsis protocol,  2.5L LR, Cefepime, and Levaquin for were given for presumed HCAP.  WBC 22, he was tachycardic to the 120s,  so CTA chest/abd was ordered to r/o PE and was negative.

## 2019-11-16 NOTE — CONSULT NOTE ADULT - SUBJECTIVE AND OBJECTIVE BOX
KEN RAMIREZ  79y, Male  Allergy: No Known Allergies      All historical available data reviewed.    HPI:  79 year old male with PMHx of COPD, Interstitial lung disease, BPH, Dysphagia s/p PEG  Pt was recently admitted 10/31- for sepsis secondary to pneumonia and failure to thrive. He had a PEG tube placed on  by Dr. Meredith (GI) and was d/c'ed home 4 days ago. His family reported that pt was febrile when he was discharged and continued to be febrile. (though his last recorded temperature was 96.0) .  He was also noted to have productive cough. He is unable to provide history at this time, contact family in AM. In the ED per sepsis protocol,  2.5L LR, Cefepime, and Levaquin for were given for presumed HCAP.  WBC 22, he was tachycardic to the 120s,  so CTA chest/abd was ordered to r/o PE and was negative. (2019 03:59)    FAMILY HISTORY:  FHx: pneumonia    PAST MEDICAL & SURGICAL HISTORY:  Emphysematous COPD  Status post insertion of percutaneous endoscopic gastrostomy (PEG) tube        VITALS:  T(F): 96.5, Max: 102.3 (11-15- @ 22:50)  HR: 91  BP: 96/61  RR: 20Vital Signs Last 24 Hrs  T(C): 35.8 (2019 05:18), Max: 39.1 (15 Nov 2019 22:50)  T(F): 96.5 (2019 05:18), Max: 102.3 (15 Nov 2019 22:50)  HR: 91 (2019 05:18) (91 - 146)  BP: 96/61 (2019 05:18) (96/61 - 132/64)  BP(mean): 5 (15 Nov 2019 23:15) (5 - 5)  RR: 20 (2019 05:18) (20 - 24)  SpO2: 99% (2019 01:00) (90% - 100%)    TESTS & MEASUREMENTS:                        9.9    22.78 )-----------( 412      ( 15 Nov 2019 22:45 )             32.2     11-15    149<H>  |  106  |  44<H>  ----------------------------<  296<H>  4.8   |  24  |  0.9    Ca    9.0      15 Nov 2019 22:45    TPro  8.0  /  Alb  2.9<L>  /  TBili  0.5  /  DBili  x   /  AST  46<H>  /  ALT  34  /  AlkPhos  89  11-15    LIVER FUNCTIONS - ( 15 Nov 2019 22:45 )  Alb: 2.9 g/dL / Pro: 8.0 g/dL / ALK PHOS: 89 U/L / ALT: 34 U/L / AST: 46 U/L / GGT: x             Urinalysis Basic - ( 2019 01:11 )    Color: Yellow / Appearance: Clear / S.026 / pH: x  Gluc: x / Ketone: Negative  / Bili: Negative / Urobili: 6 mg/dL   Blood: x / Protein: 100 mg/dL / Nitrite: Negative   Leuk Esterase: Negative / RBC: 91 /HPF / WBC 12 /HPF   Sq Epi: x / Non Sq Epi: 6 /HPF / Bacteria: Negative          RADIOLOGY & ADDITIONAL TESTS:  Personal review of radiological diagnostics performed  Echo and EKG results noted when applicable.     ANTIBIOTICS:  levoFLOXacin IVPB 750 milliGRAM(s) IV Intermittent every 24 hours  meropenem  IVPB 1000 milliGRAM(s) IV Intermittent every 8 hours  vancomycin  IVPB      vancomycin  IVPB 1000 milliGRAM(s) IV Intermittent every 12 hours

## 2019-11-17 NOTE — PROGRESS NOTE ADULT - ASSESSMENT
79 year old male with PMHx of COPD, Interstitial lung disease, BPH, Dysphagia s/p PEG presenting due to fever and altered mental status.      # Sepsis on admit (resolving) secondary to Bacterial Pneumonia (suspect gram neg bact), COPD Exacerbation w/ acute hypoxic resp failure (better now and off O2), chronic Intersitial lung disease  CXR appears similar to last  ID noted  Abx: vanco + cefepime  Vancomycin Level, Trough (11.17.19 @ 07:08) Trough: 30.7: - hold vanco  f/u w/ ID  Ucx x1 and Bld Cx x2 are neg  nebs prn  can d/c steroids  cbc q24    # hypotension - s/p fluid bolus; prob 2/2 sepsis (? bleed - see below)  start midodrine 5mg peg q8    # CT Chest with possible Neoplastic process with a post obstructive pneumonia, mild LN and sm vol pneumomediastinum.  pulm eval - Dr Davies    # normo anemia; drop could be secondary to fluid bolus  rpt cbc stat  T&S  keep hgb > 7  if need to give tx, then do guaiac stool x1    # Hypernatremia - improved w/ fluid  cont fluid via PEG  BMP q24    # Failure to thrive with Severe Malnutrition - s/p PEG  Replete lytes PRN   improving    # mild steroid-induced hyperglycemia  stop steroids and f/u FS    # DVT PPX: Lovenox - would need to hold if anemia is issue, kassi if guaiac +    # GI PPX: Pantoprazole peg    # Activity: can be OOB to chair w/ asst    Dispo: tx PNA - f/u ID and Pulm; Pulm eval for CT chest abnl; tx low BP; tx Na; f/u anemia; once stable, plan will be to d/c home 79 year old male with PMHx of COPD, Interstitial lung disease, BPH, Dysphagia s/p PEG presenting due to fever and altered mental status.      # Sepsis on admit (resolving) secondary to Bacterial Pneumonia (suspect gram neg bact), COPD Exacerbation w/ acute hypoxic resp failure (better now and off O2), chronic Intersitial lung disease  CXR appears similar to last  ID noted  Abx: vanco + cefepime  Vancomycin Level, Trough (11.17.19 @ 07:08) Trough: 30.7: - hold vanco  f/u w/ ID  Ucx x1 and Bld Cx x2 are neg  nebs prn  can d/c steroids  cbc q24    # hypotension - s/p fluid bolus; prob 2/2 sepsis (? bleed - see below)  start midodrine 5mg peg q8    # CT Chest with possible Neoplastic process with a post obstructive pneumonia, mild LN and sm vol pneumomediastinum.  pulm eval - Dr Davies    # normo anemia; drop could be secondary to fluid bolus  rpt cbc stat  T&S  keep hgb > 7  if need to give tx, then do guaiac stool x1    # Hypernatremia - improved w/ fluid  cont fluid via PEG  BMP q24    # Failure to thrive with Severe Malnutrition - s/p PEG  Replete lytes PRN   improving  family wanted to see if pt could eat again, even for comfort - Sp/Sw eval    # mild steroid-induced hyperglycemia  stop steroids and f/u FS    # DVT PPX: Lovenox - would need to hold if anemia is issue, kassi if guaiac +    # GI PPX: Pantoprazole peg    # Activity: can be OOB to chair w/ asst    Dispo: tx PNA - f/u ID and Pulm; Pulm eval for CT chest abnl; tx low BP; tx Na; f/u anemia; f/u Sp/Sw; once stable, plan will be to d/c home

## 2019-11-17 NOTE — PROGRESS NOTE ADULT - SUBJECTIVE AND OBJECTIVE BOX
KEN RAMIREZ  79y  Male  ***My note supersedes ALL resident notes that I sign.  My corrections for their notes are in my note.***    I can be reached directly on [email or web address]. My office number is [phone]. My personal cell number is [phone].    Evelyn Thornton RN    INTERVAL EVENTS: Here for f/u of PNA. PT looks very good today. He is very awake and alert and remembers me from yesterday. He said his mouth had phlegm that was difficult to swallow. RN also said BP was low and needed some fluid bolus of 500cc.    T(F): 96.6 (19 @ 05:17), Max: 98.3 (19 @ 18:03)  HR: 88 (19 @ 05:17) (88 - 117)  BP: 95/56 (19 @ 05:17) (91/55 - 101/53)  RR: 18 (19 @ 05:17) (18 - 18)  SpO2: 95% (19 @ 08:11) (95% - 95%)    Gen: NAD  HEENT: + thick, light green phlegm on roof of mouth and less on pharynx; no bleeding; no thrush; mildly tender w/ cleaning w/ moist mouth swab; PERRL; EOMI; scl white; now off O2; nose clr; ears OK  Neck: no tender; no nodes; no JVD; thyroid nl  lungs: + crackles lt base (better); no wheeze  Hrt: s1 s2 reg mildly tachy no murmur  abd + PEG - site OK; ND/NT no masses  ext: no edema: rt hand - 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 (fused, half fingers and remnant tip; no c/c    LABS:                        6.6     (    85.6   17.46 )-----------( ---------      264      ( 2019 07:08 )             20.8    (    14.8     WBC Count: 17.46 K/uL (19 @ 07:08) - better; also on steroids  WBC Count: 18.18 K/uL (19 @ 09:10)  WBC Count: 22.78 K/uL (11-15-19 @ 22:45)    Hemoglobin: 6.6 g/dL ( 07:08) - could have fallen after fluid bolus, will rpt - d/w PGY1 - Dr Ames  Hemoglobin: 8.2 g/dL ( 09:10)  Hemoglobin: 9.9 g/dL (11-15 @ 22:45)    142   (   106   (   207      19 @ 21:29  ----------------------               4.7   (   24   (   31                             -----                        0.6  Ca  8.2   Mg  --    P   --     PT/INR - ( 2019 09:10 )   PT: 19.30 sec;   INR: 1.69 ratio    PTT - ( 2019 09:10 )  PTT:33.6 sec    Urinalysis Basic - ( 2019 01:11 )    Color: Yellow / Appearance: Clear / S.026 / pH: x  Gluc: x / Ketone: Negative  / Bili: Negative / Urobili: 6 mg/dL   Blood: x / Protein: 100 mg/dL / Nitrite: Negative   Leuk Esterase: Negative / RBC: 91 /HPF / WBC 12 /HPF   Sq Epi: x / Non Sq Epi: 6 /HPF / Bacteria: Negative    CAPILLARY BLOOD GLUCOSE  POCT Blood Glucose.: 169 (19 @ 07:41)  POCT Blood Glucose.: 228 (19 @ 21:21)  POCT Blood Glucose.: 210 (19 @ 16:59)  POCT Blood Glucose.: 178 (19 @ 11:10)  POCT Blood Glucose.: 153 (19 @ 08:03)  POCT Blood Glucose.: 109 (19 @ 05:47)    Culture - Urine (collected 19 @ 01:11)  Source: .Urine Clean Catch (Midstream)  Final Report (19 @ 09:03):    No growth    Culture - Blood (collected 11-15-19 @ 22:45)  Source: .Blood Blood-Peripheral  Preliminary Report (19 @ 07:01):    No growth to date.    Culture - Blood (collected 11-15-19 @ 22:45)  Source: .Blood Blood-Peripheral  Preliminary Report (19 @ 07:01):    No growth to date.    RADIOLOGY & ADDITIONAL TESTS:  < from: CT Abdomen and Pelvis w/ IV Cont (19 @ 01:38) >  IMPRESSION:     1.0 x 0.9 x 1.0 cm calculus, left renal pelvis with associated mild   hydronephrosis.     No distal hydroureter. Few bilateral nonobstructing renal calculi.    The left-sided calculus is migrated into the renal pelvis since 2018.    PEG tube balloon within the stomach.    < end of copied text >    < from: CT Angio Chest w/ IV Cont (19 @ 01:37) >  IMPRESSION:    1.  No evidence of central or segmental pulmonary emboli.    2.  Small volume pneumomediastinum.    3.  Apparent soft tissue density in the region of the left hilum with   cutoff of the left lower lobe bronchus and patchy bilateral lower lobe   airspace opacities and few mildly enlarged mediastinal lymph nodes. This   is new since 2019 and an underlying neoplastic process with a post   obstructive pneumonia cannot be ruled out. Further evaluation with   bronchoscopy may be of use.    < end of copied text >    < from: Xray Chest 1 View-PORTABLE IMMEDIATE (19 @ 00:19) >  Impression:      Stable bilateral opacities, most pronounced at the left lung base.     No pneumothorax.    < end of copied text >        MEDICATIONS:  cefepime   IVPB 2000 milliGRAM(s) IV Intermittent every 8 hours  vancomycin  IVPB 1000 milliGRAM(s) IV Intermittent every 12 hours    albuterol/ipratropium for Nebulization 3 milliLiter(s) Nebulizer every 6 hours  enoxaparin Injectable 40 milliGRAM(s) SubCutaneous daily  influenza   Vaccine 0.5 milliLiter(s) IntraMuscular once  methylPREDNISolone sodium succinate Injectable 40 milliGRAM(s) IV Push two times a day  multivitamin 1 Tablet(s) Oral daily

## 2019-11-18 NOTE — PROVIDER CONTACT NOTE (OTHER) - SITUATION
pt found to have pulled a suture like piece out from the PEG tube, RN inspected site - 2 others intact with skin, PEG tube flush - no c/o pain or leakage from site, MD verbalized understanding and is

## 2019-11-18 NOTE — SWALLOW BEDSIDE ASSESSMENT ADULT - SLP GENERAL OBSERVATIONS
Pt received laying in bed, alert w/ PEG tube in place. nonproductive cough. minimally verbal. pt's son present at the bedside to assist in translation as needed.

## 2019-11-18 NOTE — PROGRESS NOTE ADULT - ASSESSMENT
79 year old male with PMHx of COPD, Interstitial lung disease, BPH, Dysphagia s/p PEG presenting due to fever and altered mental status, admitted for sepsis, clinically stable on antibiotics.     #Sepsis on admit (resolving) secondary to LLL likely postobstructive pneumonia vs. chronic aspiration pneumonitis  #COPD Exacerbation w/ acute hypoxic resp failure (better now and off O2)  #chronic Intersitial lung disease  -11/16: CTA : No evidence of central or segmental pulmonary emboli. Small volume pneumomediastinum. Apparent soft tissue density in the region of the left hilum with cutoff of the left lower lobe bronchus and patchy bilaterallower lobe  airspace opacities and few mildly enlarged mediastinal lymph nodes. This is new since July 2019 and an underlying neoplastic process with a post obstructive pneumonia cannot be ruled out. Further evaluation with bronchoscopy may be of use.  -pulm is following, rec: Continue Antibx as per infectious disease team. Follow up cultures. High risk for bronchoscopy without intubation; will discuss with family about risk vs benefit of bronchoscopy, chest PT, Aggressive pulmonary toilet, HOB >45, Aspiration precautions  -ID is following: rec vanc and cefepime, vacn held for trough of 30.7 on 11/17, repeat level this am at 9, resume vanc 1gq12   -11/16: Ucx x1  -11/16: Bld Cx x2 are neg  -nebs prn  -MRSA pending    # hypotension - s/p fluid bolus; prob 2/2 sepsis (? bleed - see below)  -c/w  midodrine 5mg peg q8    # CT Chest with possible Neoplastic process with a post obstructive pneumonia, mild LN and sm vol pneumomediastinum.  -pulm is following, rec: Continue Antibx as per infectious disease team. Follow up cultures. High risk for bronchoscopy without intubation; will discuss with family about risk vs benefit of bronchoscopy, chest PT, Aggressive pulmonary toilet, HOB >45, Aspiration precautions    # normo anemia; drop could be secondary to fluid bolus  -T&S for tmrw 11/19   -keep hgb > 7  -if need to give tx, then do guaiac stool x1    # Hypernatremia - resolved  BMP q24    # Failure to thrive with Severe Malnutrition - s/p PEG  -Replete lytes PRN   -family wanted to see if pt could eat again, even for comfort   -pending speech and swallow eval     # mild steroid-induced hyperglycemia, resolved  -f/u FS    # DVT PPX: Lovenox - would need to hold if anemia is issue, kassi if guaiac +  # GI PPX: Pantoprazole peg  # Activity: can be OOB to chair w/ asst  Dispo: tx PNA - f/u ID and Pulm; tx low BP, f/u anemia; f/u Sp/Sw; once stable, plan will be to d/c home 79 year old male with PMHx of COPD, Interstitial lung disease, BPH, Dysphagia s/p PEG presenting due to fever and altered mental status, admitted for sepsis, clinically stable on antibiotics.     #Sepsis on admit (resolving) secondary to LLL likely postobstructive pneumonia vs. chronic aspiration pneumonitis  #COPD Exacerbation w/ acute hypoxic resp failure (better now and off O2)  #chronic Intersitial lung disease  -11/16: CTA : No evidence of central or segmental pulmonary emboli. Small volume pneumomediastinum. Apparent soft tissue density in the region of the left hilum with cutoff of the left lower lobe bronchus and patchy bilaterallower lobe  airspace opacities and few mildly enlarged mediastinal lymph nodes. This is new since July 2019 and an underlying neoplastic process with a post obstructive pneumonia cannot be ruled out. Further evaluation with bronchoscopy may be of use.  -pulm is following, rec: Continue Antibx as per infectious disease team. Follow up cultures. High risk for bronchoscopy without intubation; will discuss with family about risk vs benefit of bronchoscopy, chest PT, Aggressive pulmonary toilet, HOB >45, Aspiration precautions  -ID is following: rec vanc and cefepime, vacn held for trough of 30.7 on 11/17, repeat level this am at 9, resume vanc 500 gq12 given low body weight  -11/16: Ucx x1  -11/16: Bld Cx x2 are neg  -nebs prn  -MRSA pending    # hypotension - s/p fluid bolus; prob 2/2 sepsis (? bleed - see below)  -c/w  midodrine 5mg peg q8    # CT Chest with possible Neoplastic process with a post obstructive pneumonia, mild LN and sm vol pneumomediastinum.  -pulm is following, rec: Continue Antibx as per infectious disease team. Follow up cultures. High risk for bronchoscopy without intubation; will discuss with family about risk vs benefit of bronchoscopy, chest PT, Aggressive pulmonary toilet, HOB >45, Aspiration precautions    # normo anemia; drop could be secondary to fluid bolus  -T&S for tmrw 11/19 placed  -keep hgb > 7  -if need to give tx, then do guaiac stool x1    # Hypernatremia - resolved  BMP q24    # Failure to thrive with Severe Malnutrition - s/p PEG  -Replete lytes PRN   -family wanted to see if pt could eat again, even for comfort   -pending speech and swallow eval     # mild steroid-induced hyperglycemia, resolved  -f/u FS    # DVT PPX: Lovenox - would need to hold if anemia is issue, kassi if guaiac +  # GI PPX: Pantoprazole peg  # Activity: can be OOB to chair w/ asst  Dispo: tx PNA - f/u ID and Pulm; tx low BP, f/u anemia; f/u Sp/Sw; once stable, plan will be to d/c home

## 2019-11-18 NOTE — DIETITIAN INITIAL EVALUATION ADULT. - OTHER INFO
Pt p/w fever and primary dx: Sepsis, improving, likely 2/2 LLL likely postobstructive pneumonia vs. chronic aspiration pneumonitis. COPD exacerbation with acute hypoxic resp failure. Chronic interstitial lung disease. Pulm and ID following. Hypotension s/p fluid bolus. Normocytic anemia, T&S tomorrow. FTT and severe PCM s/p PEG placement 11/6, family requesting SLP eval for PO comfort feeds.

## 2019-11-18 NOTE — DIETITIAN INITIAL EVALUATION ADULT. - DIET TYPE
diet order per SLP. If cleared, ok to provide meal with PEG feeds but PEG to provide bulk of nutrition.

## 2019-11-18 NOTE — SWALLOW BEDSIDE ASSESSMENT ADULT - SWALLOW EVAL: DIAGNOSIS
No PO trials presented on this date d/t known history of severe pharyngeal dysphagia and high risk for aspiration at this time. recent PEG placement for 1' means of nutrition/hydration 11/6.

## 2019-11-18 NOTE — PROGRESS NOTE ADULT - SUBJECTIVE AND OBJECTIVE BOX
SUBJECTIVE:    Patient is a 79y old Male who presents with a chief complaint of Sepsis (18 Nov 2019 09:29)    Currently admitted to medicine with the primary diagnosis of Sepsis     Today is hospital day 2d. Denies chest pain, palpitations, shortness of breath.     INTERVAL EVENTS: vancomycin held 11/17     PAST MEDICAL & SURGICAL HISTORY  Emphysematous COPD  Status post insertion of percutaneous endoscopic gastrostomy (PEG) tube      ALLERGIES:  No Known Allergies    MEDICATIONS:  STANDING MEDICATIONS  albuterol/ipratropium for Nebulization 3 milliLiter(s) Nebulizer every 6 hours  cefepime   IVPB 2000 milliGRAM(s) IV Intermittent every 8 hours  enoxaparin Injectable 40 milliGRAM(s) SubCutaneous daily  influenza   Vaccine 0.5 milliLiter(s) IntraMuscular once  midodrine 5 milliGRAM(s) Oral every 8 hours  multivitamin 1 Tablet(s) Oral daily  pantoprazole   Suspension 40 milliGRAM(s) Enteral Tube daily    PRN MEDICATIONS    VITALS:   T(F): 97  HR: 83  BP: 105/60  RR: 18  SpO2: 96%    LABS:                        7.2    17.94 )-----------( 275      ( 18 Nov 2019 06:31 )             23.3     11-18    143  |  108  |  30<H>  ----------------------------<  117<H>  4.2   |  22  |  0.6<L>    Ca    8.2<L>      18 Nov 2019 06:31        Culture - Urine (collected 16 Nov 2019 01:11)  Source: .Urine Clean Catch (Midstream)  Final Report (17 Nov 2019 09:03):    No growth    Culture - Blood (collected 15 Nov 2019 22:45)  Source: .Blood Blood-Peripheral  Preliminary Report (17 Nov 2019 07:01):    No growth to date.    Culture - Blood (collected 15 Nov 2019 22:45)  Source: .Blood Blood-Peripheral  Preliminary Report (17 Nov 2019 07:01):    No growth to date.        PHYSICAL EXAM:  GEN: No acute distress, cachetic male in bed  PULM/CHEST: Clear to auscultation bilaterally, no rales, rhonchi or wheezes   CVS: normal rate, regular rhythm, S1-S2, no murmurs  ABD: Soft, non-tender, non-distended, +BS, Peg in place   EXT: No edema, R hip dressing in place   NEURO: non-focal SUBJECTIVE:    Patient is a 79y old Male who presents with a chief complaint of Sepsis (18 Nov 2019 09:29)    Currently admitted to medicine with the primary diagnosis of Sepsis     Today is hospital day 2d. Denies chest pain, palpitations, shortness of breath. reports mild lower gum pain.     INTERVAL EVENTS: vancomycin held 11/17     PAST MEDICAL & SURGICAL HISTORY  Emphysematous COPD  Status post insertion of percutaneous endoscopic gastrostomy (PEG) tube      ALLERGIES:  No Known Allergies    MEDICATIONS:  STANDING MEDICATIONS  albuterol/ipratropium for Nebulization 3 milliLiter(s) Nebulizer every 6 hours  cefepime   IVPB 2000 milliGRAM(s) IV Intermittent every 8 hours  enoxaparin Injectable 40 milliGRAM(s) SubCutaneous daily  influenza   Vaccine 0.5 milliLiter(s) IntraMuscular once  midodrine 5 milliGRAM(s) Oral every 8 hours  multivitamin 1 Tablet(s) Oral daily  pantoprazole   Suspension 40 milliGRAM(s) Enteral Tube daily    PRN MEDICATIONS    VITALS:   T(F): 97  HR: 83  BP: 105/60  RR: 18  SpO2: 96%    LABS:                        7.2    17.94 )-----------( 275      ( 18 Nov 2019 06:31 )             23.3     11-18    143  |  108  |  30<H>  ----------------------------<  117<H>  4.2   |  22  |  0.6<L>    Ca    8.2<L>      18 Nov 2019 06:31        Culture - Urine (collected 16 Nov 2019 01:11)  Source: .Urine Clean Catch (Midstream)  Final Report (17 Nov 2019 09:03):    No growth    Culture - Blood (collected 15 Nov 2019 22:45)  Source: .Blood Blood-Peripheral  Preliminary Report (17 Nov 2019 07:01):    No growth to date.    Culture - Blood (collected 15 Nov 2019 22:45)  Source: .Blood Blood-Peripheral  Preliminary Report (17 Nov 2019 07:01):    No growth to date.        PHYSICAL EXAM:  GEN: No acute distress, cachetic male in bed  PULM/CHEST: Clear to auscultation bilaterally, no rales, rhonchi or wheezes   CVS: normal rate, regular rhythm, S1-S2, no murmurs  ABD: Soft, non-tender, non-distended, +BS, Peg in place   EXT: No edema, R hip dressing in place   NEURO: non-focal SUBJECTIVE:    Patient is a 79y old Male who presents with a chief complaint of Sepsis (18 Nov 2019 09:29)    Currently admitted to medicine with the primary diagnosis of Sepsis     Today is hospital day 2d. Denies chest pain, palpitations, shortness of breath. reports mild lower gum pain.   no sob, orthopnea, pnd, cough    INTERVAL EVENTS: vancomycin held 11/17     PAST MEDICAL & SURGICAL HISTORY  Emphysematous COPD  Status post insertion of percutaneous endoscopic gastrostomy (PEG) tube      ALLERGIES:  No Known Allergies    MEDICATIONS:  STANDING MEDICATIONS  albuterol/ipratropium for Nebulization 3 milliLiter(s) Nebulizer every 6 hours  cefepime   IVPB 2000 milliGRAM(s) IV Intermittent every 8 hours  enoxaparin Injectable 40 milliGRAM(s) SubCutaneous daily  influenza   Vaccine 0.5 milliLiter(s) IntraMuscular once  midodrine 5 milliGRAM(s) Oral every 8 hours  multivitamin 1 Tablet(s) Oral daily  pantoprazole   Suspension 40 milliGRAM(s) Enteral Tube daily    PRN MEDICATIONS    VITALS:   T(F): 97  HR: 83  BP: 105/60  RR: 18  SpO2: 96%    LABS:                        7.2    17.94 )-----------( 275      ( 18 Nov 2019 06:31 )             23.3     11-18    143  |  108  |  30<H>  ----------------------------<  117<H>  4.2   |  22  |  0.6<L>    Ca    8.2<L>      18 Nov 2019 06:31        Culture - Urine (collected 16 Nov 2019 01:11)  Source: .Urine Clean Catch (Midstream)  Final Report (17 Nov 2019 09:03):    No growth    Culture - Blood (collected 15 Nov 2019 22:45)  Source: .Blood Blood-Peripheral  Preliminary Report (17 Nov 2019 07:01):    No growth to date.    Culture - Blood (collected 15 Nov 2019 22:45)  Source: .Blood Blood-Peripheral  Preliminary Report (17 Nov 2019 07:01):    No growth to date.        PHYSICAL EXAM:  GEN: No acute distress, cachetic male in bed  PULM/CHEST: Clear to auscultation bilaterally, no rales, rhonchi or wheezes   CVS: normal rate, regular rhythm, S1-S2, no murmurs  ABD: Soft, non-tender, non-distended, +BS, Peg in place   EXT: No edema, R hip dressing in place   NEURO: non-focal

## 2019-11-18 NOTE — CHART NOTE - NSCHARTNOTEFT_GEN_A_CORE
Upon Nutritional Assessment by the Registered Dietitian your patient was determined to meet criteria / has evidence of the following diagnosis/diagnoses:          [ ]  Mild Protein Calorie Malnutrition        [ ]  Moderate Protein Calorie Malnutrition        [x] Severe Protein Calorie Malnutrition in setting of social/environmental circumstances r/t dysphagia and poor intake        [ ] Unspecified Protein Calorie Malnutrition        [x] Underweight / BMI <19, 14.4         [ ] Morbid Obesity / BMI > 40    Findings as based on:  •  Comprehensive nutrition assessment and consultation  •  Nutrition focused physical assessment   •  Food and nutrition related history     Severe PCM Indicators:  1. >5% wt loss x 1month.  2. severe muscle wasting (temporal, clavicle and shoulder regions).  3. severe subcutaneous fat loss (orbital and tricep regions).    Treatment:    The following diet has been recommended:  1. Adjust current TF regimen to mimic meal patter for QOL and to meet estimated nutritional needs. Provide bolus feeds of Jevity 1.2 @ 360mL q8H.   --TF at goal rate will provide 1296kcal, 60g protein, 100% RDIs and 875mL free H2O. Flushes per LIP.   --Maintain aspiration precautions.   2. Consider d/c multivitamin with TF adjustment as formula will meet 100% RDIs.  3. Diet order per SLP. If cleared, ok to provide meal with PEG feeds but PEG to provide bulk of nutrition.    PROVIDER Section:     By signing this assessment you are acknowledging and agree with the diagnosis/diagnoses assigned by the Registered Dietitian    Comments:

## 2019-11-18 NOTE — CONSULT NOTE ADULT - ASSESSMENT
IMPRESSION:  LLL likely postobstructive pneumonia  Mild Pneumomediastinum likely secondary to ruptured sub-pleural bleb  HO interstitial lung disease  HO chronic obstructive pulmonary disease?  HO failure to thrive s/p PEG    PLAN:  Continue Antibx as per ID  Follow up cultures  May need bronchoscopy with BAL to left lower lobe  Chest PT  Aggressive pulmonary toilet  HOB >45  Aspiration precautions  OOB to chair IMPRESSION:  LLL likely postobstructive pneumonia vs. chronic aspiration pneumonitis  Mild Pneumomediastinum likely secondary to ruptured sub-pleural bleb  HO interstitial lung disease  HO chronic obstructive pulmonary disease?  HO failure to thrive s/p PEG    PLAN:  Continue Antibx as per ID  Follow up cultures  High risk for bronchoscopy without intubation; will discuss with family about risk vs benefit of bronchoscopy  Chest PT  Aggressive pulmonary toilet  HOB >45  Aspiration precautions  OOB to chair  DNR/DNI IMPRESSION:  LLL likely postobstructive pneumonia/ recrrent aspiration  HO interstitial lung disease  HO chronic obstructive pulmonary disease  HO failure to thrive s/p PEG    PLAN:  Continue Antibx as per ID  Follow up cultures  High risk for bronchoscopy without intubation; will discuss with family about risk vs benefit of bronchoscopy  Chest PT  Aggressive pulmonary toilet  HOB >45  Aspiration precautions  OOB to chair  DNR/DNI

## 2019-11-18 NOTE — DIETITIAN INITIAL EVALUATION ADULT. - ENERGY NEEDS
estimated calorie needs = ~1180-1375kcal/day (30-35 kcal/kg CBW d/t severe PCM and multiple pressure ulcers).   estimated protein needs = 49-59 g/day (1.25-1.5 g/kg CBW, reasons mentioned above).   estimated fluid needs = per pulm.

## 2019-11-18 NOTE — SWALLOW BEDSIDE ASSESSMENT ADULT - SLP PERTINENT HISTORY OF CURRENT PROBLEM
Pt presented to ED for fever and productive cough. PMHx: COPD, interstitial lung disease, BPH, dysphagia s/p PEG placement 11/6/19. Pt w/ recent admission 10/31-11/11 for sepsis 2' PNA and failure to thrive w/ d/c home. Pt known to acute care service and well as acute rehab service. Pt had MBSS 8/7/19 demonstrating severe pharyngeal dysphagia and high risk for aspiration with oral intake. CT angio chest showed LLL opacities, enlarged mediastinal lymph nodes and possible post-obstructive pneumonia.

## 2019-11-18 NOTE — DIETITIAN INITIAL EVALUATION ADULT. - FEEDING SKILL
Pt Serbian speaking, no family present at bedside. Attempted to reach pt son via phone in chart but no response, left voicemail. At most recent d/c (11/9) pt was receiving bolus feeds of Jevity 1.2 @240mL 5x/day. Unsure if pt continued at home. NKFA noted./total assistance

## 2019-11-18 NOTE — DIETITIAN INITIAL EVALUATION ADULT. - ADD RECOMMEND
RD will monitor diet order, energy intake, nutrition related labs, body composition, NFPF (TF tolerance).

## 2019-11-18 NOTE — DIETITIAN INITIAL EVALUATION ADULT. - ENTERAL
Adjust current TF regimen to mimic meal patter for QOL and to meet estimated nutritional needs. Provide bolus feeds of Jevity 1.2 @ 360mL q8H. TF at goal rate will provide 1296kcal, 60g protein, 100% RDIs and 875mL free H2O. Flushes per LIP. Maintain aspiration precautions. Consider d/c multivitamin with TF adjustment as formula will meet 100% RDIs.

## 2019-11-18 NOTE — DIETITIAN INITIAL EVALUATION ADULT. - ENERGY INTAKE
Current TF regimen provides 84% kcal and 91% protein needs. Recommend adjust TF regimen to mimic meal patter as family requesting trial of PO intake for comfort and to meet nutritional needs. Recommendations at end of document and d/w LIP.

## 2019-11-18 NOTE — DIETITIAN INITIAL EVALUATION ADULT. - MALNUTRITION
severe PCM  in setting of social/environmental circumstances r/t dysphagia and poor intake AEB >5% wt loss x 1month, severe muscle wasting (temporal, clavicle and shoulder regions) and  severe subcutaneous fat loss (orbital and tricep regions).

## 2019-11-18 NOTE — CONSULT NOTE ADULT - SUBJECTIVE AND OBJECTIVE BOX
Patient is a 79y old  Male who presents with a chief complaint of Sepsis (17 Nov 2019 09:11)      HPI:  79 year old male with PMHx of COPD, Interstitial lung disease, BPH, Dysphagia s/p PEG  Pt was recently admitted 10/31-11/11 for sepsis secondary to pneumonia and failure to thrive. He had a PEG tube placed on 11/6 by Dr. Meredith (GI) and was d/c'ed home 4 days ago. His family reported that pt was febrile when he was discharged and continued to be febrile. (though his last recorded temperature was 96.0) .  He was also noted to have productive cough. He is unable to provide history at this time, contact family in AM. In the ED per sepsis protocol,  2.5L LR, Cefepime, and Levaquin for were given for presumed HCAP.  WBC 22, he was tachycardic to the 120s,  so CTA chest/abd was ordered to r/o PE and was negative. (16 Nov 2019 03:59)      PAST MEDICAL & SURGICAL HISTORY:  Emphysematous COPD  Status post insertion of percutaneous endoscopic gastrostomy (PEG) tube      SOCIAL HX:   former smoker unknown quantity    FAMILY HISTORY:  FHx: pneumonia  .  No cardiovascular or pulmonary family history     Review of System:  See HPI    Allergies    No Known Allergies    Intolerances          PHYSICAL EXAM  Vital Signs Last 24 Hrs  T(C): 36.1 (18 Nov 2019 05:36), Max: 36.1 (17 Nov 2019 21:18)  T(F): 97 (18 Nov 2019 05:36), Max: 97 (17 Nov 2019 21:18)  HR: 83 (18 Nov 2019 05:36) (83 - 104)  BP: 105/60 (18 Nov 2019 05:36) (80/43 - 105/60)  BP(mean): --  RR: 18 (18 Nov 2019 05:36) (18 - 18)  SpO2: 96% (18 Nov 2019 08:10) (96% - 96%)    General: In NAD  HEENT: DAREK             Lymphatic system: No cervical LN     Lungs: Bilateral crackles more pronounced at the bases  Cardiovascular: Regular  Gastrointestinal: Soft.  + BS   Musculoskeletal: No Clubbing.  Full range of motion.. Moves all extremities  Skin: Warm.  Intact  Neurological: No motor or sensory deficit       LABS:                          7.2    17.94 )-----------( 275      ( 18 Nov 2019 06:31 )             23.3                                               11-18    143  |  108  |  30<H>  ----------------------------<  117<H>  4.2   |  22  |  0.6<L>    Ca    8.2<L>      18 Nov 2019 06:31                                                                                                                                      Culture - Urine (collected 16 Nov 2019 01:11)  Source: .Urine Clean Catch (Midstream)  Final Report (17 Nov 2019 09:03):    No growth    Culture - Blood (collected 15 Nov 2019 22:45)  Source: .Blood Blood-Peripheral  Preliminary Report (17 Nov 2019 07:01):    No growth to date.    Culture - Blood (collected 15 Nov 2019 22:45)  Source: .Blood Blood-Peripheral  Preliminary Report (17 Nov 2019 07:01):    No growth to date.                                                    MEDICATIONS  (STANDING):  albuterol/ipratropium for Nebulization 3 milliLiter(s) Nebulizer every 6 hours  cefepime   IVPB 2000 milliGRAM(s) IV Intermittent every 8 hours  enoxaparin Injectable 40 milliGRAM(s) SubCutaneous daily  influenza   Vaccine 0.5 milliLiter(s) IntraMuscular once  midodrine 5 milliGRAM(s) Oral every 8 hours  multivitamin 1 Tablet(s) Oral daily  pantoprazole   Suspension 40 milliGRAM(s) Enteral Tube daily    MEDICATIONS  (PRN): Patient is a 79y old  Male who presents with a chief complaint of Sepsis (17 Nov 2019 09:11)      HPI:  79 year old male with PMHx of COPD, Interstitial lung disease, BPH, Dysphagia s/p PEG  Pt was recently admitted 10/31-11/11 for sepsis secondary to pneumonia and failure to thrive. He had a PEG tube placed on 11/6 by Dr. Meredith (GI) and was d/c'ed home 4 days ago. His family reported that pt was febrile when he was discharged and continued to be febrile. (though his last recorded temperature was 96.0) .  He was also noted to have productive cough. He is unable to provide history at this time, contact family in AM. In the ED per sepsis protocol,  2.5L LR, Cefepime, and Levaquin for were given for presumed HCAP.  WBC 22, he was tachycardic to the 120s,  so CTA chest/abd was ordered to r/o PE and was negative. (16 Nov 2019 03:59) called to evaluate      PAST MEDICAL & SURGICAL HISTORY:  Emphysematous COPD  Status post insertion of percutaneous endoscopic gastrostomy (PEG) tube      SOCIAL HX:   former smoker unknown quantity    FAMILY HISTORY:  FHx: pneumonia  .  No cardiovascular or pulmonary family history     Review of System:  See HPI    Allergies    No Known Allergies    Intolerances          PHYSICAL EXAM  Vital Signs Last 24 Hrs  T(C): 36.1 (18 Nov 2019 05:36), Max: 36.1 (17 Nov 2019 21:18)  T(F): 97 (18 Nov 2019 05:36), Max: 97 (17 Nov 2019 21:18)  HR: 83 (18 Nov 2019 05:36) (83 - 104)  BP: 105/60 (18 Nov 2019 05:36) (80/43 - 105/60)  RR: 18 (18 Nov 2019 05:36) (18 - 18)  SpO2: 96% (18 Nov 2019 08:10) (96% - 96%)    General: chronically ill looking  HEENT: DAREK             Lymphatic system: No cervical LN     Lungs: Bilateral crackles more pronounced at the bases  Cardiovascular: Regular  Gastrointestinal: Soft.  + BS   Musculoskeletal: swelling -  Neurological: No motor or sensory deficit       LABS:                          7.2    17.94 )-----------( 275      ( 18 Nov 2019 06:31 )             23.3                                               11-18    143  |  108  |  30<H>  ----------------------------<  117<H>  4.2   |  22  |  0.6<L>    Ca    8.2<L>      18 Nov 2019 06:31                                                                                                                                      Culture - Urine (collected 16 Nov 2019 01:11)  Source: .Urine Clean Catch (Midstream)  Final Report (17 Nov 2019 09:03):    No growth    Culture - Blood (collected 15 Nov 2019 22:45)  Source: .Blood Blood-Peripheral  Preliminary Report (17 Nov 2019 07:01):    No growth to date.    Culture - Blood (collected 15 Nov 2019 22:45)  Source: .Blood Blood-Peripheral  Preliminary Report (17 Nov 2019 07:01):    No growth to date.                                                    MEDICATIONS  (STANDING):  albuterol/ipratropium for Nebulization 3 milliLiter(s) Nebulizer every 6 hours  cefepime   IVPB 2000 milliGRAM(s) IV Intermittent every 8 hours  enoxaparin Injectable 40 milliGRAM(s) SubCutaneous daily  influenza   Vaccine 0.5 milliLiter(s) IntraMuscular once  midodrine 5 milliGRAM(s) Oral every 8 hours  multivitamin 1 Tablet(s) Oral daily  pantoprazole   Suspension 40 milliGRAM(s) Enteral Tube daily    MEDICATIONS  (PRN):

## 2019-11-18 NOTE — DIETITIAN INITIAL EVALUATION ADULT. - FACTORS AFF FOOD INTAKE
family requesting SLP assessment to determine ability to eat by mouth for pleasure. PEG placed 11/6. no GI abnormalities per RN. loose BMs (likely TF BM) LBM 11/18.

## 2019-11-18 NOTE — SWALLOW BEDSIDE ASSESSMENT ADULT - SWALLOW EVAL: PATIENT/FAMILY GOALS STATEMENT
for patient to have primary means of nutrition/hydration using PEG tube at this time. Pt's son Manuel would like to patient to have PO, only once it is safe and patient is well enough for PO intake.

## 2019-11-18 NOTE — DIETITIAN INITIAL EVALUATION ADULT. - PHYSICAL APPEARANCE
BMI 14.4 (87#, 65in). Wt hx as follows: 105# (7/29/19), 93# (10/10/19) and 84# (11/9/19). Indicates significant wt loss, see below. Pt sleeping but able to observe severe muscle wasting (temporal, clavicle and shoulder regions) and severe subcutaneous fat loss (orbital and tricep regions). no edema. stage II pressure ulcers to R hip and sacrum./underweight

## 2019-11-19 NOTE — PROGRESS NOTE ADULT - SUBJECTIVE AND OBJECTIVE BOX
SUBJECTIVE:    Patient is a 79y old Male who presents with a chief complaint of Sepsis (19 Nov 2019 10:11)    Currently admitted to medicine with the primary diagnosis of Sepsis     Today is hospital day 3d. Denies chest pain, palpitations, shortness of breath  Overnight: Pulled at sutures near peg tube, attempted to pull NGT, balloon in place, flushes well.     PAST MEDICAL & SURGICAL HISTORY  Emphysematous COPD  Status post insertion of percutaneous endoscopic gastrostomy (PEG) tube    ALLERGIES:  No Known Allergies    MEDICATIONS:  STANDING MEDICATIONS  albuterol/ipratropium for Nebulization 3 milliLiter(s) Nebulizer every 6 hours  cefepime   IVPB 2000 milliGRAM(s) IV Intermittent every 8 hours  enoxaparin Injectable 40 milliGRAM(s) SubCutaneous daily  influenza   Vaccine 0.5 milliLiter(s) IntraMuscular once  midodrine 5 milliGRAM(s) Oral every 8 hours  multivitamin 1 Tablet(s) Oral daily  pantoprazole   Suspension 40 milliGRAM(s) Enteral Tube daily  vancomycin  IVPB 500 milliGRAM(s) IV Intermittent every 12 hours    PRN MEDICATIONS    VITALS:   T(F): 98.6  HR: 105  BP: 116/57  RR: 18  SpO2: 96%    LABS:                        8.8    18.24 )-----------( 300      ( 19 Nov 2019 05:42 )             27.6     11-19    140  |  101  |  25<H>  ----------------------------<  84  4.3   |  25  |  0.6<L>    Ca    8.3<L>      19 Nov 2019 05:42  Mg     1.9     11-19        PHYSICAL EXAM:  GEN: No acute distress, cachetic male in bed  PULM/CHEST: Clear to auscultation bilaterally, no rales, rhonchi or wheezes   CVS: normal rate, regular rhythm, S1-S2, no murmurs  ABD: Soft, non-tender, non-distended, +BS, Peg in place   EXT: No edema, R hip dressing in place   NEURO: non-focal SUBJECTIVE:    Patient is a 79y old Male who presents with a chief complaint of Sepsis (19 Nov 2019 10:11)    Currently admitted to medicine with the primary diagnosis of Sepsis     Today is hospital day 3d. Denies chest pain, palpitations, shortness of breath  Overnight: Pulled at sutures near peg tube, attempted to pull peg tube balloon in place, flushes well.     PAST MEDICAL & SURGICAL HISTORY  Emphysematous COPD  Status post insertion of percutaneous endoscopic gastrostomy (PEG) tube    ALLERGIES:  No Known Allergies    MEDICATIONS:  STANDING MEDICATIONS  albuterol/ipratropium for Nebulization 3 milliLiter(s) Nebulizer every 6 hours  cefepime   IVPB 2000 milliGRAM(s) IV Intermittent every 8 hours  enoxaparin Injectable 40 milliGRAM(s) SubCutaneous daily  influenza   Vaccine 0.5 milliLiter(s) IntraMuscular once  midodrine 5 milliGRAM(s) Oral every 8 hours  multivitamin 1 Tablet(s) Oral daily  pantoprazole   Suspension 40 milliGRAM(s) Enteral Tube daily  vancomycin  IVPB 500 milliGRAM(s) IV Intermittent every 12 hours    PRN MEDICATIONS    VITALS:   T(F): 98.6  HR: 105  BP: 116/57  RR: 18  SpO2: 96%    LABS:                        8.8    18.24 )-----------( 300      ( 19 Nov 2019 05:42 )             27.6     11-19    140  |  101  |  25<H>  ----------------------------<  84  4.3   |  25  |  0.6<L>    Ca    8.3<L>      19 Nov 2019 05:42  Mg     1.9     11-19        PHYSICAL EXAM:  GEN: No acute distress, cachetic male in bed  PULM/CHEST: Clear to auscultation bilaterally, no rales, rhonchi or wheezes   CVS: normal rate, regular rhythm, S1-S2, no murmurs  ABD: Soft, non-tender, non-distended, +BS, Peg in place   EXT: No edema, R hip dressing in place   NEURO: non-focal

## 2019-11-19 NOTE — PROGRESS NOTE ADULT - ASSESSMENT
79 year old male with PMHx of COPD, Interstitial lung disease, BPH, Dysphagia s/p PEG presenting due to fever and altered mental status, admitted for sepsis, clinically stable on antibiotics.     #Sepsis on admit (resolving) secondary to LLL likely postobstructive pneumonia vs. chronic aspiration pneumonitis  #COPD Exacerbation w/ acute hypoxic resp failure (better now and off O2)  #chronic Intersitial lung disease  -11/16: CTA : No evidence of central or segmental pulmonary emboli. Small volume pneumomediastinum. Apparent soft tissue density in the region of the left hilum with cutoff of the left lower lobe bronchus and patchy bilaterallower lobe  airspace opacities and few mildly enlarged mediastinal lymph nodes. This is new since July 2019 and an underlying neoplastic process with a post obstructive pneumonia cannot be ruled out. Further evaluation with bronchoscopy may be of use.  -pulm is following, rec: Continue Antibx as per infectious disease team. Follow up cultures. High risk for bronchoscopy without intubation; FAMILY DECLINES BRONCHOSCOPY  -continue w chest PT, Aggressive pulmonary toilet, incentive spirometry, HOB >45, Aspiration precautions  -ID is following: rec vanc and cefepime, vacn held for trough of 30.7 on 11/17, repeat level this am at 9, resume vanc 500 gq12 given low body weight, vanc trough for tmrw am before 4th dose   -11/16: Ucx x1  -11/16: Bld Cx x2 are neg  -f/u sputum culture (pending collection)  -nebs prn  -MRSA pending    # hypotension - s/p fluid bolus; prob 2/2 sepsis (? bleed - see below)  -c/w  midodrine 5mg peg q8    # CT Chest with possible Neoplastic process with a post obstructive pneumonia, mild LN and sm vol pneumomediastinum.  -pulm is following, rec: Continue Antibx as per infectious disease team. Follow up cultures. High risk for bronchoscopy without intubation; will discuss with family about risk vs benefit of bronchoscopy, chest PT, Aggressive pulmonary toilet, HOB >45, Aspiration precautions    # normo anemia; drop could be secondary to fluid bolus  -T&S- order on 11/22  -keep hgb > 7  -if need to give tx, then do guaiac stool x1    # Hypernatremia - resolved  BMP q24    # Failure to thrive with Severe Malnutrition - s/p PEG  -Replete lytes PRN   -family wanted to see if pt could eat again, even for comfort   -primary means of feeding by peg, OK per S+S for comfort feeds    # mild steroid-induced hyperglycemia, resolved  -f/u FS    # DVT PPX: Lovenox - would need to hold if anemia is issue, kassi if guaiac +  # GI PPX: Pantoprazole peg  # Activity: can be OOB to chair w/ asst  Dispo: once stable, plan will be to d/c home 79 year old male with PMHx of COPD, Interstitial lung disease, BPH, Dysphagia s/p PEG presenting due to fever and altered mental status, admitted for sepsis, clinically stable on antibiotics.     #Sepsis on admit (resolving) secondary to LLL likely postobstructive pneumonia vs. chronic aspiration pneumonitis  #COPD Exacerbation w/ acute hypoxic resp failure (better now and off O2)  #chronic Intersitial lung disease  -11/16: CTA : No evidence of central or segmental pulmonary emboli. Small volume pneumomediastinum. Apparent soft tissue density in the region of the left hilum with cutoff of the left lower lobe bronchus and patchy bilaterallower lobe  airspace opacities and few mildly enlarged mediastinal lymph nodes. This is new since July 2019 and an underlying neoplastic process with a post obstructive pneumonia cannot be ruled out. Further evaluation with bronchoscopy may be of use.  -pulm is following, rec: Continue Antibx as per infectious disease team. Follow up cultures. High risk for bronchoscopy without intubation; FAMILY DECLINES BRONCHOSCOPY  -continue w chest PT, Aggressive pulmonary toilet, incentive spirometry, HOB >45, Aspiration precautions  -ID is following: rec vanc and cefepime, vacn held for trough of 30.7 on 11/17, repeat level this am at 9, resume vanc 500 gq12 given low body weight, vanc trough for tmrw am before 4th dose   -11/16: Ucx x1  -11/16: Bld Cx x2 are neg  -f/u sputum culture (pending collection)  -nebs prn  -11/19: MRSA neg    # hypotension - s/p fluid bolus; prob 2/2 sepsis (? bleed - see below)  -c/w  midodrine 5mg peg q8    # CT Chest with possible Neoplastic process with a post obstructive pneumonia, mild LN and sm vol pneumomediastinum.  -pulm is following, rec: Continue Antibx as per infectious disease team. Follow up cultures. High risk for bronchoscopy without intubation; will discuss with family about risk vs benefit of bronchoscopy, chest PT, Aggressive pulmonary toilet, HOB >45, Aspiration precautions    # normo anemia; drop could be secondary to fluid bolus  -T&S- order on 11/22  -keep hgb > 7  -if need to give tx, then do guaiac stool x1    # Hypernatremia - resolved  BMP q24    # Failure to thrive with Severe Malnutrition - s/p PEG  -Replete lytes PRN   -family wanted to see if pt could eat again, even for comfort   -primary means of feeding by peg, OK per S+S for comfort feeds    # mild steroid-induced hyperglycemia, resolved  -f/u FS    # DVT PPX: Lovenox - would need to hold if anemia is issue, kassi if guaiac +  # GI PPX: Pantoprazole peg  # Activity: can be OOB to chair w/ asst  Dispo: once stable, plan will be to d/c home

## 2019-11-19 NOTE — PROGRESS NOTE ADULT - SUBJECTIVE AND OBJECTIVE BOX
KEN RAMIREZ  79y, Male    All available historical data reviewed    OVERNIGHT EVENTS:  no fevers, NAD at rest    ROS:  General: Denies rigors, night sweats  HEENT: Denies headache, rhinorrhea, sore throat, eye pain  CV: Denies CP, palpitations  PULM: Denies wheezing, hemoptysis, has SOB  GI: Denies hematemesis, hematochezia, melena  : Denies discharge, hematuria  MSK: Denies arthralgias, myalgias  SKIN: Denies rash, lesions  NEURO: Denies paresthesias, weakness  PSYCH: Denies depression, anxiety    VITALS:  T(F): 98.6, Max: 98.6 (11-19-19 @ 04:27)  HR: 105  BP: 116/57  RR: 18Vital Signs Last 24 Hrs  T(C): 37 (19 Nov 2019 04:27), Max: 37 (19 Nov 2019 04:27)  T(F): 98.6 (19 Nov 2019 04:27), Max: 98.6 (19 Nov 2019 04:27)  HR: 105 (19 Nov 2019 04:27) (85 - 107)  BP: 116/57 (19 Nov 2019 04:27) (91/55 - 116/57)  BP(mean): --  RR: 18 (19 Nov 2019 04:27) (18 - 18)  SpO2: 96% (18 Nov 2019 23:51) (96% - 96%)    TESTS & MEASUREMENTS:                        8.8    18.24 )-----------( 300      ( 19 Nov 2019 05:42 )             27.6     11-19    140  |  101  |  25<H>  ----------------------------<  84  4.3   |  25  |  0.6<L>    Ca    8.3<L>      19 Nov 2019 05:42  Mg     1.9     11-19          Culture - Urine (collected 11-16-19 @ 01:11)  Source: .Urine Clean Catch (Midstream)  Final Report (11-17-19 @ 09:03):    No growth    Culture - Blood (collected 11-15-19 @ 22:45)  Source: .Blood Blood-Peripheral  Preliminary Report (11-17-19 @ 07:01):    No growth to date.    Culture - Blood (collected 11-15-19 @ 22:45)  Source: .Blood Blood-Peripheral  Preliminary Report (11-17-19 @ 07:01):    No growth to date.            RADIOLOGY & ADDITIONAL TESTS:  Personal review of radiological diagnostics performed  Echo and EKG results noted when applicable.     ANTIBIOTICS:  cefepime   IVPB 2000 milliGRAM(s) IV Intermittent every 8 hours  vancomycin  IVPB 500 milliGRAM(s) IV Intermittent every 12 hours

## 2019-11-19 NOTE — PROGRESS NOTE ADULT - ASSESSMENT
Assessment and Recommendation:   · Assessment		  79y old Male with a past medical history of COPD (quit smoking at 58 yrs, not on home O2), ILD, BPH and dysphagia (patient has swallowing defect as evaluated on last admission, requiring PEG insertion for adequate nutrition). Patient is presenting for fever and SOB. CT angio chest showed LLL opacities, enlarged mediastinal lymph nodes and possible post-obstructive pneumnia.     IMPRESSION:  Bibasilar GNR/ORSA PNA  -atypical Mycobacteria in DDX  -chronic ILD  -possible aspiration  -legionella NG  -BCx NG  -WBC 22.7>18.2      RECOMMENDATIONS:  Vancomycin 1 gm iv q12h  vanco trough  nares for ORSA   Cefepime 2 gm iv q12h

## 2019-11-19 NOTE — PROGRESS NOTE ADULT - SUBJECTIVE AND OBJECTIVE BOX
OVERNIGHT EVENTS: events noted, hand restraints, RA    Vital Signs Last 24 Hrs  T(C): 37 (19 Nov 2019 04:27), Max: 37 (19 Nov 2019 04:27)  T(F): 98.6 (19 Nov 2019 04:27), Max: 98.6 (19 Nov 2019 04:27)  HR: 105 (19 Nov 2019 04:27) (85 - 107)  BP: 116/57 (19 Nov 2019 04:27) (91/55 - 116/57)  RR: 18 (19 Nov 2019 04:27) (18 - 18)  SpO2: 96% (18 Nov 2019 23:51) (96% - 96%)    PHYSICAL EXAMINATION:    GENERAL: Cachectic, chronically ill looking    HEENT: Head is normocephalic and atraumatic. Extraocular muscles are intact. Mucous membranes are moist.    NECK: Supple.    LUNGS: dec bs both bases    HEART: Regular rate and rhythm without murmur.    ABDOMEN: PEG+    EXTREMITIES: Without any cyanosis, clubbing, rash, lesions or edema.    NEUROLOGIC: Grossly intact.    SKIN: No ulceration or induration present.      LABS:                        8.8    18.24 )-----------( 300      ( 19 Nov 2019 05:42 )             27.6     11-19    140  |  101  |  25<H>  ----------------------------<  84  4.3   |  25  |  0.6<L>    Ca    8.3<L>      19 Nov 2019 05:42  Mg     1.9     11-19 11-18-19 @ 07:01  -  11-19-19 @ 07:00  --------------------------------------------------------  IN: 1130 mL / OUT: 0 mL / NET: 1130 mL        MICROBIOLOGY:  Culture Results:   No growth (11-16 @ 01:11)  Culture Results:   No growth to date. (11-15 @ 22:45)  Culture Results:   No growth to date. (11-15 @ 22:45)      MEDICATIONS  (STANDING):  albuterol/ipratropium for Nebulization 3 milliLiter(s) Nebulizer every 6 hours  cefepime   IVPB 2000 milliGRAM(s) IV Intermittent every 8 hours  enoxaparin Injectable 40 milliGRAM(s) SubCutaneous daily  influenza   Vaccine 0.5 milliLiter(s) IntraMuscular once  midodrine 5 milliGRAM(s) Oral every 8 hours  multivitamin 1 Tablet(s) Oral daily  pantoprazole   Suspension 40 milliGRAM(s) Enteral Tube daily  vancomycin  IVPB 500 milliGRAM(s) IV Intermittent every 12 hours    MEDICATIONS  (PRN):      RADIOLOGY & ADDITIONAL STUDIES:

## 2019-11-19 NOTE — CONSULT NOTE ADULT - ASSESSMENT
IMPRESSION: Rehab of  marked debility, sepsis, pneumonia, poor po intake    PRECAUTIONS: [ x ] Cardiac  [x  ] Respiratory  [  ] Seizures [  ] Contact Isolation  [  ] Droplet Isolation  [  ] Other    Weight Bearing Status:     RECOMMENDATION:    Out of Bed to Chair     DVT/Decubiti Prophylaxis    REHAB PLAN:     [  x ] Bedside P/T 3-5 times a week   [   ]   Bedside O/T  2-3 times a week             [   ] No Rehab Therapy Indicated                   [   ]  Speech Therapy   Conditioning/ROM                                    ADL  Bed Mobility                                               Conditioning/ROM  Transfers                                                     Bed Mobility  Sitting /Standing Balance                         Transfers                                        Gait Training                                               Sitting/Standing Balance  Stair Training [   ]Applicable                    Home equipment Eval                                                                        Splinting  [   ] Only      GOALS:   ADL   [   ]   Independent                    Transfers  [   ] Independent                          Ambulation  [   ] Independent     [   x ] With device                            [   ]  CG                                                         [   ]  CG                                                                  [   ] CG                            [ x   ] Min A                                                   [ x  ] Min A                                                              [ x  ] Min  A          DISCHARGE PLAN:   [   ]  Good candidate for Intensive Rehabilitation/Hospital based-4A [facility]                                             Will tolerate 3hrs Intensive Rehab Daily                                       [ x   ]  Short Term Rehab in Skilled Nursing Facility                                                             VS                                     [ x   ]  Home with Outpatient or VN services                                         [    ]  Possible Candidate for Intensive Hospital based Rehab

## 2019-11-19 NOTE — CONSULT NOTE ADULT - SUBJECTIVE AND OBJECTIVE BOX
HPI:  79 year old male with PMHx of COPD, Interstitial lung disease, BPH, Dysphagia s/p PEG  Pt was recently admitted 10/31-11/11 for sepsis secondary to pneumonia and failure to thrive. He had a PEG tube placed on 11/6 by Dr. Meredith (GI) and was d/c'ed home 4 days ago. His family reported that pt was febrile when he was discharged and continued to be febrile. (though his last recorded temperature was 96.0) .  He was also noted to have productive cough. He is unable to provide history at this time, contact family in AM. In the ED per sepsis protocol,  2.5L LR, Cefepime, and Levaquin for were given for presumed HCAP.  WBC 22, he was tachycardic to the 120s,  so CTA chest/abd was ordered to r/o PE and was negative. (16 Nov 2019 03:59)      PAST MEDICAL & SURGICAL HISTORY:  Emphysematous COPD  Status post insertion of percutaneous endoscopic gastrostomy (PEG) tube      Hospital Course:  he is deconditioned and weak. Poor po intake . Being treated for post obstructive pneumonia. He is frail.  TODAY'S SUBJECTIVE & REVIEW OF SYMPTOMS:     Constitutional WNL   Cardio WNL   Resp WNL   GI WNL  Heme WNL  Endo WNL  Skin WNL  MSK WNL  Neuro WNL  Cognitive WNL  Psych WNL      MEDICATIONS  (STANDING):  albuterol/ipratropium for Nebulization 3 milliLiter(s) Nebulizer every 6 hours  cefepime   IVPB 2000 milliGRAM(s) IV Intermittent every 8 hours  enoxaparin Injectable 40 milliGRAM(s) SubCutaneous daily  influenza   Vaccine 0.5 milliLiter(s) IntraMuscular once  midodrine 5 milliGRAM(s) Oral every 8 hours  multivitamin 1 Tablet(s) Oral daily  pantoprazole   Suspension 40 milliGRAM(s) Enteral Tube daily  vancomycin  IVPB 500 milliGRAM(s) IV Intermittent every 12 hours    MEDICATIONS  (PRN):      FAMILY HISTORY:  FHx: pneumonia      Allergies    No Known Allergies    Intolerances        SOCIAL HISTORY:    [  ] Etoh  [  ] Smoking  [  ] Substance abuse     Home Environment:  [  ] Home Alone  [  ] Lives with Family  [  ] Home Health Aid    Dwelling:  [  ] Apartment  [  ] Private House  [  ] Adult Home  [  ] Skilled Nursing Facility      [  ] Short Term  [  ] Long Term  [  ] Stairs       Elevator [  ]    FUNCTIONAL STATUS PTA: (Check all that apply)  Ambulation: [   ]Independent    [  ] Dependent     [  ] Non-Ambulatory  Assistive Device: [  ] SA Cane  [  ]  Q Cane  [  ] Walker  [  ]  Wheelchair  ADL : [  ] Independent  [  ]  Dependent       Vital Signs Last 24 Hrs  T(C): 36.1 (19 Nov 2019 13:35), Max: 37 (19 Nov 2019 04:27)  T(F): 97 (19 Nov 2019 13:35), Max: 98.6 (19 Nov 2019 04:27)  HR: 105 (19 Nov 2019 15:35) (94 - 112)  BP: 100/53 (19 Nov 2019 13:35) (100/53 - 116/57)  BP(mean): --  RR: 18 (19 Nov 2019 13:35) (18 - 18)  SpO2: 95% (19 Nov 2019 15:35) (95% - 96%)      PHYSICAL EXAM: Alert & Oriented X3  GENERAL: NAD, well-groomed, well-developed  HEAD:  Atraumatic, Normocephalic  EYES: EOMI, PERRLA, conjunctiva and sclera clear  NECK: Supple, No JVD, Normal thyroid  CHEST/LUNG: Clear to percussion bilaterally; No rales, rhonchi, wheezing, or rubs  HEART: Regular rate and rhythm; No murmurs, rubs, or gallops  ABDOMEN: Soft, Nontender, Nondistended; Bowel sounds present  EXTREMITIES:  2+ Peripheral Pulses, No clubbing, cyanosis, or edema    NERVOUS SYSTEM:  Cranial Nerves 2-12 intact [  ] Abnormal  [  ]  ROM: WFL all extremities [  ]  Abnormal [  ]  Motor Strength: WFL all extremities  [  ]  Abnormal [x  ]  3/5 Le's  Sensation: intact to light touch [  ] Abnormal [  ]  Reflexes: Symmetric [  ]  Abnormal [  ]    FUNCTIONAL STATUS:  Bed Mobility: Independent [  ]  Supervision [  ]  Needs Assistance [  ]  N/A [  ]  Transfers: Independent [  ]  Supervision [  ]  Needs Assistance [  ]  N/A [  ]   Ambulation: Independent [  ]  Supervision [  ]  Needs Assistance [  ]  N/A [  ]  ADL: Independent [  ] Requires Assistance [  ] N/A [  ]      LABS:                        8.8    18.24 )-----------( 300      ( 19 Nov 2019 05:42 )             27.6     11-19    140  |  101  |  25<H>  ----------------------------<  84  4.3   |  25  |  0.6<L>    Ca    8.3<L>      19 Nov 2019 05:42  Mg     1.9     11-19            RADIOLOGY & ADDITIONAL STUDIES:    Assesment:

## 2019-11-19 NOTE — PROGRESS NOTE ADULT - ASSESSMENT
IMPRESSION:  Bibasilar opacities/ recurrent aspiration  HO interstitial lung disease  HO chronic obstructive pulmonary disease  HO failure to thrive s/p PEG    PLAN:    ABX ( DC VANCO)  Follow up cultures  High risk for bronchoscopy without intubation (son aware)  Chest PT  Aggressive pulmonary toilet  HOB >45  Aspiration precautions  OOB to chair  DNR/DNI  dc planning  poor prognosis

## 2019-11-20 NOTE — PHYSICAL THERAPY INITIAL EVALUATION ADULT - PRECAUTIONS/LIMITATIONS, REHAB EVAL
fall precautions/aspiration precautions/cardiac precautions/oxygen therapy device and L/min/2 lpm via NC

## 2019-11-20 NOTE — PHYSICAL THERAPY INITIAL EVALUATION ADULT - IMPAIRMENTS FOUND, PT EVAL
gross motor/poor safety awareness/gait, locomotion, and balance/muscle strength/ROM/aerobic capacity/endurance

## 2019-11-20 NOTE — PHYSICAL THERAPY INITIAL EVALUATION ADULT - GENERAL OBSERVATIONS, REHAB EVAL
PT eval completed. Patient encountered semi fowlers in bed, in NAD, +IV, +PEG tube, +O2 via NC 2 lpm, family at b/s, ZACHERY kimball present. SpO: before activity 94% after activity: 87%

## 2019-11-20 NOTE — PHYSICAL THERAPY INITIAL EVALUATION ADULT - IMPAIRMENTS CONTRIBUTING IMPAIRED BED MOBILITY, REHAB EVAL
Can only tolerate a minute of sitting on the EOB. Patient desats 87% after supine to sitting position with visible SOB even cont o@ 2 lpm. Had to put pt back to supine position./decreased strength/impaired coordination/impaired balance

## 2019-11-20 NOTE — PROGRESS NOTE ADULT - SUBJECTIVE AND OBJECTIVE BOX
SUBJECTIVE:    Patient is a 79y old Male who presents with a chief complaint of Sepsis (20 Nov 2019 08:17)    Currently admitted to medicine with the primary diagnosis of Sepsis     Today is hospital day 4d. O/N: desatted to 80s O/N on RA, now on 2L NC      PAST MEDICAL & SURGICAL HISTORY  Emphysematous COPD  Status post insertion of percutaneous endoscopic gastrostomy (PEG) tube      ALLERGIES:  No Known Allergies    MEDICATIONS:  STANDING MEDICATIONS  albuterol/ipratropium for Nebulization 3 milliLiter(s) Nebulizer every 6 hours  albuterol/ipratropium for Nebulization. 3 milliLiter(s) Nebulizer once  cefepime   IVPB 2000 milliGRAM(s) IV Intermittent every 8 hours  enoxaparin Injectable 40 milliGRAM(s) SubCutaneous daily  influenza   Vaccine 0.5 milliLiter(s) IntraMuscular once  midodrine 5 milliGRAM(s) Oral every 8 hours  multivitamin 1 Tablet(s) Oral daily  pantoprazole   Suspension 40 milliGRAM(s) Enteral Tube daily    PRN MEDICATIONS    VITALS:   T(F): 97.9  HR: 108  BP: 103/50  RR: 18  SpO2: 91%    LABS:                        7.8    14.57 )-----------( 250      ( 20 Nov 2019 07:01 )             23.6     11-20    139  |  101  |  20  ----------------------------<  151<H>  4.0   |  25  |  0.6<L>    Ca    7.7<L>      20 Nov 2019 07:01  Mg     1.9     11-20          PHYSICAL EXAM:  GEN: No acute distress, cachetic   PULM/CHEST: Clear to auscultation bilaterally, no rales, rhonchi or wheezes   CVS: normal rate, regular rhythm, S1-S2, no murmurs  ABD: Soft, non-tender, non-distended, +BS, peg in place w abd binder over   EXT: No edema  NEURO: AAOx1

## 2019-11-20 NOTE — PHYSICAL THERAPY INITIAL EVALUATION ADULT - PLANNED THERAPY INTERVENTIONS, PT EVAL
postural re-education/wheelchair management/propulsion training/balance training/bed mobility training/ROM/strengthening/transfer training

## 2019-11-20 NOTE — PHYSICAL THERAPY INITIAL EVALUATION ADULT - PHYSICAL ASSIST/NONPHYSICAL ASSIST: SUPINE/SIT, REHAB EVAL
Progress Notes by Theodore Mayers MD, PhD at 01/25/18 11:59 PM     Author:  Theodore Mayers MD, PhD Service:  (none) Author Type:  Physician     Filed:  01/26/18 12:10 AM Encounter Date:  1/18/2018 Status:  Signed     :  Theodore Mayers MD, PhD (Physician)            16 year old here for preventive physical[DL1.1T] and to establish care    MH:  1. Scoliosis, wore brace age [age]  2. ADHD  3. Asperger syndrome, follows with Dr. Vinh Liu of the[DL1.1M] [facility][DL1.1T]  4. Obsessive compulsive behavior  5.[DL1.1M] Right[DL1.1T] sided pneumonia[DL1.1M] s/p[DL1.1T] thorascopic decortication, 1999    NKDA    FH:  Father, Spike,[DL1.1M] impaired fasting glucose[DL1.1T]  Mother,[DL1.1M] healthy[DL1.1T]  1 brother, 1 sister;[DL1.1M] healthy  Patient[DL1.1T] is 2nd    SH:  Single  Attends[DL1.1M] [employer][DL1.1T]  Works in sales at [employer]  Nonsmoker  No alcohol[DL1.1M]  Does patient exercise?[DL1.1T] Yes - Type: Cardio;  Frequency: 3 times a week[DL1.1M]  Was counseling given:[DL1.1T] Yes[DL1.1M]     Current Outpatient Prescriptions     Medication  Sig   • Dexmethylphenidate HCl (FOCALIN OR) Take[DL1.2T] 25 mg QAM[DL1.1M].   • FISH OIL 1000 MG OR CAPS one daily[DL1.2T]     ROS:   HEENT: negative   R: no shortness of breath, no cough  CV: no chest discomfort, no palpitations   GI: no abdominal pain   : no dysuria   Const: weight is stable, no fevers or chills   Depression Screening:  Over the past 2 weeks, has patient felt down, depressed or hopeless?[DL1.1T] No[DL1.1M]  Over the past 2 weeks, has patient felt little interest or pleasure in doing things?[DL1.1T] No[DL1.1M]  On the basis of the above screen, the following is initiated:[DL1.1T]  Normal screen, continue to monitor for symptoms over time[DL1.1M]   All other systems negative     EXAM:  /68  Pulse 88  Temp 97.5 °F (36.4 °C) (Tympanic)   Wt 183 lb (83 kg)  SpO2 99%  BMI 25.52 kg/m2   Gen: in no physical  distress  Head: NC/AT, no temporal wasting  Eyes: sclera anicteric, noninjected, PERRL, EOMI  ENT: nares patent, right canal normal, right TM clear, left canal normal, left TM clear, posterior oropharynx clear, moist mucus membranes   Neck: supple, carotid pulses 2+ bilaterally, no bruits   Resp: effort unlabored, clear to auscultation bilaterally  CV: regular rate and rhythm, normal S1 and S2,[DL1.1T] no[DL1.1M] murmur  Abd: nondistended[DL1.1T],[DL1.1M] soft, nontender, normal active bowel sounds  Ext: no pedal edema bilaterally  Psych: A & O x 3, euthymic mood and affect,[DL1.1T] fair[DL1.1M] insight and judgement  Neuro: normal gait, CN 2-12 intact, DTR's 2+ throughout[DL1.1T]     A/P[DL1.1M]    #  Normal preventive exam[DL1.1T]     #  ADHD: sees[DL1.1M] outside physician[DL1.1T]     #  Asperger disorder: sees[DL1.1M] outside physician     #  Preventive: Influenza Vaccine counseling including benefits, risks and adverse reactions were provided by myself during the visit. Vaccine was administered uneventfully and patient was discharged home.     Electronically signed by: Theodore Mayers MD PhD[DL1.1T]           Revision History        User Key Date/Time User Provider Type Action    > DL1.2 01/26/18 12:10 AM Theodore Mayers MD, PhD Physician Sign     DL1.1 01/25/18 11:59 PM Theodore Mayers MD, PhD Physician     M - Manual, T - Template             2 person assist

## 2019-11-20 NOTE — PROGRESS NOTE ADULT - ASSESSMENT
· Assessment		  79y old Male with a past medical history of COPD (quit smoking at 58 yrs, not on home O2), ILD, BPH and dysphagia (patient has swallowing defect as evaluated on last admission, requiring PEG insertion for adequate nutrition). Patient is presenting for fever and SOB. CT angio chest showed LLL opacities, enlarged mediastinal lymph nodes and possible post-obstructive pneumnia.   < from: CT Angio Chest w/ IV Cont (11.20.19 @ 12:54) >  No central pulmonaryembolus.  Bilateral lower lobe predominant consolidation left greater than right   compatible with pneumonia with interval worsening demonstrated by new   groundglass opacities in the right lower lobe. Recommend follow-up   noncontrast CT chest in 8 weeks to demonstrate resolution.  Reaeration of the left lower lobe bronchus.  Near complete resolution of the pneumomediastinum.        IMPRESSION:  Bibasilar GNR/ORSA PNA  -CT chest 11/20 as above  -atypical Mycobacteria in DDX  -chronic ILD  -possible aspiration  -legionella NG  -BCx NG  -nares ORSA NG  -WBC 22.7>18.2>14.5      RECOMMENDATIONS:  D/c Vancomycin  Cefepime 2 gm iv q12h

## 2019-11-20 NOTE — PROGRESS NOTE ADULT - SUBJECTIVE AND OBJECTIVE BOX
OVERNIGHT EVENTS: events noted, on oxygen 2 l CXR reviewed, swab MRSA +    Vital Signs Last 24 Hrs  T(C): 36.6 (20 Nov 2019 04:43), Max: 37.1 (19 Nov 2019 20:18)  T(F): 97.9 (20 Nov 2019 04:43), Max: 98.8 (19 Nov 2019 20:18)  HR: 108 (20 Nov 2019 04:43) (105 - 122)  BP: 103/50 (20 Nov 2019 04:43) (100/53 - 115/58)  RR: 18 (20 Nov 2019 04:43) (18 - 18)  SpO2: 95% (19 Nov 2019 21:32) (82% - 98%)    PHYSICAL EXAMINATION:    GENERAL: Chronically ill looking/ cachectic    HEENT: Head is normocephalic and atraumatic. Extraocular muscles are intact. Mucous membranes are moist.    NECK: Supple.    LUNGS: dec bs both bases    HEART: Regular rate and rhythm without murmur.    ABDOMEN: Soft, nontender, and nondistended.        NEUROLOGIC: Grossly intact.    SKIN: No ulceration or induration present.      LABS:                        7.8    14.57 )-----------( 250      ( 20 Nov 2019 07:01 )             23.6     11-19    140  |  101  |  25<H>  ----------------------------<  84  4.3   |  25  |  0.6<L>    Ca    8.3<L>      19 Nov 2019 05:42  Mg     1.9     11-19 11-19-19 @ 07:01  -  11-20-19 @ 07:00  --------------------------------------------------------  IN: 1380 mL / OUT: 3 mL / NET: 1377 mL        MICROBIOLOGY:      MEDICATIONS  (STANDING):  albuterol/ipratropium for Nebulization 3 milliLiter(s) Nebulizer every 6 hours  albuterol/ipratropium for Nebulization. 3 milliLiter(s) Nebulizer once  cefepime   IVPB 2000 milliGRAM(s) IV Intermittent every 8 hours  enoxaparin Injectable 40 milliGRAM(s) SubCutaneous daily  influenza   Vaccine 0.5 milliLiter(s) IntraMuscular once  midodrine 5 milliGRAM(s) Oral every 8 hours  multivitamin 1 Tablet(s) Oral daily  pantoprazole   Suspension 40 milliGRAM(s) Enteral Tube daily  vancomycin  IVPB 500 milliGRAM(s) IV Intermittent every 12 hours    MEDICATIONS  (PRN):      RADIOLOGY & ADDITIONAL STUDIES:

## 2019-11-20 NOTE — PROGRESS NOTE ADULT - SUBJECTIVE AND OBJECTIVE BOX
EKN RAMIREZ  79y, Male    All available historical data reviewed    OVERNIGHT EVENTS:    none  ROS:  poorly obtainable    VITALS:  T(F): 97.9, Max: 98.8 (11-19-19 @ 20:18)  HR: 98  BP: 105/58  RR: 18Vital Signs Last 24 Hrs  T(C): 36.6 (20 Nov 2019 04:43), Max: 37.1 (19 Nov 2019 20:18)  T(F): 97.9 (20 Nov 2019 04:43), Max: 98.8 (19 Nov 2019 20:18)  HR: 98 (20 Nov 2019 13:47) (98 - 122)  BP: 105/58 (20 Nov 2019 13:47) (103/50 - 115/58)  BP(mean): --  RR: 18 (20 Nov 2019 13:47) (18 - 18)  SpO2: 91% (20 Nov 2019 08:36) (82% - 98%)    TESTS & MEASUREMENTS:                        7.8    14.57 )-----------( 250      ( 20 Nov 2019 07:01 )             23.6     11-20    139  |  101  |  20  ----------------------------<  151<H>  4.0   |  25  |  0.6<L>    Ca    7.7<L>      20 Nov 2019 07:01  Mg     1.9     11-20          Culture - Urine (collected 11-16-19 @ 01:11)  Source: .Urine Clean Catch (Midstream)  Final Report (11-17-19 @ 09:03):    No growth    Culture - Blood (collected 11-15-19 @ 22:45)  Source: .Blood Blood-Peripheral  Preliminary Report (11-17-19 @ 07:01):    No growth to date.    Culture - Blood (collected 11-15-19 @ 22:45)  Source: .Blood Blood-Peripheral  Preliminary Report (11-17-19 @ 07:01):    No growth to date.            RADIOLOGY & ADDITIONAL TESTS:  Personal review of radiological diagnostics performed  Echo and EKG results noted when applicable.     ANTIBIOTICS:  cefepime   IVPB 2000 milliGRAM(s) IV Intermittent every 8 hours

## 2019-11-20 NOTE — PROGRESS NOTE ADULT - ASSESSMENT
79 year old male with PMHx of COPD, Interstitial lung disease, BPH, Dysphagia s/p PEG presenting due to fever and altered mental status, admitted for sepsis, clinically stable on antibiotics.     #Sepsis on admit (resolving) secondary to LLL likely postobstructive pneumonia vs. chronic aspiration pneumonitis  #COPD Exacerbation w/ acute hypoxic resp failure (now needing O2)  #chronic Intersitial lung disease  -11/16: CTA : No evidence of central or segmental pulmonary emboli. Small volume pneumomediastinum. Apparent soft tissue density in the region of the left hilum with cutoff of the left lower lobe bronchus and patchy bilaterallower lobe  airspace opacities and few mildly enlarged mediastinal lymph nodes. This is new since July 2019 and an underlying neoplastic process with a post obstructive pneumonia cannot be ruled out. Further evaluation with bronchoscopy may be of use.  -pulm is following, rec: Continue antibx as per infectious disease team. High risk for bronchoscopy without intubation; FAMILY DECLINES BRONCHOSCOPY  -continue w chest PT, Aggressive pulmonary toilet, incentive spirometry, HOB >45, Aspiration precautions  -ID is following: rec vanc and cefepime, vacn held for trough of 30.7 on 11/17, repeat level this am at 9, resume vanc 500 gq12 given low body weight, d/c'ed 11/20, MRSA negative  -11/16: Ucx x1  -11/16: Bld Cx x2 are neg  -f/u sputum culture (pending collection)  -CTA to r/o PE as pt has desatted and has intermittent tachycardia     # hypotension - s/p fluid bolus; prob 2/2 sepsis (? bleed - see below)  -c/w  midodrine 5mg peg q8    # CT Chest with possible Neoplastic process with a post obstructive pneumonia, mild LN and sm vol pneumomediastinum  -pulm was consulted, rec: continue Antibx as per infectious disease team. Follow up cultures. High risk for bronchoscopy without intubation; family declines bronchoscopy  - continue with chest PT, Aggressive pulmonary toilet, HOB >45, Aspiration precautions    # normo anemia; drop could be secondary to fluid bolus  -T&S- order on 11/22  -keep hgb > 7  -if need to give tx, then do guaiac stool x1    # Hypernatremia - resolved  BMP q24    # Failure to thrive with Severe Malnutrition - s/p PEG  -Replete lytes PRN   -family wanted to see if pt could eat again, even for comfort   -primary means of feeding by peg, OK per S+S for comfort feeds    # mild steroid-induced hyperglycemia, resolved  -f/u FS    # DVT PPX: Lovenox - would need to hold if anemia is issue, kassi if guaiac +  # GI PPX: Pantoprazole peg  # Activity: can be OOB to chair w/ asst  Dispo: once stable, plan will be to d/c home, CTA to r/o PE 79 year old male with PMHx of COPD, Interstitial lung disease, BPH, Dysphagia s/p PEG presenting due to fever and altered mental status, admitted for sepsis, clinically stable on antibiotics.     #Sepsis on admit (resolving) secondary to LLL likely postobstructive pneumonia vs. chronic aspiration pneumonitis  #COPD Exacerbation w/ acute hypoxic resp failure (now needing O2)  #chronic Intersitial lung disease  -11/16: CTA : No evidence of central or segmental pulmonary emboli. Small volume pneumomediastinum. Apparent soft tissue density in the region of the left hilum with cutoff of the left lower lobe bronchus and patchy bilaterallower lobe  airspace opacities and few mildly enlarged mediastinal lymph nodes. This is new since July 2019 and an underlying neoplastic process with a post obstructive pneumonia cannot be ruled out. Further evaluation with bronchoscopy may be of use.  -pulm is following, rec: Continue antibx as per infectious disease team. High risk for bronchoscopy without intubation; FAMILY DECLINES BRONCHOSCOPY  -continue w chest PT, Aggressive pulmonary toilet, incentive spirometry, HOB >45, Aspiration precautions  -ID is following: rec vanc and cefepime, vacn held for trough of 30.7 on 11/17, repeat level this am at 9, resume vanc 500 gq12 given low body weight, d/c'ed 11/20, MRSA negative  -11/16: Ucx x1  -11/16: Bld Cx x2 are neg  -f/u sputum culture (pending collection)  -11/20: CTA : No central pulmonary embolus.Bilateral lower lobe predominant consolidation left greater than right compatible with pneumonia with interval worsening demonstrated by new groundglass opacities in the right lower lobe. Recommend follow-up noncontrast CT chest in 8 weeks to demonstrate resolution.Reaeration of the left lower lobe bronchus.Near complete resolution of the pneumomediastinum.      # hypotension - s/p fluid bolus; prob 2/2 sepsis (? bleed - see below)  -c/w  midodrine 5mg peg q8    # CT Chest with possible Neoplastic process with a post obstructive pneumonia, mild LN and sm vol pneumomediastinum  -pulm was consulted, rec: continue Antibx as per infectious disease team. Follow up cultures. High risk for bronchoscopy without intubation; family declines bronchoscopy  - continue with chest PT, Aggressive pulmonary toilet, HOB >45, Aspiration precautions    # normo anemia; drop could be secondary to fluid bolus  -T&S- order on 11/22  -keep hgb > 7  -if need to give tx, then do guaiac stool x1    # Hypernatremia - resolved  BMP q24    # Failure to thrive with Severe Malnutrition - s/p PEG  -Replete lytes PRN   -family wanted to see if pt could eat again, even for comfort   -primary means of feeding by peg, OK per S+S for comfort feeds    # mild steroid-induced hyperglycemia, resolved  -f/u FS    # DVT PPX: Lovenox - would need to hold if anemia is issue, kassi if guaiac +  # GI PPX: Pantoprazole peg  # Activity: can be OOB to chair w/ asst  Dispo: once stable, plan will be to d/c home

## 2019-11-20 NOTE — PROGRESS NOTE ADULT - ASSESSMENT
IMPRESSION:  Bibasilar opacities/ recurrent aspiration  HO interstitial lung disease  HO chronic obstructive pulmonary disease  HO failure to thrive s/p PEG    PLAN:    ABX dc vanco  Follow up cultures  HOB >45  Aspiration precautions  OOB to chair  DNR/DNI  dc planning on oxygen  poor prognosis

## 2019-11-21 NOTE — CHART NOTE - NSCHARTNOTEFT_GEN_A_CORE
Registered Dietitian Follow-Up     Patient Profile Reviewed                           Yes [x]   No []     Nutrition History Previously Obtained        Yes [x]  No []       Pertinent Subjective Information:  -Per RN, pt tolerating TF regimen at goal rate. No GI s/s intolerance to TF. Aspiration precautions being followed and improving pt response to feed. No PO comfort feeds as per SLP. No further nutrition interventions at this time. Continue current TF regimen.      Pertinent Medical Interventions:  1. Sepsis POA 2/2  LLL likely postobstructive PNA vs. chronic aspiration pneumonitis   --family decline bronchoscopy. s/p repeat CTA 11/20: No central pulmonary embolus. B/l lower lobe predominant consolidation L > R compatible with PNA with interval worsening demonstrated by new groundglass opacities in the right lower lobe. Near complete resolution of the pneumomediastinum. Reaeration of  LLL bronchus  --pulm following: Aggressive pulmonary toilet, incentive spirometry. Aspiration precautions, HOB >45degrees.   --ID following. MRSA negative 11/20. cefepime to be completed 11/22. sputum culture pending   2. COPD Exacerbation w/ acute hypoxic resp failure, stable   3. Normocytic anemia  --T&S 11/22     Diet order: Jevity 1.2 @360mL q8H (1296kcal, 60g protein, 100% RDIs and 875mL free H2O)     Anthropometrics:  - Ht. 165.1cm   - Wt. 39.3kg (11/16), no new wt   - BMI 14.4   - IBW     Pertinent Lab Data: (11/21/19) POCT 173/104  (11/20/19) WBC 14.57, RBC 2.84, H/H 7.8/23.6, Cr 0.6, glucose 151, Ca 7.7      Pertinent Meds: lovenox, cefepime, albuterol, protonix, multivitamin      Physical Findings:  - Appearance: sleeping soundly. not alert and [place]mese speaking at baseline   - GI function: fecal incontinence with LBM 11/19   - Tubes:  - Oral/Mouth cavity: per SLP 11/18: strict no PO intake d/t known history of severe pharyngeal dysphagia and high risk for aspiration at this time. PEG to provide primary source of nutrition   - Skin: stage II pressure ulcers to R hip and sacral spine. no edema      Nutrition Requirements  Weight Used: 39.3kg admit wt. (continued from RD initial assessment).      estimated calorie needs = ~1180-1375kcal/day (30-35 kcal/kg CBW d/t severe PCM and multiple pressure ulcers).   estimated protein needs = 49-59 g/day (1.25-1.5 g/kg CBW, reasons mentioned above).   estimated fluid needs = per pulm.     Nutrient Intake: TF provides 94% kcal needs and 101% protein needs      [] Previous Nutrition Diagnosis: (1) inadequate energy intake, resolved (2) severe PCM, ongoing but meetign increased needs with current TF regimen             [] Ongoing          [] Resolved    [x] No active nutrition diagnosis identified at this time     Nutrition Intervention: enteral nutrition  Recommend:  1. Maintina HOB >45 degrees during and after feed x30min to optimize tolerance and reduce risk of aspiration.   Continue regimen of Jevity 1.2 @ 360mL q8H. TF provides 1296kcal, 60g protein, 100% RDIs and 875mL free H2O. Flushes per LIP.    2. Consider d/c multivitamin with TF adjustment as formula will meet 100% RDIs.      Goal/Expected Outcome: TF to continue provide >85% but <105% estimated needs throughout LOS. Reassess in 7 days.      Indicator/Monitoring: RD will monitor diet order, energy intake, nutrition related labs, body composition, NFPF (TF tolerance).

## 2019-11-21 NOTE — PROGRESS NOTE ADULT - ASSESSMENT
· Assessment		  79y old Male with a past medical history of COPD (quit smoking at 58 yrs, not on home O2), ILD, BPH and dysphagia (patient has swallowing defect as evaluated on last admission, requiring PEG insertion for adequate nutrition). Patient is presenting for fever and SOB. CT angio chest showed LLL opacities, enlarged mediastinal lymph nodes and possible post-obstructive pneumnia.   < from: CT Angio Chest w/ IV Cont (11.20.19 @ 12:54) >  No central pulmonaryembolus.  Bilateral lower lobe predominant consolidation left greater than right   compatible with pneumonia with interval worsening demonstrated by new   groundglass opacities in the right lower lobe. Recommend follow-up   noncontrast CT chest in 8 weeks to demonstrate resolution.  Reaeration of the left lower lobe bronchus.  Near complete resolution of the pneumomediastinum.        IMPRESSION:  Bibasilar GNR/ORSA PNA  -CT chest 11/20 as above  -atypical Mycobacteria in DDX  -chronic ILD  -possible aspiration  -legionella NG  -BCx NG  -nares ORSA NG  -WBC 22.7>18.2>14.5      RECOMMENDATIONS:  Cefepime 2 gm iv q12h  Aspiration/ pressure ulcer prophylaxis

## 2019-11-21 NOTE — PROGRESS NOTE ADULT - SUBJECTIVE AND OBJECTIVE BOX
SUBJECTIVE:    Patient is a 79y old Male who presents with a chief complaint of Sepsis (20 Nov 2019 16:31)    Currently admitted to medicine with the primary diagnosis of Sepsis     Today is hospital day 5d. No somatic complaints.     INTERVAL EVENTS: NAEON, off O2    PAST MEDICAL & SURGICAL HISTORY  Emphysematous COPD  Status post insertion of percutaneous endoscopic gastrostomy (PEG) tube      ALLERGIES:  No Known Allergies    MEDICATIONS:  STANDING MEDICATIONS  albuterol/ipratropium for Nebulization 3 milliLiter(s) Nebulizer every 6 hours  albuterol/ipratropium for Nebulization. 3 milliLiter(s) Nebulizer once  cefepime   IVPB 2000 milliGRAM(s) IV Intermittent every 8 hours  enoxaparin Injectable 40 milliGRAM(s) SubCutaneous daily  influenza   Vaccine 0.5 milliLiter(s) IntraMuscular once  midodrine 5 milliGRAM(s) Oral every 8 hours  multivitamin 1 Tablet(s) Oral daily  pantoprazole   Suspension 40 milliGRAM(s) Enteral Tube daily    PRN MEDICATIONS    VITALS:   T(F): 97.9  HR: 94  BP: 97/52  RR: 18  SpO2: 94%    LABS:                        7.8    14.57 )-----------( 250      ( 20 Nov 2019 07:01 )             23.6     11-20    139  |  101  |  20  ----------------------------<  151<H>  4.0   |  25  |  0.6<L>    Ca    7.7<L>      20 Nov 2019 07:01  Mg     1.9     11-20        PHYSICAL EXAM:  GEN: No acute distress, cachetic   PULM/CHEST: Clear to auscultation bilaterally, no rales, rhonchi or wheezes   CVS: normal rate, regular rhythm, S1-S2, no murmurs  ABD: Soft, non-tender, non-distended, +BS, peg in place w abd binder over   EXT: No edema  NEURO: AAOx1

## 2019-11-21 NOTE — PROGRESS NOTE ADULT - SUBJECTIVE AND OBJECTIVE BOX
KEN RAMIREZ  79y, Male    All available historical data reviewed    OVERNIGHT EVENTS:    none  ROS:  General: Denies rigors, nightsweats  HEENT: Denies headache, rhinorrhea, sore throat, eye pain  CV: Denies CP, palpitations  PULM: Denies wheezing, hemoptysis  GI: Denies hematemesis, hematochezia, melena  : Denies discharge, hematuria  MSK: Denies arthralgias, myalgias  SKIN: Denies rash, lesions  NEURO: Denies paresthesias, weakness  PSYCH: Denies depression, anxiety    VITALS:  T(F): 97.9, Max: 98.1 (11-20-19 @ 20:17)  HR: 94  BP: 97/52  RR: 18Vital Signs Last 24 Hrs  T(C): 36.6 (21 Nov 2019 04:55), Max: 36.7 (20 Nov 2019 20:17)  T(F): 97.9 (21 Nov 2019 04:55), Max: 98.1 (20 Nov 2019 20:17)  HR: 94 (21 Nov 2019 04:55) (94 - 108)  BP: 97/52 (21 Nov 2019 04:55) (90/54 - 105/58)  BP(mean): --  RR: 18 (21 Nov 2019 04:55) (18 - 18)  SpO2: 94% (21 Nov 2019 08:04) (93% - 98%)    TESTS & MEASUREMENTS:                        7.8    14.57 )-----------( 250      ( 20 Nov 2019 07:01 )             23.6     11-20    139  |  101  |  20  ----------------------------<  151<H>  4.0   |  25  |  0.6<L>    Ca    7.7<L>      20 Nov 2019 07:01  Mg     1.9     11-20          Culture - Urine (collected 11-16-19 @ 01:11)  Source: .Urine Clean Catch (Midstream)  Final Report (11-17-19 @ 09:03):    No growth    Culture - Blood (collected 11-15-19 @ 22:45)  Source: .Blood Blood-Peripheral  Final Report (11-21-19 @ 07:01):    No growth at 5 days.    Culture - Blood (collected 11-15-19 @ 22:45)  Source: .Blood Blood-Peripheral  Final Report (11-21-19 @ 07:00):    No growth at 5 days.            RADIOLOGY & ADDITIONAL TESTS:  Personal review of radiological diagnostics performed  Echo and EKG results noted when applicable.     ANTIBIOTICS:  cefepime   IVPB 2000 milliGRAM(s) IV Intermittent every 8 hours

## 2019-11-21 NOTE — PROGRESS NOTE ADULT - ASSESSMENT
79 year old male with PMHx of COPD, Interstitial lung disease, BPH, Dysphagia s/p PEG presenting due to fever and altered mental status, admitted for sepsis, clinically stable on antibiotics.     #Sepsis on admit secondary to LLL likely postobstructive pneumonia vs. chronic aspiration pneumonitis- interval worsening of pna   #COPD Exacerbation w/ acute hypoxic resp failure, stable  #chronic Intersitial lung disease, stable  -11/16: CTA : No evidence of central or segmental pulmonary emboli. Small volume pneumomediastinum. Apparent soft tissue density in the region of the left hilum with cutoff of the left lower lobe bronchus and patchy bilaterallower lobe  airspace opacities and few mildly enlarged mediastinal lymph nodes. This is new since July 2019 and an underlying neoplastic process with a post obstructive pneumonia cannot be ruled out. Further evaluation with bronchoscopy may be of use.  -pulm rec: continue antibx as per infectious disease team. High risk for bronchoscopy without intubation; FAMILY DECLINES BRONCHOSCOPY  -continue w chest PT, Aggressive pulmonary toilet, incentive spirometry, HOB >45, Aspiration precautions  -ID is following: rec vanc and cefepime, vacn held for trough of 30.7 on 11/17, repeat level this am at 9, resume vanc 500 gq12 given low body weight, d/c'ed as MRSA neg 11/20  - on day 6 of cefepime, to be completed 11/22  -11/16: Ucx x1  -11/16: Bld Cx x2 are neg  -f/u sputum culture (pending collection)  -11/20: CTA : No central pulmonary embolus. Bilateral lower lobe predominant consolidation left greater than right compatible with pneumonia with interval worsening demonstrated by new groundglass opacities in the right lower lobe. Recommend follow-up noncontrast CT chest in 8 weeks to demonstrate resolution. Reaeration of the left lower lobe bronchus. Near complete resolution of the pneumomediastinum.    # hypotension - s/p fluid bolus; prob 2/2 sepsis (? bleed - see below)  -c/w  midodrine 5mg peg q8    # CT Chest with possible Neoplastic process with a post obstructive pneumonia, mild LN and sm vol pneumomediastinum  -pulm was consulted, see above  - continue with chest PT, Aggressive pulmonary toilet, HOB >45, Aspiration precautions    # normo anemia; drop could be secondary to fluid bolus  -T&S- placed for 11/22  -keep hgb > 7  -if need to give tx, then do guaiac stool x1    # Failure to thrive with Severe Malnutrition - s/p PEG  -Replete lytes PRN   -family wanted to see if pt could eat again, even for comfort   -primary means of feeding by peg, OK per S+S for comfort feeds    # mild steroid-induced hyperglycemia, resolved  -f/u FS    # DVT PPX: Lovenox - would need to hold if anemia is issue, kassi if guaiac +  # GI PPX: Pantoprazole peg  # Activity: can be OOB to chair w/ asst  Dispo: once stable, plan will be to d/c home, anticipated for tmrw, ?OP palliative if family agrees

## 2019-11-22 NOTE — PROGRESS NOTE ADULT - ASSESSMENT
· Assessment		  79y old Male with a past medical history of COPD (quit smoking at 58 yrs, not on home O2), ILD, BPH and dysphagia (patient has swallowing defect as evaluated on last admission, requiring PEG insertion for adequate nutrition). Patient is presenting for fever and SOB. CT angio chest showed LLL opacities, enlarged mediastinal lymph nodes and possible post-obstructive pneumnia.   < from: CT Angio Chest w/ IV Cont (11.20.19 @ 12:54) >  No central pulmonaryembolus.  Bilateral lower lobe predominant consolidation left greater than right   compatible with pneumonia with interval worsening demonstrated by new   groundglass opacities in the right lower lobe. Recommend follow-up   noncontrast CT chest in 8 weeks to demonstrate resolution.  Reaeration of the left lower lobe bronchus.  Near complete resolution of the pneumomediastinum.        IMPRESSION:  Bibasilar GNR/ORSA PNA  -CT chest 11/20 as above  -atypical Mycobacteria in DDX  -chronic ILD  -possible aspiration  -legionella NG  -BCx NG  -nares ORSA NG  -WBC 22.7>18.2>14.5>10.8  CXR 11/22 reviewed      RECOMMENDATIONS:  Cefepime 2 gm iv q12h for 7 days starting 11/16  D/c vancomycin  Aspiration/ pressure ulcer prophylaxis  recall prn please

## 2019-11-22 NOTE — PROGRESS NOTE ADULT - GASTROINTESTINAL DETAILS
no rebound tenderness/nontender/soft/no guarding
soft/nontender
soft/no rigidity/no rebound tenderness/no guarding/nontender

## 2019-11-22 NOTE — PROGRESS NOTE ADULT - SUBJECTIVE AND OBJECTIVE BOX
SUBJECTIVE:    Patient is a 79y old Male who presents with a chief complaint of Sepsis (21 Nov 2019 11:06)    Currently admitted to medicine with the primary diagnosis of Sepsis     Today is hospital day 6d. Desatted yesterday on RA in the late afternoon, placed back on O2, improved, now satting well on 3L O2. No somatic complaints   Slightly tachycardic, pt is known to have intermittent tachycardia     ALLERGIES:  No Known Allergies    MEDICATIONS:  STANDING MEDICATIONS  albuterol/ipratropium for Nebulization 3 milliLiter(s) Nebulizer every 6 hours  albuterol/ipratropium for Nebulization. 3 milliLiter(s) Nebulizer once  cefepime   IVPB 2000 milliGRAM(s) IV Intermittent every 8 hours  enoxaparin Injectable 40 milliGRAM(s) SubCutaneous daily  influenza   Vaccine 0.5 milliLiter(s) IntraMuscular once  midodrine 5 milliGRAM(s) Oral every 8 hours  multivitamin 1 Tablet(s) Oral daily  pantoprazole   Suspension 40 milliGRAM(s) Enteral Tube daily    PRN MEDICATIONS    VITALS:   T(F): 97.5  HR: 93  BP: 96/54  RR: 18  SpO2: 92%    LABS:                        7.0    10.86 )-----------( 293      ( 22 Nov 2019 05:47 )             22.0     11-22    139  |  102  |  24<H>  ----------------------------<  121<H>  4.4   |  25  |  0.5<L>    Ca    7.9<L>      22 Nov 2019 05:47  Mg     2.0     11-22        PHYSICAL EXAM:  GEN: No acute distress, cachetic male in bed   PULM/CHEST: Clear to auscultation bilaterally, no rales, rhonchi or wheezes   CVS: normal rate, regular rhythm, S1-S2, no murmurs  ABD: Soft, non-tender, non-distended, +BS  EXT: No edema   NEURO: Non focal, [place] speaking

## 2019-11-22 NOTE — PROGRESS NOTE ADULT - ASSESSMENT
79 year old male with PMHx of COPD, Interstitial lung disease, BPH, Dysphagia s/p PEG presenting due to fever and altered mental status, admitted for sepsis, clinically stable on antibiotics    #Sepsis on admit secondary to LLL likely postobstructive pneumonia vs. chronic aspiration pneumonitis- interval worsening of pna   #COPD Exacerbation w/ acute hypoxic resp failure, stable  #chronic Intersitial lung disease, stable  -11/16: CTA : No evidence of central or segmental pulmonary emboli. Small volume pneumomediastinum. Apparent soft tissue density in the region of the left hilum with cutoff of the left lower lobe bronchus and patchy bilaterallower lobe  airspace opacities and few mildly enlarged mediastinal lymph nodes. This is new since July 2019 and an underlying neoplastic process with a post obstructive pneumonia cannot be ruled out. Further evaluation with bronchoscopy may be of use.  -Pulm rec: High risk for bronchoscopy without intubation; FAMILY DECLINES BRONCHOSCOPY  -continue w chest PT, Aggressive pulmonary toilet, incentive spirometry, HOB >45, Aspiration precautions  -ID is following: rec vanc and cefepime, vacn held for trough of 30.7 on 11/17, repeat level this am at 9, resume vanc 500 gq12 given low body weight, d/c'ed as MRSA neg 11/20  - on day 7 of cefepime, repeat CXR 11/22 improved from prior, as pt is needing intermittent O2, plan for a 10day course - to end 11/25  -11/16: Ucx x1  -11/16: Bld Cx x2 are neg  -f/u sputum culture (pending collection)  -11/20: CTA : No central pulmonary embolus. Bilateral lower lobe predominant consolidation left greater than right compatible with pneumonia with interval worsening demonstrated by new groundglass opacities in the right lower lobe. Recommend follow-up noncontrast CT chest in 8 weeks to demonstrate resolution. Reaeration of the left lower lobe bronchus. Near complete resolution of the pneumomediastinum.    # hypotension - s/p fluid bolus; prob 2/2 sepsis (? bleed - see below)  -c/w  midodrine 5mg peg q8    # normo anemia; drop could be secondary to fluid bolus  -T&S- place for 11/24  -keep hgb > 7  -if need to give tx, then do guaiac stool x1  -11/22: Hg 7, d/c lovenox, will place on SCDs, f/u tmrw cbc    # Failure to thrive with Severe Malnutrition - s/p PEG  -Replete lytes PRN   -family wanted to see if pt could eat again, even for comfort   -primary means of feeding by peg, OK per S+S for comfort feeds    # mild steroid-induced hyperglycemia, resolved  -f/u FS    # DVT PPX: Lovenox held on 11/22, SCDs  # GI PPX: Pantoprazole peg  # Activity: can be OOB to chair w/ asst  Dispo: once stable, plan will be to d/c home per family wishes, ?OP palliative if family agrees 79 year old male with PMHx of COPD, Interstitial lung disease, BPH, Dysphagia s/p PEG presenting due to fever and altered mental status, admitted for sepsis, clinically stable on antibiotics    #Sepsis on admit secondary to LLL likely postobstructive pneumonia vs. chronic aspiration pneumonitis- interval worsening of pna   #COPD Exacerbation w/ acute hypoxic resp failure, stable  #chronic Intersitial lung disease, stable  -11/16: CTA : No evidence of central or segmental pulmonary emboli. Small volume pneumomediastinum. Apparent soft tissue density in the region of the left hilum with cutoff of the left lower lobe bronchus and patchy bilaterallower lobe  airspace opacities and few mildly enlarged mediastinal lymph nodes. This is new since July 2019 and an underlying neoplastic process with a post obstructive pneumonia cannot be ruled out. Further evaluation with bronchoscopy may be of use.  -Pulm rec: High risk for bronchoscopy without intubation; FAMILY DECLINES BRONCHOSCOPY  -continue w chest PT, Aggressive pulmonary toilet, incentive spirometry, HOB >45, Aspiration precautions  -ID is following: rec vanc and cefepime, vacn held for trough of 30.7 on 11/17, repeat level this am at 9, resume vanc 500 gq12 given low body weight, d/c'ed as MRSA neg 11/20  - on day 7 of cefepime, repeat CXR 11/22 improved from prior, as pt is needing intermittent O2, plan for a 10day course - to end 11/25  -try to wean off O2  -11/16: Ucx x1  -11/16: Bld Cx x2 are neg  -f/u sputum culture (pending collection)  -11/20: CTA : No central pulmonary embolus. Bilateral lower lobe predominant consolidation left greater than right compatible with pneumonia with interval worsening demonstrated by new groundglass opacities in the right lower lobe. Recommend follow-up noncontrast CT chest in 8 weeks to demonstrate resolution. Reaeration of the left lower lobe bronchus. Near complete resolution of the pneumomediastinum.    # hypotension - s/p fluid bolus; prob 2/2 sepsis (? bleed - see below)  -c/w  midodrine 5mg peg q8    # normo anemia; drop could be secondary to fluid bolus  -T&S- place for 11/24  -keep hgb > 7  -if need to give tx, then do guaiac stool x1  -11/22: Hg 7, d/c lovenox, will place on SCDs, f/u tmrw cbc    # Failure to thrive with Severe Malnutrition - s/p PEG  -Replete lytes PRN   -family wanted to see if pt could eat again, even for comfort   -primary means of feeding by peg, OK per S+S for comfort feeds    # mild steroid-induced hyperglycemia, resolved  -f/u FS    # DVT PPX: Lovenox held on 11/22, SCDs  # GI PPX: Pantoprazole peg  # Activity: can be OOB to chair w/ asst  Dispo: once stable, 10day course of abx, plan will be to d/c home per family wishes, ?OP palliative if family agrees, see if able to wean off O2

## 2019-11-23 NOTE — PROGRESS NOTE ADULT - ASSESSMENT
79 year old male with PMHx of COPD, Interstitial lung disease, BPH, Dysphagia s/p PEG presenting due to fever and altered mental status, admitted for sepsis, clinically stable on antibiotics    #Sepsis on admit secondary to LLL likely postobstructive pneumonia vs. chronic aspiration pneumonitis- interval worsening of pna   #COPD Exacerbation w/ acute hypoxic resp failure, stable  #chronic Intersitial lung disease, stable  -11/16: CTA : No evidence of central or segmental pulmonary emboli. Small volume pneumomediastinum. Apparent soft tissue density in the region of the left hilum with cutoff of the left lower lobe bronchus and patchy bilaterallower lobe  airspace opacities and few mildly enlarged mediastinal lymph nodes. This is new since July 2019 and an underlying neoplastic process with a post obstructive pneumonia cannot be ruled out. Further evaluation with bronchoscopy may be of use.  -Pulm rec: High risk for bronchoscopy without intubation; FAMILY DECLINES BRONCHOSCOPY  -continue w chest PT, Aggressive pulmonary toilet, incentive spirometry, HOB >45, Aspiration precautions  -ID is following: rec vanc and cefepime, vacn held for trough of 30.7 on 11/17, repeat level this am at 9, resumed vanc 500 gq12 given low body weight, d/c'ed as MRSA neg 11/20  -s/p 8 days of cefepime, infectious disease team rec 7 days but primary team thought considering that pt was needing O2, to finish a 10day course. new plan is to d/c today, so pt received a total of 8 days of cefepime  - CXR 11/22 stable basilar opacities  -CXR 11/23: stable, satting 97% on RA  -11/16: Ucx x1  -11/16: Bld Cx x2 are neg  -11/20: CTA : No central pulmonary embolus. Bilateral lower lobe predominant consolidation left greater than right compatible with pneumonia with interval worsening demonstrated by new groundglass opacities in the right lower lobe. Recommend follow-up noncontrast CT chest in 8 weeks to demonstrate resolution. Reaeration of the left lower lobe bronchus. Near complete resolution of the pneumomediastinum.    # hypotension - s/p fluid bolus; prob 2/2 sepsis (? bleed - see below)  -c/w  midodrine 5mg peg q8    # normo anemia; drop could be secondary to fluid bolus  -T&S- placed for 11/24  -keep hgb > 7  -if need to give tx, then do guaiac stool x1  -11/22: Hg 7, d/c lovenox, placed on SCDs,    # Failure to thrive with Severe Malnutrition - s/p PEG  -Replete lytes PRN   -family wanted to see if pt could eat again, even for comfort   -primary means of feeding by peg, OK per S+S for comfort feeds    # mild steroid-induced hyperglycemia, resolved  -d/c FS 11/23    # DVT PPX: Lovenox held on 11/22, SCDs  # GI PPX: Pantoprazole peg  # Activity: can be OOB to chair w/ asst  Dispo: once stable, d/c home per family wishes, ?OP palliative if family agrees 79 year old male with PMHx of COPD, Interstitial lung disease, BPH, Dysphagia s/p PEG presenting due to fever and altered mental status, admitted for sepsis, clinically stable on antibiotics    #Sepsis on admit secondary to LLL likely postobstructive pneumonia vs. chronic aspiration pneumonitis- interval worsening of pna   #COPD Exacerbation w/ acute hypoxic resp failure, stable  #chronic Intersitial lung disease, stable  -11/16: CTA : No evidence of central or segmental pulmonary emboli. Small volume pneumomediastinum. Apparent soft tissue density in the region of the left hilum with cutoff of the left lower lobe bronchus and patchy bilaterallower lobe  airspace opacities and few mildly enlarged mediastinal lymph nodes. This is new since July 2019 and an underlying neoplastic process with a post obstructive pneumonia cannot be ruled out. Further evaluation with bronchoscopy may be of use.  -Pulm rec: High risk for bronchoscopy without intubation; FAMILY DECLINES BRONCHOSCOPY  -continue w chest PT, Aggressive pulmonary toilet, incentive spirometry, HOB >45, Aspiration precautions  -ID is following: rec vanc and cefepime, vacn held for trough of 30.7 on 11/17, repeat level this am at 9, resumed vanc 500 gq12 given low body weight, d/c'ed as MRSA neg 11/20  -s/p 8 days of cefepime, infectious disease team rec 7 days but primary team thought considering that pt was needing O2, to finish a 10day course. new plan is to d/c today, so pt received a total of 8 days of cefepime  - CXR 11/22 stable basilar opacities  -CXR 11/23: stable, satting 97% on RA  -duo nebs q6 PRN, attempt to wean off O2 (to avoid side effects)  -11/16: Ucx x1  -11/16: Bld Cx x2 are neg  -11/20: CTA : No central pulmonary embolus. Bilateral lower lobe predominant consolidation left greater than right compatible with pneumonia with interval worsening demonstrated by new groundglass opacities in the right lower lobe. Recommend follow-up noncontrast CT chest in 8 weeks to demonstrate resolution. Reaeration of the left lower lobe bronchus. Near complete resolution of the pneumomediastinum.    # hypotension - s/p fluid bolus; prob 2/2 sepsis (? bleed - see below)  -c/w  midodrine 5mg peg q8    # normo anemia; drop could be secondary to fluid bolus  -T&S- placed for 11/24  -keep hgb > 7  -if need to give tx, then do guaiac stool x1  -11/22: Hg 7, d/c lovenox, placed on SCDs,    # Failure to thrive with Severe Malnutrition - s/p PEG  -Replete lytes PRN   -family wanted to see if pt could eat again, even for comfort   -primary means of feeding by peg, OK per S+S for comfort feeds    # mild steroid-induced hyperglycemia, resolved  -d/c FS 11/23    # DVT PPX: Lovenox held on 11/22, SCDs  # GI PPX: Pantoprazole peg  # Activity: can be OOB to chair w/ asst  Dispo: once stable, d/c home per family wishes, ?OP palliative if family agrees 79 year old male with PMHx of COPD, Interstitial lung disease, BPH, Dysphagia s/p PEG presenting due to fever and altered mental status, admitted for sepsis, clinically stable on antibiotics    #Sepsis on admit secondary to LLL likely postobstructive pneumonia vs. chronic aspiration pneumonitis- interval worsening of pna   #COPD Exacerbation w/ acute hypoxic resp failure, stable  #chronic Intersitial lung disease, stable  -11/16: CTA : No evidence of central or segmental pulmonary emboli. Small volume pneumomediastinum. Apparent soft tissue density in the region of the left hilum with cutoff of the left lower lobe bronchus and patchy bilaterallower lobe  airspace opacities and few mildly enlarged mediastinal lymph nodes. This is new since July 2019 and an underlying neoplastic process with a post obstructive pneumonia cannot be ruled out. Further evaluation with bronchoscopy may be of use.  -Pulm rec: High risk for bronchoscopy without intubation; FAMILY REFUSED BRONCHOSCOPY  -continue w chest PT, Aggressive pulmonary toilet, incentive spirometry, HOB >45, Aspiration precautions  -ID is following: rec vanc and cefepime, vacn held for trough of 30.7 on 11/17, repeat level this am at 9, resumed vanc 500 gq12 given low body weight, d/c'ed as MRSA neg 11/20  -s/p 8 days of cefepime, infectious disease team rec 7 days but primary team thought considering that pt was needing O2, to finish a 10day course. new plan is to d/c today, so pt received a total of 8 days of cefepime  - CXR 11/22 stable basilar opacities  -CXR 11/23: stable, satting 97% on RA  -duo nebs q6 PRN, attempt to wean off O2 (to avoid side effects)  -11/16: Ucx x1  -11/16: Bld Cx x2 are neg  -11/20: CTA : No central pulmonary embolus. Bilateral lower lobe predominant consolidation left greater than right compatible with pneumonia with interval worsening demonstrated by new groundglass opacities in the right lower lobe. Recommend follow-up noncontrast CT chest in 8 weeks to demonstrate resolution. Reaeration of the left lower lobe bronchus. Near complete resolution of the pneumomediastinum.    # hypotension - s/p fluid bolus; prob 2/2 sepsis (? bleed - see below)  -c/w  midodrine 5mg peg q8    # normo anemia; drop could be secondary to fluid bolus  -T&S- placed for 11/24  -keep hgb > 7  -if need to give tx, then do guaiac stool x1  -11/22: Hg 7, d/c lovenox, placed on SCDs,    # Failure to thrive with Severe Malnutrition - s/p PEG  -Replete lytes PRN   -family wanted to see if pt could eat again, even for comfort   -primary means of feeding by peg, OK per S+S for comfort feeds    # mild steroid-induced hyperglycemia, resolved  -d/c FS 11/23    # DVT PPX: Lovenox held on 11/22, SCDs  # GI PPX: Pantoprazole peg  # Activity: can be OOB to chair w/ asst  Dispo: once stable, d/c home per family wishes, ?OP palliative if family agrees

## 2019-11-23 NOTE — PROGRESS NOTE ADULT - SUBJECTIVE AND OBJECTIVE BOX
SUBJECTIVE:    Patient is a 79y old Male who presents with a chief complaint of Sepsis (22 Nov 2019 12:18)    Currently admitted to medicine with the primary diagnosis of Sepsis     Today is hospital day 7d. Making noises, no acute distress. Off NC, on room air    INTERVAL EVENTS: NAEON     PAST MEDICAL & SURGICAL HISTORY  Emphysematous COPD  Status post insertion of percutaneous endoscopic gastrostomy (PEG) tube      ALLERGIES:  No Known Allergies    MEDICATIONS:  STANDING MEDICATIONS  influenza   Vaccine 0.5 milliLiter(s) IntraMuscular once  midodrine 5 milliGRAM(s) Oral every 8 hours  multivitamin 1 Tablet(s) Oral daily  pantoprazole   Suspension 40 milliGRAM(s) Enteral Tube daily    PRN MEDICATIONS    VITALS:   T(F): 97  HR: 104  BP: 104/55  RR: 18  SpO2: 97%    LABS:                        8.3    11.40 )-----------( 328      ( 23 Nov 2019 07:16 )             26.8     11-23    134<L>  |  97<L>  |  22<H>  ----------------------------<  107<H>  4.3   |  25  |  0.5<L>    Ca    8.0<L>      23 Nov 2019 07:16  Mg     2.0     11-23          PHYSICAL EXAM:  GEN: No acute distress, frail cachetic male  PULM/CHEST: Clear to auscultation bilaterally, no rales, rhonchi or wheezes   CVS: normal rate, regular rhythm, S1-S2, no murmurs  ABD: Soft, non-tender, non-distended, +BS, abd binders in place over PEG   EXT: No edema  NEURO: non-focal, Malagasy speaking SUBJECTIVE:    Patient is a 79y old Male who presents with a chief complaint of Sepsis (22 Nov 2019 12:18)    Currently admitted to medicine with the primary diagnosis of Sepsis     Today is hospital day 7d. Making noises, no acute distress. Off NC, on room air    INTERVAL EVENTS: NAEON     PAST MEDICAL & SURGICAL HISTORY  Emphysematous COPD  Status post insertion of percutaneous endoscopic gastrostomy (PEG) tube      ALLERGIES:  No Known Allergies    MEDICATIONS:  STANDING MEDICATIONS  influenza   Vaccine 0.5 milliLiter(s) IntraMuscular once  midodrine 5 milliGRAM(s) Oral every 8 hours  multivitamin 1 Tablet(s) Oral daily  pantoprazole   Suspension 40 milliGRAM(s) Enteral Tube daily    PRN MEDICATIONS    VITALS:   T(F): 97  HR: 104  BP: 104/55  RR: 18  SpO2: 97%    LABS:                        8.3    11.40 )-----------( 328      ( 23 Nov 2019 07:16 )             26.8     11-23    134<L>  |  97<L>  |  22<H>  ----------------------------<  107<H>  4.3   |  25  |  0.5<L>    Ca    8.0<L>      23 Nov 2019 07:16  Mg     2.0     11-23      PHYSICAL EXAM:  GEN: No acute distress, frail cachetic male  PULM/CHEST: Clear to auscultation bilaterally, no rales, rhonchi or wheezes   CVS: normal rate, regular rhythm, S1-S2, no murmurs  ABD: Soft, non-tender, non-distended, +BS, abd binders in place over PEG   EXT: No edema  NEURO: non-focal, American speaking

## 2019-11-24 NOTE — PROGRESS NOTE ADULT - ASSESSMENT
79 year old male with PMHx of COPD, Interstitial lung disease, BPH, Dysphagia s/p PEG presenting due to fever and altered mental status, admitted for sepsis, clinically stable on antibiotics    #Sepsis on admit secondary to LLL likely postobstructive pneumonia vs. chronic aspiration pneumonitis- interval worsening of pna   #COPD Exacerbation w/ acute hypoxic resp failure, stable  #chronic Intersitial lung disease, stable  -11/16: CTA : No evidence of central or segmental pulmonary emboli. Small volume pneumomediastinum. Apparent soft tissue density in the region of the left hilum with cutoff of the left lower lobe bronchus and patchy bilaterallower lobe  airspace opacities and few mildly enlarged mediastinal lymph nodes. This is new since July 2019 and an underlying neoplastic process with a post obstructive pneumonia cannot be ruled out. Further evaluation with bronchoscopy may be of use.  -Pulm rec: High risk for bronchoscopy without intubation; FAMILY REFUSED BRONCHOSCOPY  -continue w chest PT, Aggressive pulmonary toilet, incentive spirometry, HOB >45, Aspiration precautions  -ID is following: rec vanc and cefepime, vacn held for trough of 30.7 on 11/17, repeat level this am at 9, resumed vanc 500 gq12 given low body weight, d/c'ed as MRSA neg 11/20  -s/p 8 days of cefepime,   - CXR 11/22 stable basilar opacities  -CXR 11/23: stable, satting 97% on RA  -duo nebs q6 PRN, attempt to wean off O2 (to avoid side effects)  -11/16: Ucx x1  -11/16: Bld Cx x2 are neg  -11/20: CTA : No central pulmonary embolus. Bilateral lower lobe predominant consolidation left greater than right compatible with pneumonia with interval worsening demonstrated by new groundglass opacities in the right lower lobe. Recommend follow-up noncontrast CT chest in 8 weeks to demonstrate resolution. Reaeration of the left lower lobe bronchus. Near complete resolution of the pneumomediastinum.    # hypotension - s/p fluid bolus; prob 2/2 sepsis (? bleed - see below)  -c/w  midodrine 5mg peg q8    # normo anemia; drop could be secondary to fluid bolus  -T&S- placed for 11/24  -keep hgb > 7  -if need to give tx, then do guaiac stool x1  -11/22: Hg 7, d/c lovenox, placed on SCDs,    # Failure to thrive with Severe Malnutrition - s/p PEG  -Replete lytes PRN   -family wanted to see if pt could eat again, even for comfort   -primary means of feeding by peg, OK per S+S for comfort feeds    # mild steroid-induced hyperglycemia, resolved  -d/c FS 11/23    # DVT PPX: Lovenox held on 11/22, SCDs  # GI PPX: Pantoprazole peg  # Activity: can be OOB to chair w/ asst  Dispo: once stable, d/c home per family wishes, ?OP palliative if family agrees    D/W FAMILY - SON AND DAUGHTER IN LAW. Not ready to take patient home now.   Explained that palliative could be a wrong idea for this patient. Family tried feeding patient before coming to the hospital and patient aspirated.     PT / Rehab to d/c plan in 24 to 48 hours

## 2019-11-24 NOTE — PROGRESS NOTE ADULT - SUBJECTIVE AND OBJECTIVE BOX
SUBJECTIVE:    Patient is a 79y old Male who presents with a chief complaint of Sepsis (22 Nov 2019 12:18)    Currently admitted to medicine with the primary diagnosis of Sepsis     Today is hospital day 7d. Making noises, no acute distress. Off NC, on room air    INTERVAL EVENTS: NAEON     PAST MEDICAL & SURGICAL HISTORY  Emphysematous COPD  Status post insertion of percutaneous endoscopic gastrostomy (PEG) tube      ALLERGIES:  No Known Allergies    MEDICATIONS:  STANDING MEDICATIONS  influenza   Vaccine 0.5 milliLiter(s) IntraMuscular once  midodrine 5 milliGRAM(s) Oral every 8 hours  multivitamin 1 Tablet(s) Oral daily  pantoprazole   Suspension 40 milliGRAM(s) Enteral Tube daily    PRN MEDICATIONS    VITALS:   T(F): 97  HR: 104  BP: 104/55  RR: 18  SpO2: 97%    LABS:                        8.3    11.40 )-----------( 328      ( 23 Nov 2019 07:16 )             26.8     11-23    134<L>  |  97<L>  |  22<H>  ----------------------------<  107<H>  4.3   |  25  |  0.5<L>    Ca    8.0<L>      23 Nov 2019 07:16  Mg     2.0     11-23      PHYSICAL EXAM:  GEN: No acute distress, frail cachetic male  PULM/CHEST: Clear to auscultation bilaterally, no rales, rhonchi or wheezes   CVS: normal rate, regular rhythm, S1-S2, no murmurs  ABD: Soft, non-tender, non-distended, +BS, abd binders in place over PEG   EXT: No edema  NEURO: non-focal, [place]ian speaking

## 2019-11-25 NOTE — PROGRESS NOTE ADULT - SUBJECTIVE AND OBJECTIVE BOX
SUBJECTIVE:    Patient is a 79y old Male who presents with a chief complaint of Sepsis (24 Nov 2019 12:27)    Currently admitted to medicine with the primary diagnosis of Sepsis     Today is hospital day 9d. No somatic complaints     INTERVAL EVENTS: NAEON     PAST MEDICAL & SURGICAL HISTORY  Emphysematous COPD  Status post insertion of percutaneous endoscopic gastrostomy (PEG) tube      ALLERGIES:  No Known Allergies    MEDICATIONS:  STANDING MEDICATIONS  chlorhexidine 4% Liquid 1 Application(s) Topical <User Schedule>  influenza   Vaccine 0.5 milliLiter(s) IntraMuscular once  midodrine 5 milliGRAM(s) Oral every 8 hours  multivitamin 1 Tablet(s) Oral daily  pantoprazole   Suspension 40 milliGRAM(s) Enteral Tube daily    PRN MEDICATIONS    VITALS:   T(F): 97.5  HR: 99  BP: 106/58  RR: 18  SpO2: --    LABS:                        8.3    15.89 )-----------( 379      ( 25 Nov 2019 07:24 )             26.4     11-24    136  |  99  |  23<H>  ----------------------------<  125<H>  4.4   |  24  |  0.6<L>    Ca    8.1<L>      24 Nov 2019 07:37  Mg     2.1     11-24      PHYSICAL EXAM:  GEN: No acute distress, NC in place 3L, cachetic male in bed  PULM/CHEST: Clear to auscultation bilaterally, no rales, rhonchi or wheezes   CVS: normal rate, regular rhythm, S1-S2, no murmurs  ABD: Soft, non-tender, non-distended, +BS, PEG in place, abd binders   EXT: No edema  NEURO: AAOx2

## 2019-11-25 NOTE — PROGRESS NOTE ADULT - ASSESSMENT
79 year old male with PMHx of COPD, Interstitial lung disease, BPH, Dysphagia s/p PEG presenting due to fever and altered mental status, admitted for sepsis, clinically stable on antibiotics    1. Sepsis on admission due to LLL likely postobsturive pneumonia, now with new aspiration pneumona: Pt was initially treated with 8 days of IV cefepime (finished 11/23). Febrile again today with leukocytosis. Will restart IV cefepime and do septic workup. Reconsult ID. On admission, there was concern for LLL postobstructive pneumonia on CT 11/16, but family refused bronch due to high risk. Repeat CT on 11/20 showed improvement/rearation of LLL bronchus.  2. h/o COPD and chronic ILD: Stable  3. Hypotension: Midodrin 5mg q8 was started on this admission, bp is now stable  4. Anemia likely due to chronic disease: Monitor Hg  5. FTT, severe PCM s/p PEG: SS cleared pt for comfort feeds, but likely pt is aspirating    GI/DVT PPX    #Progress Note Handoff  Pending (specify): Resolution of sepsis  Family discussion: Not available  Disposition: TBD

## 2019-11-25 NOTE — PROGRESS NOTE ADULT - SUBJECTIVE AND OBJECTIVE BOX
KEN RAMIREZ  79y, Male  Allergy: No Known Allergies    Hospital Day: 9d    Patient seen and examined earlier today. Febrile today afternoon.    PMH/PSH:  PAST MEDICAL & SURGICAL HISTORY:  Emphysematous COPD  Status post insertion of percutaneous endoscopic gastrostomy (PEG) tube      VITALS:  T(F): 101 (11-25-19 @ 13:33), Max: 101 (11-25-19 @ 13:33)  HR: 140 (11-25-19 @ 13:33)  BP: 103/59 (11-25-19 @ 13:33) (98/52 - 106/58)  RR: 18 (11-25-19 @ 13:33)  SpO2: 75% (11-25-19 @ 10:03)    TESTS & MEASUREMENTS:  Weight (Kg):       11-23-19 @ 07:01  -  11-24-19 @ 07:00  --------------------------------------------------------  IN: 920 mL / OUT: 0 mL / NET: 920 mL    11-24-19 @ 07:01  -  11-25-19 @ 07:00  --------------------------------------------------------  IN: 460 mL / OUT: 0 mL / NET: 460 mL                            8.3    15.89 )-----------( 379      ( 25 Nov 2019 07:24 )             26.4       11-25    137  |  98  |  23<H>  ----------------------------<  93  5.0   |  26  |  0.6<L>    Ca    8.4<L>      25 Nov 2019 07:24  Mg     2.1     11-25    TPro  x   /  Alb  2.7<L>  /  TBili  x   /  DBili  x   /  AST  x   /  ALT  x   /  AlkPhos  x   11-25    LIVER FUNCTIONS - ( 25 Nov 2019 07:24 )  Alb: 2.7 g/dL / Pro: x     / ALK PHOS: x     / ALT: x     / AST: x     / GGT: x           RECENT DIAGNOSTIC ORDERS:  Complete Blood Count + Automated Diff: AM Sched. Collection: 26-Nov-2019 04:30 (11-25-19 @ 09:05)  Basic Metabolic Panel: AM Sched. Collection: 26-Nov-2019 04:30 (11-25-19 @ 09:05)  Magnesium, Serum: AM Sched. Collection: 26-Nov-2019 04:30 (11-25-19 @ 09:05)  Culture - Blood: 16:00  Specimen Source: Blood (11-25-19 @ 13:30)    MEDICATIONS:  MEDICATIONS  (STANDING):  chlorhexidine 4% Liquid 1 Application(s) Topical <User Schedule>  influenza   Vaccine 0.5 milliLiter(s) IntraMuscular once  midodrine 5 milliGRAM(s) Oral every 8 hours  multivitamin 1 Tablet(s) Oral daily  pantoprazole   Suspension 40 milliGRAM(s) Enteral Tube daily    MEDICATIONS  (PRN):  acetaminophen   Tablet .. 650 milliGRAM(s) Oral every 6 hours PRN Temp greater or equal to 38.5C (101.3F)  albuterol/ipratropium for Nebulization 3 milliLiter(s) Nebulizer every 6 hours PRN Shortness of Breath and/or Wheezing    HOME MEDICATIONS:  ipratropium-albuterol 0.5 mg-2.5 mg/3 mLinhalation solution (11-09)    REVIEW OF SYSTEMS:  All other review of systems is negative unless indicated above.     PHYSICAL EXAM:  GENERAL: NAD  HEENT: No Swelling  CHEST/LUNG: Bilateral rhonchi R>L  HEART: RRR, No murmurs  ABDOMEN: Soft, Bowel sounds present  EXTREMITIES:  No clubbing

## 2019-11-25 NOTE — CHART NOTE - NSCHARTNOTEFT_GEN_A_CORE
Called to bedside by nurse Camacho at 5:30 for increased work of breathing.   Went to bedside, patient was satting 95% on NC, tachypneic with mild use of accessory muscles. Systolic blood pressure 90's, .  No wheezing on exam, +diffuse rales.   Chart reviewed, discussed with senior Dr Panchal.    -Ordered stat ABG, Chest X-Ray.   -Changed abx to meropenem to broaden coverage.  Will sign out to night intern and continue to monitor closely.

## 2019-11-25 NOTE — PROGRESS NOTE ADULT - ASSESSMENT
79 year old male with PMHx of COPD, Interstitial lung disease, BPH, Dysphagia s/p PEG presenting due to fever and altered mental status, admitted for sepsis, clinically stable.     #Sepsis on admit secondary to LLL likely postobstructive pneumonia vs. chronic aspiration pneumonitis- interval worsening of pna   #COPD Exacerbation w/ acute hypoxic resp failure, stable  #chronic Intersitial lung disease, stable  -11/16: CTA : No evidence of central or segmental pulmonary emboli. Small volume pneumomediastinum. Apparent soft tissue density in the region of the left hilum with cutoff of the left lower lobe bronchus and patchy bilaterallower lobe  airspace opacities and few mildly enlarged mediastinal lymph nodes. This is new since July 2019 and an underlying neoplastic process with a post obstructive pneumonia cannot be ruled out. Further evaluation with bronchoscopy may be of use.  -Pulm rec: High risk for bronchoscopy without intubation; FAMILY REFUSED BRONCHOSCOPY  -continue w chest PT, Aggressive pulmonary toilet, incentive spirometry, HOB >45, Aspiration precautions  -ID is following: rec vanc and cefepime, vacn held for trough of 30.7 on 11/17, repeat level at 9, resumed vanc 500 gq12 given low body weight, d/c'ed as MRSA neg 11/20  -s/p 8 days of cefepime, infectious disease team rec 7 days but primary team thought considering that pt was needing O2, to finish a 10day course. new plan is to d/c today, so pt received a total of 8 days of cefepime  -CXR 11/22 stable basilar opacities  -CXR 11/23: stable, satting 97% on RA  -duo nebs q6 PRN, attempt to wean off O2 (to avoid side effects)  -11/16: Ucx x1  -11/16: Bld Cx x2 are neg  -11/20: CTA : No central pulmonary embolus. Bilateral lower lobe predominant consolidation left greater than right compatible with pneumonia with interval worsening demonstrated by new groundglass opacities in the right lower lobe. Recommend follow-up noncontrast CT chest in 8 weeks to demonstrate resolution. Reaeration of the left lower lobe bronchus. Near complete resolution of the pneumomediastinum.    # hypotension - s/p fluid bolus; prob 2/2 sepsis (? bleed - see below)  -c/w  midodrine 5mg peg q8    # normo anemia; drop could be secondary to fluid bolus  -T&S- order for 11/26 if pt still inpt  -keep hgb > 7  -if need to give tx, then do guaiac stool x1  -11/22: Hg 7, d/c lovenox, placed on SCDs,    # Failure to thrive with Severe Malnutrition - s/p PEG  -Replete lytes PRN   -family wanted to see if pt could eat again, even for comfort   -primary means of feeding by peg, OK per S+S for comfort feeds  -f/u add on albumin (placed 11/25)    # mild steroid-induced hyperglycemia, resolved  -d/c FS 11/23    # DVT PPX: Lovenox held on 11/22, SCDs  # GI PPX: Pantoprazole peg  # Activity:  OOB to chair w/ asst  Dispo: d/c home per family wishes, ?OP palliative if family agrees 79 year old male with PMHx of COPD, Interstitial lung disease, BPH, Dysphagia s/p PEG presenting due to fever and altered mental status, admitted for sepsis, clinically stable.     #Sepsis on admit secondary to LLL likely postobstructive pneumonia vs. chronic aspiration pneumonitis- interval worsening of pna   #COPD Exacerbation w/ acute hypoxic resp failure, stable  #chronic Intersitial lung disease, stable  -11/16: CTA : No evidence of central or segmental pulmonary emboli. Small volume pneumomediastinum. Apparent soft tissue density in the region of the left hilum with cutoff of the left lower lobe bronchus and patchy bilaterallower lobe  airspace opacities and few mildly enlarged mediastinal lymph nodes. This is new since July 2019 and an underlying neoplastic process with a post obstructive pneumonia cannot be ruled out. Further evaluation with bronchoscopy may be of use.  -Pulm rec: High risk for bronchoscopy without intubation; FAMILY REFUSED BRONCHOSCOPY  -continue w chest PT, Aggressive pulmonary toilet, incentive spirometry, HOB >45, Aspiration precautions  -ID is following: rec vanc and cefepime, vacn held for trough of 30.7 on 11/17, repeat level at 9, resumed vanc 500 gq12 given low body weight, d/c'ed as MRSA neg 11/20  -s/p 8 days of cefepime, infectious disease team rec 7 days but primary team thought considering that pt was needing O2, to finish a 10day course. new plan is to d/c today, so pt received a total of 8 days of cefepime  -CXR 11/22 stable basilar opacities  -CXR 11/23: stable, satting 97% on RA  -duo nebs q6 PRN, attempt to wean off O2 (to avoid side effects)  -11/16: Ucx x1  -11/16: Bld Cx x2 are neg  -11/20: CTA : No central pulmonary embolus. Bilateral lower lobe predominant consolidation left greater than right compatible with pneumonia with interval worsening demonstrated by new groundglass opacities in the right lower lobe. Recommend follow-up noncontrast CT chest in 8 weeks to demonstrate resolution. Reaeration of the left lower lobe bronchus. Near complete resolution of the pneumomediastinum.    # hypotension - s/p fluid bolus; prob 2/2 sepsis (? bleed - see below)  -c/w  midodrine 5mg peg q8    #hypocalcemia   -11/25: Ca2+ 8.4, albumin 2.7 -> corrected Ca2+ 9.4    # normo anemia; drop could be secondary to fluid bolus  -T&S- order for 11/26 if pt still inpt  -keep hgb > 7  -if need to give tx, then do guaiac stool x1  -11/22: Hg 7, d/c lovenox, placed on SCDs,    # Failure to thrive with Severe Malnutrition - s/p PEG  -Replete lytes PRN   -family wanted to see if pt could eat again, even for comfort   -primary means of feeding by peg, OK per S+S for comfort feeds    # mild steroid-induced hyperglycemia, resolved  -d/c FS 11/23    # DVT PPX: Lovenox held on 11/22, SCDs  # GI PPX: Pantoprazole peg  # Activity:  OOB to chair w/ asst  Dispo: d/c home per family wishes, ?OP palliative if family agrees 79 year old male with PMHx of COPD, Interstitial lung disease, BPH, Dysphagia s/p PEG presenting due to fever and altered mental status, admitted for sepsis, clinically stable.     #Sepsis on admit secondary to LLL likely postobstructive pneumonia vs. chronic aspiration pneumonitis-s/p 8 day course of cefepime  #COPD Exacerbation w/ acute hypoxic resp failure, stable  #chronic Intersitial lung disease, stable  -11/16: CTA : No evidence of central or segmental pulmonary emboli. Small volume pneumomediastinum. Apparent soft tissue density in the region of the left hilum with cutoff of the left lower lobe bronchus and patchy bilaterallower lobe  airspace opacities and few mildly enlarged mediastinal lymph nodes. This is new since July 2019 and an underlying neoplastic process with a post obstructive pneumonia cannot be ruled out. Further evaluation with bronchoscopy may be of use.  -Pulm rec: High risk for bronchoscopy without intubation; FAMILY REFUSED BRONCHOSCOPY  -continue w chest PT, Aggressive pulmonary toilet, incentive spirometry, HOB >45, Aspiration precautions  -ID is following: rec vanc and cefepime, vacn held for trough of 30.7 on 11/17, repeat level at 9, resumed vanc 500 gq12 given low body weight, d/c'ed as MRSA neg 11/20  -s/p 8 days of cefepime, infectious disease team rec 7 days but primary team thought considering that pt was needing O2, to finish a 10day course. pt received a total of 8 days of cefepime  -CXR 11/22 stable basilar opacities  -CXR 11/23: stable, satting 97% on RA  -duo nebs q6 PRN, attempt to wean off O2 (to avoid side effects)  -11/16: Ucx x1  -11/16: Bld Cx x2 are neg  -11/20: CTA : No central pulmonary embolus. Bilateral lower lobe predominant consolidation left greater than right compatible with pneumonia with interval worsening demonstrated by new groundglass opacities in the right lower lobe. Recommend follow-up noncontrast CT chest in 8 weeks to demonstrate resolution. Reaeration of the left lower lobe bronchus. Near complete resolution of the pneumomediastinum.  -11/23: CXR: Lung parenchyma/Pleura: Unchanged left lower lobe opacity. Additional scattered bilateral upper lobe bronchiectasis/fibrosis, peripheral based. No evidence of pneumothorax.      #leukocytosis  -new fever ~1300 today, pt has known intermittent tachycardia, blood culture for 1600     # hypotension - s/p fluid bolus; prob 2/2 sepsis, resolved  -c/w  midodrine 5mg peg q8    #hypocalcemia   -11/25: Ca2+ 8.4, albumin 2.7 -> corrected Ca2+ 9.4    # normo anemia; drop could be secondary to fluid bolus  -T&S- order for 11/26 if pt still inpt  -keep hgb > 7  -if need to give tx, then do guaiac stool x1  -11/22: Hg 7, d/c lovenox, placed on SCDs,    # Failure to thrive with Severe Malnutrition - s/p PEG  -Replete lytes PRN   -family wanted to see if pt could eat again, even for comfort   -primary means of feeding by peg, OK per S+S for comfort feeds    # mild steroid-induced hyperglycemia, resolved  -d/c FS 11/23    # DVT PPX: Lovenox held on 11/22, SCDs  # GI PPX: Pantoprazole peg  # Activity:  OOB to chair w/ asst  Dispo: fever/WBC wkup, d/c home per family wishes, ?OP palliative if family agrees 79 year old male with PMHx of COPD, Interstitial lung disease, BPH, Dysphagia s/p PEG presenting due to fever and altered mental status, admitted for sepsis, clinically stable.     #Sepsis on admit secondary to LLL likely postobstructive pneumonia vs. chronic aspiration pneumonitis-s/p 8 day course of cefepime  #COPD Exacerbation w/ acute hypoxic resp failure, stable  #chronic Intersitial lung disease, stable  -11/16: CTA : No evidence of central or segmental pulmonary emboli. Small volume pneumomediastinum. Apparent soft tissue density in the region of the left hilum with cutoff of the left lower lobe bronchus and patchy bilaterallower lobe  airspace opacities and few mildly enlarged mediastinal lymph nodes. This is new since July 2019 and an underlying neoplastic process with a post obstructive pneumonia cannot be ruled out. Further evaluation with bronchoscopy may be of use.  -Pulm rec: High risk for bronchoscopy without intubation; FAMILY REFUSED BRONCHOSCOPY  -continue w chest PT, Aggressive pulmonary toilet, incentive spirometry, HOB >45, Aspiration precautions  -ID is following: rec vanc and cefepime, vacn held for trough of 30.7 on 11/17, repeat level at 9, resumed vanc 500 gq12 given low body weight, d/c'ed as MRSA neg 11/20  -s/p 8 days of cefepime, infectious disease team rec 7 days but primary team thought considering that pt was needing O2, to finish a 10day course. pt received a total of 8 days of cefepime  -CXR 11/22 stable basilar opacities  -CXR 11/23: stable, satting 97% on RA  -duo nebs q6 PRN, attempt to wean off O2 (to avoid side effects)  -11/16: Ucx x1  -11/16: Bld Cx x2 are neg  -11/20: CTA : No central pulmonary embolus. Bilateral lower lobe predominant consolidation left greater than right compatible with pneumonia with interval worsening demonstrated by new groundglass opacities in the right lower lobe. Recommend follow-up noncontrast CT chest in 8 weeks to demonstrate resolution. Reaeration of the left lower lobe bronchus. Near complete resolution of the pneumomediastinum.  -11/23: CXR: Lung parenchyma/Pleura: Unchanged left lower lobe opacity. Additional scattered bilateral upper lobe bronchiectasis/fibrosis, peripheral based. No evidence of pneumothorax  -patient's O2 sat is 70s on room air at rest. Pt has COPD and ILD and therefore needs home oxygen. Despite IV abx and bronchodilators, pt remains hypoxic at rest on RA and requires home O2. Pt was tested in a chronic, stable state. Pt is aware that he will be going home with oxygen      #leukocytosis  -new fever ~1300 today, pt has known intermittent tachycardia, blood culture for 1600     # hypotension - s/p fluid bolus; prob 2/2 sepsis, resolved  -c/w  midodrine 5mg peg q8    #hypocalcemia   -11/25: Ca2+ 8.4, albumin 2.7 -> corrected Ca2+ 9.4    # normo anemia; drop could be secondary to fluid bolus  -T&S- order for 11/26 if pt still inpt  -keep hgb > 7  -if need to give tx, then do guaiac stool x1  -11/22: Hg 7, d/c lovenox, placed on SCDs,    # Failure to thrive with Severe Malnutrition - s/p PEG  -Replete lytes PRN   -family wanted to see if pt could eat again, even for comfort   -primary means of feeding by peg, OK per S+S for comfort feeds    # mild steroid-induced hyperglycemia, resolved  -d/c FS 11/23    # DVT PPX: Lovenox held on 11/22, SCDs  # GI PPX: Pantoprazole peg  # Activity:  OOB to chair w/ asst  Dispo: fever/WBC wkup, d/c home per family wishes, ?OP palliative if family agrees

## 2019-11-26 NOTE — GOALS OF CARE CONVERSATION - ADVANCED CARE PLANNING - CONVERSATION DETAILS
Patient is a 79 year old male with PMHx of COPD, Interstitial lung disease, BPH, Dysphagia s/p PEG  Pt was recently admitted 10/31-11/11 for sepsis secondary to pneumonia and failure to thrive. He had a PEG tube placed on 11/6 by Dr. Meredith () and was d/c'ed home 4 days ago. His family reported that pt was febrile when he was discharged and continued to be febrile. (though his last recorded temperature was 96.0) .  He was also noted to have productive cough. Patient is [place] speaking and family directs medical care.     Palliative care team met with the patients son and spouse at bedside. Patient son verbalized that he has received a medical update and has a clear understanding of patient's overall medical conditions and very poor prognosis. Son verbalized the desire to make patient comfortable; as such comfort measures introduced. Son relating that is in line with their wishes, however, they would like to wait until Friday morning to initiate. Son relating they would like the patient to continue ongoing medical management through the holiday. Plan to meet with son at bedside on Friday 11/29/19@10:30am.     Palliative care team will continue to monitor and provide support.

## 2019-11-26 NOTE — PHARMACOTHERAPY INTERVENTION NOTE - COMMENTS
mucinex er 600mg q12h. pt has a PEG tube. I recommended on profile review to change to guaifenesin liquid
tylenol tab--pt has a PEG tube. I recommended on profile review to change to tylenol liquid
vancomycin 1gm q12h     wt=39kg   I asked dr cordova to change to 500mg q12h

## 2019-11-26 NOTE — PROGRESS NOTE ADULT - SUBJECTIVE AND OBJECTIVE BOX
SUBJECTIVE:    Patient is a 79y old Male who presents with a chief complaint of Sepsis (25 Nov 2019 15:18)    Currently admitted to medicine with the primary diagnosis of Sepsis     Today is hospital day 10d. occasional grunting /noises. not in distress     INTERVAL EVENTS: fever to 101 1300 on 11/25, tachypnic o/n, repeat cxr increasing LLL infiltrate, on meropenem     PAST MEDICAL & SURGICAL HISTORY  Emphysematous COPD  Status post insertion of percutaneous endoscopic gastrostomy (PEG) tube      ALLERGIES:  No Known Allergies    MEDICATIONS:  STANDING MEDICATIONS  chlorhexidine 4% Liquid 1 Application(s) Topical <User Schedule>  guaiFENesin    Syrup 200 milliGRAM(s) Oral every 6 hours  heparin  Injectable 5000 Unit(s) SubCutaneous every 12 hours  influenza   Vaccine 0.5 milliLiter(s) IntraMuscular once  meropenem  IVPB 1000 milliGRAM(s) IV Intermittent every 8 hours  midodrine 5 milliGRAM(s) Oral every 8 hours  multivitamin 1 Tablet(s) Oral daily  pantoprazole   Suspension 40 milliGRAM(s) Enteral Tube daily    PRN MEDICATIONS  acetaminophen    Suspension .. 650 milliGRAM(s) Oral every 6 hours PRN  albuterol/ipratropium for Nebulization 3 milliLiter(s) Nebulizer every 6 hours PRN    VITALS:   T(F): 99.1  HR: 127  BP: 118/57  RR: 18  SpO2: 95%    LABS:                        8.1    29.53 )-----------( 396      ( 26 Nov 2019 06:43 )             26.2     11-26    142  |  99  |  33<H>  ----------------------------<  101<H>  4.6   |  25  |  0.7    Ca    8.5      26 Nov 2019 06:43  Mg     2.1     11-26    TPro  x   /  Alb  2.7<L>  /  TBili  x   /  DBili  x   /  AST  x   /  ALT  x   /  AlkPhos  x   11-25    ABG - ( 25 Nov 2019 17:34 )  pH, Arterial: 7.51  pH, Blood: x     /  pCO2: 35    /  pO2: 100   / HCO3: 28    / Base Excess: 4.9   /  SaO2: 99        Lactate, Blood: 1.9 mmol/L (11-26-19 @ 06:43)        PHYSICAL EXAM:  GEN: No acute distress, cachetic male  PULM/CHEST:, rhonchi, clear to auscultation    CVS: normal rate, regular rhythm, S1-S2, no murmurs  ABD: Soft, non-tender, non-distended, +BS, peg in place, abd binders over   EXT: No edema  NEURO: AAOx2

## 2019-11-26 NOTE — CONSULT NOTE ADULT - ASSESSMENT
Consult Summary  79 year old man with history of COPD, ILD, advanced dementia, dysphagia with PEG placement with recent hospitalization for sepsis/pneumonia in early November was admitted for fever and altered mental status and found to have sepsis/pneumonia. Patient's hospital course has been complicated by sepsis in the setting of possible post-obstructive pneumonia vs chronic aspiration, COPD exacerbation, recurrent fevers and leukocytosis despite treatment.  Per recent discussion with primary team, the family had refused bronchoscopy for additional workup and were considering comfort measures only.  Palliative care was consulted for [facility].    Met with patient's family (son and wife) with MARY Melendez. Please see Alivia's note for full details, but in brief, the patient's family was aware of the patient's poor prognosis per his previous discussions with the primary team and stated he was aware that he could die at any time.  The concept of comfort measure were introduced and the patient's family stated that it was in line with the wishes for the patient.  The patient's son noted a significant amount of family was coming in for the holidays, and he wished to continue medical management for now and likely initiate comfort measures on Friday 11/29.  At this time, the plan is for ongoing medical management with no escalation of care. They are aware he may die in the meantime. All questions answered.      30 minutes spent on advance care planning.      Morphine Equivalent Daily Dose (MEDD): 0    Recommendations:  -DNR/DNI per previous discussions between family and primary team  -ongoing medical management per primary team  -no escalation of care  -plan for initiation of comfort measures on 11/29 as above; will plan to meet with family again prior to initiation of comfort measures  -patient with some noted secretions and mild tachypnea noted on exam - agree with scopolamine patch;  -patient would also likely benefit from low dose morphine for dyspnea - 2mg IV q4h PRN for dyspnea  -Palliative care will continue to follow    Please Call [phone] PRN

## 2019-11-26 NOTE — PROGRESS NOTE ADULT - ASSESSMENT
79 year old male with PMHx of COPD, Interstitial lung disease, BPH, Dysphagia s/p PEG presenting due to fever and altered mental status, admitted for sepsis, now with fevers and elevated white count, on meropenem.     #Sepsis on admit secondary to LLL likely postobstructive pneumonia vs. chronic aspiration pneumonitis-s/p 8 day course of cefepime  #COPD Exacerbation w/ acute hypoxic resp failure, stable  #chronic Intersitial lung disease, stable  #increasing LLL infiltrate - with fever and elevated WBC- likely neoplastic component vs asp pneumonitis   -11/16: CTA : No evidence of central or segmental pulmonary emboli. Small volume pneumomediastinum. Apparent soft tissue density in the region of the left hilum with cutoff of the left lower lobe bronchus and patchy bilaterallower lobe  airspace opacities and few mildly enlarged mediastinal lymph nodes. This is new since July 2019 and an underlying neoplastic process with a post obstructive pneumonia cannot be ruled out. Further evaluation with bronchoscopy may be of use.  -Pulm rec: High risk for bronchoscopy without intubation; FAMILY REFUSED BRONCHOSCOPY  -continue w chest PT, Aggressive pulmonary toilet, incentive spirometry, HOB >45, Aspiration precautions  -ID is following: rec vanc and cefepime, vacn held for trough of 30.7 on 11/17, repeat level at 9, resumed vanc 500 gq12 given low body weight, d/c'ed as MRSA neg 11/20  -s/p 8 days of cefepime, infectious disease team rec 7 days but primary team thought considering that pt was needing O2, to finish a 10day course. pt received a total of 8 days of cefepime  -CXR 11/22 stable basilar opacities  -CXR 11/23: stable, satting 97% on RA  -duo nebs q6 PRN, mucinex   -11/16: Ucx NGTD  -11/16: Bld Cx x2 are neg  -11/20: CTA : No central pulmonary embolus. Bilateral lower lobe predominant consolidation left greater than right compatible with pneumonia with interval worsening demonstrated by new groundglass opacities in the right lower lobe. Recommend follow-up noncontrast CT chest in 8 weeks to demonstrate resolution. Reaeration of the left lower lobe bronchus. Near complete resolution of the pneumomediastinum.  -11/23: CXR: Lung parenchyma/Pleura: Unchanged left lower lobe opacity. Additional scattered bilateral upper lobe bronchiectasis/fibrosis, peripheral based. No evidence of pneumothorax  -patient's O2 sat is 70s on room air at rest. Pt has COPD and ILD and therefore needs home oxygen. Despite IV abx and bronchodilators, pt remains hypoxic at rest on RA and requires home O2. Pt was tested in a chronic, stable state. Pt is aware that he will be going home with oxygen  -f/u 11/25 Blood culture    #leukocytosis  -new fever ~1300 11/25, pt has known intermittent tachycardia  -f/u 11/25 Blood culture-collected, continue w meropenem   -11/25 CXR: increase LLL inflitrate  -11/25 EKG: sinus tachycardia, LVH (not new)    # hypotension - s/p fluid bolus; prob 2/2 sepsis, resolved  -c/w  midodrine 5mg peg q8    #hypocalcemia   -11/25: Ca2+ 8.4, albumin 2.7 -> corrected Ca2+ 9.4    # normo anemia; drop could be secondary to fluid bolus  -T&S- ordered for 11/26   -keep hgb > 7  -if need to give tx, then do guaiac stool x1  -11/22: Hg 7, d/c lovenox, placed on SCDs,    # Failure to thrive with Severe Malnutrition - s/p PEG  -Replete lytes PRN   -family wanted to see if pt could eat again, even for comfort   -primary means of feeding by peg, OK per S+S for comfort feeds    # mild steroid-induced hyperglycemia, resolved  -d/c FS 11/23    # DVT PPX: Lovenox held on 11/22, SCDs  # GI PPX: Pantoprazole peg  # Activity:  OOB to chair w/ asst  Dispo: fever/WBC wkup, d/c home per family wishes, ?OP palliative if family agrees

## 2019-11-26 NOTE — CONSULT NOTE ADULT - SUBJECTIVE AND OBJECTIVE BOX
REQUESTED OF: DR ZAZUETA    Chart reviewed, Hospital Day 10    KEN RAMIREZ 79yMale  HPI:  79 year old male with PMHx of COPD, Interstitial lung disease, BPH, Dysphagia s/p PEG  Pt was recently admitted 10/31-11/11 for sepsis secondary to pneumonia and failure to thrive. He had a PEG tube placed on 11/6 by Dr. Meredith (GI) and was d/c'ed home 4 days ago. His family reported that pt was febrile when he was discharged and continued to be febrile. (though his last recorded temperature was 96.0) .  He was also noted to have productive cough. He is unable to provide history at this time, contact family in AM. In the ED per sepsis protocol,  2.5L LR, Cefepime, and Levaquin for were given for presumed HCAP.  WBC 22, he was tachycardic to the 120s,  so CTA chest/abd was ordered to r/o PE and was negative. (16 Nov 2019 03:59)        PAST MEDICAL & SURGICAL HISTORY:  Emphysematous COPD  Status post insertion of percutaneous endoscopic gastrostomy (PEG) tube      Subjective and Objective:  Met with patient, son, and spouse at bedside today.  Patient did not respond or open eyes to vocal stimulus.  He's noted to have significant secretions on exam with some mild respiratory distress    Focused Palliative Care Evaluation:                   Symptoms:                                      Pain -                                     Dyspnea +secretions with some mild tachypnea                                     N/V -                                     Appetite -                                      Anxiety -                                     Other _____________________                     Support Devices: O2 via nasal cannula             PHYSICAL EXAM:      Constitutional:  mild tachypnea, not opening eyes to stimulus, no significant distress, cachectic  Eyes: eyes closed  ENMT: +secretions, MMM  Respiratory: +secretions, rhonchi, nasal cannulain place  Cardiovascular: tachycardia, no edema  Gastrointestinal:  S/ND; PEG  Neurological: patient non participatory  Skin:  no rashes noted  Psych: unable to determine due to neurological exam as above        T(C): 37.2, Max: 38 (16:01)  HR: 105 (105 - 127)  BP: 153/78 (107/56 - 153/78)  RR: 18 (18 - 18)  SpO2: 96% (95% - 97%)      LABS/STUDIES:  11-26    142  |  99  |  33<H>  ----------------------------<  101<H>  4.6   |  25  |  0.7    Ca    8.5      26 Nov 2019 06:43  Mg     2.1     11-26    TPro  x   /  Alb  2.7<L>  /  TBili  x   /  DBili  x   /  AST  x   /  ALT  x   /  AlkPhos  x   11-25                            8.1    29.53 )-----------( 396      ( 26 Nov 2019 06:43 )             26.2       MEDICATIONS  (STANDING):  albuterol/ipratropium for Nebulization 3 milliLiter(s) Nebulizer every 6 hours  chlorhexidine 4% Liquid 1 Application(s) Topical <User Schedule>  guaiFENesin    Syrup 200 milliGRAM(s) Oral every 6 hours  heparin  Injectable 5000 Unit(s) SubCutaneous every 12 hours  influenza   Vaccine 0.5 milliLiter(s) IntraMuscular once  meropenem  IVPB 1000 milliGRAM(s) IV Intermittent every 8 hours  methylPREDNISolone sodium succinate Injectable 40 milliGRAM(s) IV Push every 8 hours  midodrine 5 milliGRAM(s) Oral every 8 hours  multivitamin 1 Tablet(s) Oral daily  pantoprazole   Suspension 40 milliGRAM(s) Enteral Tube daily  scopolamine   Patch 1 Patch Transdermal every 72 hours  senna 2 Tablet(s) Oral at bedtime    MEDICATIONS  (PRN):  acetaminophen    Suspension .. 650 milliGRAM(s) Oral every 6 hours PRN Mild Pain (1 - 3)  morphine  - Injectable 3 milliGRAM(s) IV Push every 1 hour PRN Severe Pain (7 - 10)    iStop:  Reference #: [id number] - no recent meds    PPS  Level    30%       Note PPS = Palliative Performance Scale; (c)2001, [facility]       Range from 100% meaning Full ambulation/self-care/intake/Level of Consicous                                                                              to        10% meaning Bedbound/Unable to do any activity/extensive disease /Total Care/ No PO intake/ LOC=Full/drowsy/+/-confusion        (0% = death)                     Prior to acute illness, patient's functionality reportedly declining with multiple hospitalizations over the last few months

## 2019-11-27 NOTE — DISCHARGE NOTE PROVIDER - NSDCMRMEDTOKEN_GEN_ALL_CORE_FT
ipratropium-albuterol 0.5 mg-2.5 mg/3 mLinhalation solution: 3 milliliter(s) inhaled every 6 hours, As needed, Shortness of Breath and/or Wheezing

## 2019-11-27 NOTE — PROGRESS NOTE ADULT - SUBJECTIVE AND OBJECTIVE BOX
Brief HPI  79 year old man with history of COPD, ILD, advanced dementia, dysphagia with PEG placement with recent hospitalization for sepsis/pneumonia in early November was admitted for fever and altered mental status and found to have sepsis/pneumonia. Patient's hospital course has been complicated by sepsis in the setting of possible post-obstructive pneumonia vs chronic aspiration, COPD exacerbation, recurrent fevers and leukocytosis despite treatment.  Per recent discussion with primary team, the family had refused bronchoscopy for additional workup and were considering comfort measures only.  Palliative care was consulted for GOC.    Interval History  -Patient seen at bedside  -Family states he was awake most of the night and day, and that he's less drowsy today  -Per discussion with family at bedside, plan for family meeting at 1030am on 11/29  to discuss comfort measures.  Family wants no change in current medical management until then.  -Family agreeable to hospice consult      PHYSICAL EXAM    T(C): , Max: 36.4 (14:30)  T(F): 97.5  HR: 131 (120 - 131)  BP: 119/67 (72/45 - 135/65)  RR: 20 (18 - 95)  SpO2: 93% (93% - 96%)    Constitutional:  patient talking, but not responding to words/questions  Eyes: eyes open, no scleral icterus  ENMT: +secretions, MMM  Respiratory: +secretions, rhonchi, nasal cannula in place  Cardiovascular: tachycardia, no edema  Gastrointestinal:  S/ND; PEG  Neurological: patient non participatory in exam, but no focal deficits  Skin:  no rashes noted  Psych: unable to determine due to neurological exam as above          LABS:                          8.8    37.57 )-----------( 413      ( 27 Nov 2019 06:38 )             28.8                                                                                      11-27    146  |  102  |  35<H>  ----------------------------<  132<H>  4.3   |  25  |  0.7    Ca    9.1      27 Nov 2019 06:38  Mg     2.2     11-27                                                        MEDICATIONS  (STANDING):  albuterol/ipratropium for Nebulization 3 milliLiter(s) Nebulizer every 6 hours  guaiFENesin    Syrup 200 milliGRAM(s) Oral every 6 hours  heparin  Injectable 5000 Unit(s) SubCutaneous every 12 hours  influenza   Vaccine 0.5 milliLiter(s) IntraMuscular once  meropenem  IVPB 500 milliGRAM(s) IV Intermittent every 8 hours  methylPREDNISolone sodium succinate Injectable 40 milliGRAM(s) IV Push every 8 hours  midodrine 5 milliGRAM(s) Oral every 8 hours  multivitamin 1 Tablet(s) Oral daily  pantoprazole   Suspension 40 milliGRAM(s) Enteral Tube daily  scopolamine   Patch 1 Patch Transdermal every 72 hours  senna 2 Tablet(s) Oral at bedtime    MEDICATIONS  (PRN):  acetaminophen    Suspension .. 650 milliGRAM(s) Oral every 6 hours PRN Mild Pain (1 - 3)  morphine  - Injectable 2 milliGRAM(s) IV Push every 4 hours PRN Severe Pain (7 - 10)

## 2019-11-27 NOTE — DISCHARGE NOTE PROVIDER - NSDCCPCAREPLAN_GEN_ALL_CORE_FT
PRINCIPAL DISCHARGE DIAGNOSIS  Diagnosis: Sepsis  Assessment and Plan of Treatment: you had an infection in your lung that was treated with antibiotics. However, there was a possible cancer in the left lung- unable to asses as we did not perform bronchoscopy. You continued to have high white count and fever despite antibiotic treatment, this is likely due to chronic aspiration and the mass that is present in the L lung.

## 2019-11-27 NOTE — PROGRESS NOTE ADULT - SUBJECTIVE AND OBJECTIVE BOX
KEN RAMIREZ  79y, Male    All available historical data reviewed    OVERNIGHT EVENTS:  low grade fevers, non communicative, appears terminal    ROS:  not obtainable    VITALS:  T(F): 97.3, Max: 100.4 (11-26-19 @ 16:01)  HR: 121  BP: 112/61  RR: 18Vital Signs Last 24 Hrs  T(C): 36.3 (27 Nov 2019 11:00), Max: 38 (26 Nov 2019 16:01)  T(F): 97.3 (27 Nov 2019 11:00), Max: 100.4 (26 Nov 2019 16:01)  HR: 121 (27 Nov 2019 11:00) (105 - 129)  BP: 112/61 (27 Nov 2019 11:00) (72/45 - 153/78)  BP(mean): --  RR: 18 (27 Nov 2019 11:00) (18 - 95)  SpO2: 93% (27 Nov 2019 10:39) (93% - 97%)    TESTS & MEASUREMENTS:                        8.8    37.57 )-----------( 413      ( 27 Nov 2019 06:38 )             28.8     11-27    146  |  102  |  35<H>  ----------------------------<  132<H>  4.3   |  25  |  0.7    Ca    9.1      27 Nov 2019 06:38  Mg     2.2     11-27          Culture - Blood (collected 11-25-19 @ 19:07)  Source: .Blood None  Preliminary Report (11-27-19 @ 02:12):    No growth to date.            RADIOLOGY & ADDITIONAL TESTS:  Personal review of radiological diagnostics performed  Echo and EKG results noted when applicable.     ANTIBIOTICS:  meropenem  IVPB 1000 milliGRAM(s) IV Intermittent every 8 hours

## 2019-11-27 NOTE — PROGRESS NOTE ADULT - GASTROINTESTINAL
Soft, non-tender, no hepatosplenomegaly, normal bowel sounds
detailed exam

## 2019-11-27 NOTE — DISCHARGE NOTE PROVIDER - HOSPITAL COURSE
79 year old male with PMHx of COPD, Interstitial lung disease, BPH, Dysphagia s/p PEG presenting due to fever and altered mental status, admitted for sepsis secondary to LLL likely postobstructive pneumonia vs. chronic aspiration pneumonitis-blood and urine cultures were negative, pt completed course of cefepime. Pt was found to have an apparent soft tissue density in the region of the left hilum with cutoff of the left lower lobe bronchus and patchy bilateral lower lobe  airspace opacities and few mildly enlarged mediastinal lymph nodes- new since July 2019 and an underlying neoplastic process with a post obstructive pneumonia cannot be ruled out. bronchoscopy was recommended but pt's family refused wkup. Hospital course was complicated by worsening leukocytosis with worsening LLL infiltrate s/p course of cefepime, pt was placed on meorpenem, repeat  blood culture was negative. Decision was to make pt comfort care.         Imagining during hospital course:     -11/16: CTA : No evidence of central or segmental pulmonary emboli. Small volume pneumomediastinum. Apparent soft tissue density in the region of the left hilum with cutoff of the left lower lobe bronchus and patchy bilaterallower lobe  airspace opacities and few mildly enlarged mediastinal lymph nodes. This is new since July 2019 and an underlying neoplastic process with a post obstructive pneumonia cannot be ruled out. Further evaluation with bronchoscopy may be of use.    -CXR 11/22 stable basilar opacities    -CXR 11/23: stable    -11/20: CTA : No central pulmonary embolus. Bilateral lower lobe predominant consolidation left greater than right compatible with pneumonia with interval worsening demonstrated by new groundglass opacities in the right lower lobe. Recommend follow-up noncontrast CT chest in 8 weeks to demonstrate resolution. Reaeration of the left lower lobe bronchus. Near complete resolution of the pneumomediastinum.    -11/23: CXR: Lung parenchyma/Pleura: Unchanged left lower lobe opacity. Additional scattered bilateral upper lobe bronchiectasis/fibrosis, peripheral based. No evidence of pneumothorax

## 2019-11-27 NOTE — PROGRESS NOTE ADULT - CARDIOVASCULAR
Jayleen Pedersen is a 4 year old female presenting with sore throat, nausea,fever,   Symptoms started last night   OTC medications used: n/a  Denies  known Latex allergy or symptoms of Latex sensitivity.  History   Smoking Status   • Never Smoker   Smokeless Tobacco   • Never Used     Comment: no smoking in the house, grandma smokes outside.     All allergies and medications reviewed.  PCP verified:  Dr. Krishna Corea  Pharmacy verified:  [id number] - [place], [place] - [address]  
Regular rate & rhythm, normal S1, S2; no murmurs, gallops or rubs; no S3, S4
detailed exam
Regular rate & rhythm, normal S1, S2; no murmurs, gallops or rubs; no S3, S4
Regular rate & rhythm, normal S1, S2; no murmurs, gallops or rubs; no S3, S4
detailed exam

## 2019-11-27 NOTE — PROGRESS NOTE ADULT - ATTENDING COMMENTS
I saw and evaluated patient  by bedside, remains confused at baseline, now moaning, has moderate b/l rhonchi on exam. tolerating peg feedings as per covering nurse report. patient is very cachectic.   All labs, radiology studies, VS was reviewed  I have reviewed the resident's note and agree with documented findings and  plan of care.  #Sepsis due to left lung post obstructive  pneumonia, with suspected gram negative rods PNA,  completed IV cefepime tx.   # Acute hypoxic respiratory failure with COPD exacerbation-still with moderate rhonchi and wheezing, as per nursing report, patient has difficulty clearing up sputum, needs frequent suctioning- add on scopolamine patch to decrease oral secretions, will start Solumedrol tx. 40 mg IV every 8 hours, Duoneb every 6 hours. Since this is a recurrent event, patient has very poor overall prognosis, and good candidate for palliative care evaluation.- will place consult for Palliative care eval today.  #Worsening leukocytosis- ? patient just completed iv cefepime course tx., reconsult ID for further iv abx  recommendations. Blood cxs was repeated on 11/25, will repeat u/a with micro and urine cxs today.  #Patient was moaning today, since patient has dementia unable to determine the pain site, will tx. with pain management prn. and monitor patient clinically   # Chronic dementia, peg status- on peg feedings.  #Severe Protein calorie malnutrition- continue peg feedings tx.  daily DVT proph.  #Progress Note Handoff: Patient has very poor overall prognosis, consulted palliative team, f/up family for further decision, reconsulted ID with worsening leukocytosis  Family discussion: family by bedside Disposition: to be determined  Patient's Code status: DNR/DNI
patient seen and examined independently on morning rounds, chart reviewed and discussed with the medicine resident and on interdisciplinary rounds and agree with the above resident progress note with the following addendum:    no overnight events---CTA chest negative for PE and no longer hypoxic- awaiting dispo home with services (likely in am after finish 7 day course iv abx--cefepime)    --abdominal binder in place and peg still functioning well- patient is npo- concern for aspiration risk--patient with severe protein calorie malnutrition  -family refusing bronch due to risk including need for likely intubation  -continue conservative managment--would consider palliative care eval/hospice    DNR/DNI  poor prognosis     dispo will likely plan on discharge home within next 24-48 hrs when medically stable (family involved) once complete iv abx and if clinically improving- high risk for readmission-
patient seen and examined independently on morning rounds for the first time today, chart reviewed and discussed with the medicine resident and on interdisciplinary rounds and agree with the above resident progress note with the following addendum:    overnight episode of hypoxia when oob and in chair- desat down to the 80's on o2---now o2 sat improved (91% on 2L NC)-----remained tachycardic and due to hypoxia decision made to r/o PE with CTA chest--was on lovenox 40 mg sq daily---CTA chest negative for PE  --abdominal binder placed--peg still functioning well- patient is npo- concern for aspiration risk  -family refusing bronch due to risk including need for likely intubation  -continue conservative managment--would consider palliative care eval/hospice  -continue with iv cefepime----dc vanco (MRSA negative)  -f/u with ID    DNR/DNI  poor prognosis     dispo will likely plan on discharge home when medically stable (family involved)--f/u with PT eval
patient seen and examined independently on morning rounds, chart reviewed and discussed with the medicine resident and on interdisciplinary rounds and agree with the above resident progress note with the following addendum:    no overnight events---CTA chest negative for PE ---back on o2 suppl via nc- intermittently tachycardic (hr 110-120)-- awaiting dispo home with services and may need home o2 arranged for chronic ILD  -cont with iv cefepime (today day #8)--will cont 10 day course (monitor wbc and fever curve)--repeat cxr with some improvement    --abdominal binder in place and peg still functioning well- patient is npo- concern for aspiration risk--patient with severe protein calorie malnutrition  -family refusing bronch due to risk including need for likely intubation  -continue conservative managment--would consider palliative care eval/hospice once family ready    DNR/DNI  overall poor prognosis     dispo
#Progress Note Handoff:  Pending (specify):  Consults_________, Tests________, Test Results_______, Other____goc_____  Family discussion:d/w wife at bedside re: treatment plan, primary dx  Disposition: Home___/SNF___/Other________/Unknown at this time____x____
patient seen and examined independently on morning rounds for the first time today, chart reviewed and discussed with the medicine resident and on interdisciplinary rounds and agree with the above resident progress note with the following addendum:    overnight patient was pulling on PEG tube--abdominal binder placed--still functioning well  family refusing bronch due to risk associated including need for intubation  continue conservative managment--would consider palliative care eval/hospice  continue with iv vanco/cefepime--f/u with ID    DNR/DNI  poor prognosis
#Sepsis, present on admission, now resolved, due to pneumonia  CT concerning for LLL bronchus obstruction and pneumonia  vanco, cefepime per id  discussed with son at bedside: he does not want bronch  appreciate pulm input  bcx ntd, check sputum cx if possible    #Progress Note Handoff:  Pending (specify):  Consults_________, Tests________, Test Results_______, Other______pneumonia___  Family discussion:d/w son at bedside re: treatment plan, primary dx  Disposition: Home___/SNF___/Other________/Unknown at this time_____x___
Patient seen and examined independently. Agree with resident note/ history / physical exam and plan of care with following exceptions/additions/updates. Case discussed with house-staff, nursing and patient.     #Progress Note Handoff  Pending (specify):  poss dc on monday   Family discussion: dw pt , unable to communicate, left msg for family , PER CHART: PT IS DNR AND POSS HOSPICE VS PALLIATIVE CARE AT HOME IS TO BE SET UP   Disposition: Home_

## 2019-11-27 NOTE — PROGRESS NOTE ADULT - ASSESSMENT
· Assessment		  79y old Male with a past medical history of COPD (quit smoking at 58 yrs, not on home O2), ILD, BPH and dysphagia (patient has swallowing defect as evaluated on last admission, requiring PEG insertion for adequate nutrition). Patient is presenting for fever and SOB. CT angio chest showed LLL opacities, enlarged mediastinal lymph nodes and possible post-obstructive pneumnia.   < from: CT Angio Chest w/ IV Cont (11.20.19 @ 12:54) >  No central pulmonaryembolus.  Bilateral lower lobe predominant consolidation left greater than right   compatible with pneumonia with interval worsening demonstrated by new   groundglass opacities in the right lower lobe. Recommend follow-up   noncontrast CT chest in 8 weeks to demonstrate resolution.  Reaeration of the left lower lobe bronchus.  Near complete resolution of the pneumomediastinum.        IMPRESSION:  Bibasilar GNR/ORSA PNA  -CT chest 11/20 as above  -aCXR 11/26 noted  -chronic ILD  -legionella NG  -BCx NG 11/25  -nares ORSA NG  -WBC 37.9  Pt is terminal  As per family no escalation of care    RECOMMENDATIONS:  meropenem 500 mg iv q8h till am  recall prn please

## 2019-11-27 NOTE — PROGRESS NOTE ADULT - ASSESSMENT
Discharge Planning Assessment  [facility]     Patient Name: Magdalena Dickey  MRN: [id number]  Today's Date: 1/17/2018    Admit Date: 1/16/2018          Discharge Needs Assessment       01/17/18 1027    Living Environment    Lives With spouse    Living Arrangements house    Home Accessibility stairs within home;bed and bath on same level    Transportation Available family or friend will provide;car    Living Environment    Quality Of Family Relationships supportive    Able to Return to Prior Living Arrangements yes    Discharge Needs Assessment    Equipment Currently Used at Home none            Discharge Plan       01/17/18 1028    Case Management/Social Work Plan    Plan follow for patient's treatment progress. (currently on vent)     Patient/Family In Agreement With Plan yes    Additional Comments CCP met with patient at bedside. CCP role explained and discharge planning discussed. Face sheet verified. Patient lives with her spouse, in a house and has steps within the home. Patient has not used home health or been to SNF. Patient is independent with her ADLs and does not use medical equipment. CCP will continue to follow for patient's treatment course. Cat Sivla W           Discharge Placement     No information found                Demographic Summary       01/17/18 1026    Referral Information    Admission Type inpatient    Arrived From admitted as an inpatient    Referral Source admission list    Reason For Consult discharge planning    Primary Care Physician Information    Name Fermín Enoch             Functional Status       01/17/18 1026    Functional Status Current    Ambulation 4-->completely dependent    Transferring 4-->completely dependent    Toileting 4-->completely dependent    Bathing 4-->completely dependent    Dressing 4-->completely dependent    Eating 4-->completely dependent    Communication 2-->difficulty speaking (not related to language barrier)    Functional Status Prior     Ambulation 0-->independent    Transferring 0-->independent    Toileting 0-->independent    Bathing 0-->independent    Dressing 0-->independent    Eating 0-->independent    Communication 0-->understands/communicates without difficulty    IADL    Medications independent    Meal Preparation independent    Housekeeping independent    Laundry independent    Shopping independent    Oral Care independent    Activity Tolerance    Current Activity Limitations none    Employment/Financial    Financial Concerns none            Psychosocial     None            Abuse/Neglect     None            Legal     None            Substance Abuse     None            Patient Forms     None          ZEENAT Hummel     79 year old male with PMHx of COPD, Interstitial lung disease, BPH, Dysphagia s/p PEG presenting due to fever and altered mental status, admitted for sepsis, now with fevers and elevated white count, on meropenem.     #Sepsis on admit secondary to LLL likely postobstructive pneumonia vs. chronic aspiration pneumonitis-s/p 8 day course of cefepime  #COPD Exacerbation w/ acute hypoxic resp failure, stable  #chronic Intersitial lung disease, stable  #increasing LLL infiltrate - with fever and elevated WBC- likely neoplastic component vs asp pneumonitis   -11/16: CTA : No evidence of central or segmental pulmonary emboli. Small volume pneumomediastinum. Apparent soft tissue density in the region of the left hilum with cutoff of the left lower lobe bronchus and patchy bilaterallower lobe  airspace opacities and few mildly enlarged mediastinal lymph nodes. This is new since July 2019 and an underlying neoplastic process with a post obstructive pneumonia cannot be ruled out. Further evaluation with bronchoscopy may be of use.  -Pulm rec: High risk for bronchoscopy without intubation; FAMILY REFUSED BRONCHOSCOPY  -continue w chest PT, HOB >45, Aspiration precautions  -ID is following: rec vanc and cefepime, vacn held for trough of 30.7 on 11/17, repeat level at 9, resumed vanc 500 gq12 given low body weight, d/c'ed as MRSA neg 11/20  -s/p 8 days of cefepime, infectious disease team rec 7 days but primary team thought considering that pt was needing O2, to finish a 10day course. pt received a total of 8 days of cefepime  -CXR 11/22 stable basilar opacities  -CXR 11/23: stable, satting 97% on RA  -duo nebs q6 PRN, mucinex , scopolamine patch for secretions, 2mg IV morphine PRN q4hr   -11/16: Ucx NGTD  -11/16: Bld Cx x2 are neg  -11/20: CTA : No central pulmonary embolus. Bilateral lower lobe predominant consolidation left greater than right compatible with pneumonia with interval worsening demonstrated by new groundglass opacities in the right lower lobe. Recommend follow-up noncontrast CT chest in 8 weeks to demonstrate resolution. Reaeration of the left lower lobe bronchus. Near complete resolution of the pneumomediastinum.  -11/23: CXR: Lung parenchyma/Pleura: Unchanged left lower lobe opacity. Additional scattered bilateral upper lobe bronchiectasis/fibrosis, peripheral based. No evidence of pneumothorax  -patient's O2 sat is 70s on room air at rest. Pt has COPD and ILD and therefore needs home oxygen. Despite IV abx and bronchodilators, pt remains hypoxic at rest on RA and requires home O2. Pt was tested in a chronic, stable state. Pt is aware that he will be going home with oxygen  -f/u 11/25 Blood culture-NGTD    #leukocytosis, worsening  -11/25 Blood culture NGTD, continue w meropenem, family plan for comfort measures on Fri 11/29   -11/25 CXR: increase LLL inflitrate  -11/25 EKG: sinus tachycardia, LVH (not new)  -duo nebs q6 PRN, mucinex , scopolamine patch for secretions, 2mg IV morphine PRN q4hr     # hypotension - s/p fluid bolus; prob 2/2 sepsis, resolved  -c/w  midodrine 5mg peg q8    #hypocalcemia   -11/25: Ca2+ 8.4, albumin 2.7 -> corrected Ca2+ 9.4    # normo anemia; drop could be secondary to fluid bolus  -wont order type and screen for 11/28, as possible T&S, Hg stable   -keep hgb > 7  -if need to give tx, then do guaiac stool x1  -11/22: Hg 7, d/c lovenox, placed on SCDs,    # Failure to thrive with Severe Malnutrition - s/p PEG  -Replete lytes PRN   -family wanted to see if pt could eat again, even for comfort   -primary means of feeding by peg, OK per S+S for comfort feeds    # mild steroid-induced hyperglycemia, resolved  -d/c FS 11/23    # DVT PPX: Lovenox held on 11/22, SCDs  # GI PPX: Pantoprazole peg  # Activity:  OOB to chair w/ asst  DNR DNI   Dispo: medical mgmt until comfort measures on Fri, no labs as wont change medical mgmt 79 year old male with PMHx of COPD, Interstitial lung disease, BPH, Dysphagia s/p PEG presenting due to fever and altered mental status, admitted for sepsis, now with fevers and elevated white count, on meropenem.     #Sepsis on admit secondary to LLL likely postobstructive pneumonia vs. chronic aspiration pneumonitis-s/p 8 day course of cefepime  #COPD Exacerbation w/ acute hypoxic resp failure, stable  #chronic Intersitial lung disease, stable  #increasing LLL infiltrate - with fever and elevated WBC- likely neoplastic component vs asp pneumonitis   -11/16: CTA : No evidence of central or segmental pulmonary emboli. Small volume pneumomediastinum. Apparent soft tissue density in the region of the left hilum with cutoff of the left lower lobe bronchus and patchy bilaterallower lobe  airspace opacities and few mildly enlarged mediastinal lymph nodes. This is new since July 2019 and an underlying neoplastic process with a post obstructive pneumonia cannot be ruled out. Further evaluation with bronchoscopy may be of use.  -Pulm rec: High risk for bronchoscopy without intubation; FAMILY REFUSED BRONCHOSCOPY  -continue w chest PT, HOB >45, Aspiration precautions  -ID is following: rec vanc and cefepime, vacn held for trough of 30.7 on 11/17, repeat level at 9, resumed vanc 500 gq12 given low body weight, d/c'ed as MRSA neg 11/20  -s/p 8 days of cefepime, infectious disease team rec 7 days but primary team thought considering that pt was needing O2, to finish a 10day course. pt received a total of 8 days of cefepime  -CXR 11/22 stable basilar opacities  -CXR 11/23: stable, satting 97% on RA  -duo nebs q6 PRN, mucinex , scopolamine patch for secretions, 2mg IV morphine PRN q4hr   -11/16: Ucx NGTD  -11/16: Bld Cx x2 are neg  -11/20: CTA : No central pulmonary embolus. Bilateral lower lobe predominant consolidation left greater than right compatible with pneumonia with interval worsening demonstrated by new groundglass opacities in the right lower lobe. Recommend follow-up noncontrast CT chest in 8 weeks to demonstrate resolution. Reaeration of the left lower lobe bronchus. Near complete resolution of the pneumomediastinum.  -11/23: CXR: Lung parenchyma/Pleura: Unchanged left lower lobe opacity. Additional scattered bilateral upper lobe bronchiectasis/fibrosis, peripheral based. No evidence of pneumothorax  -patient's O2 sat is 70s on room air at rest. Pt has COPD and ILD and therefore needs home oxygen. Despite IV abx and bronchodilators, pt remains hypoxic at rest on RA and requires home O2. Pt was tested in a chronic, stable state. Pt is aware that he will be going home with oxygen  -f/u 11/25 Blood culture-NGTD    #leukocytosis, worsening  -11/25 Blood culture NGTD, continue w meropenem, family plan for comfort measures on Fri 11/29   -11/25 CXR: increase LLL inflitrate  -11/25 EKG: sinus tachycardia, LVH (not new)  -duo nebs q6 PRN, mucinex , scopolamine patch for secretions, 2mg IV morphine PRN q4hr     # hypotension - s/p fluid bolus; prob 2/2 sepsis, resolved  -c/w  midodrine 5mg peg q8    #hypocalcemia   -11/25: Ca2+ 8.4, albumin 2.7 -> corrected Ca2+ 9.4    # normo anemia; drop could be secondary to fluid bolus  -wont order type and screen for 11/28, as possible T&S, Hg stable   -keep hgb > 7  -if need to give tx, then do guaiac stool x1  -11/22: Hg 7, d/c lovenox, placed on SCDs,    # Failure to thrive with Severe Malnutrition - s/p PEG  -Replete lytes PRN   -family wanted to see if pt could eat again, even for comfort   -primary means of feeding by peg, OK per S+S for comfort feeds    # mild steroid-induced hyperglycemia, resolved  -d/c FS 11/23    # DVT PPX: Lovenox held on 11/22, SCDs  # GI PPX: Pantoprazole peg  # Activity:  OOB to chair w/ asst  DNR DNI   Dispo: medical mgmt until comfort measures on Fri, no labs as wont change medical mgmt, 1030am friday meeting with Palliative and family to possibly start comfort care, palliative doc aware that pt has no insurance

## 2019-11-27 NOTE — PROGRESS NOTE ADULT - SUBJECTIVE AND OBJECTIVE BOX
SUBJECTIVE:    Patient is a 79y old Male who presents with a chief complaint of Sepsis (26 Nov 2019 18:02)    Currently admitted to medicine with the primary diagnosis of Sepsis     Today is hospital day 11d. Resting in bed, w occasional grunting, mildly distressed     INTERVAL EVENTS: hypotensive to SBP 77, s/p 1L overnight, T100.4 @1600 11/26     PAST MEDICAL & SURGICAL HISTORY  Emphysematous COPD  Status post insertion of percutaneous endoscopic gastrostomy (PEG) tube      ALLERGIES:  No Known Allergies    MEDICATIONS:  STANDING MEDICATIONS  albuterol/ipratropium for Nebulization 3 milliLiter(s) Nebulizer every 6 hours  guaiFENesin    Syrup 200 milliGRAM(s) Oral every 6 hours  heparin  Injectable 5000 Unit(s) SubCutaneous every 12 hours  influenza   Vaccine 0.5 milliLiter(s) IntraMuscular once  meropenem  IVPB 1000 milliGRAM(s) IV Intermittent every 8 hours  methylPREDNISolone sodium succinate Injectable 40 milliGRAM(s) IV Push every 8 hours  midodrine 5 milliGRAM(s) Oral every 8 hours  multivitamin 1 Tablet(s) Oral daily  pantoprazole   Suspension 40 milliGRAM(s) Enteral Tube daily  scopolamine   Patch 1 Patch Transdermal every 72 hours  senna 2 Tablet(s) Oral at bedtime    PRN MEDICATIONS  acetaminophen    Suspension .. 650 milliGRAM(s) Oral every 6 hours PRN  morphine  - Injectable 2 milliGRAM(s) IV Push every 4 hours PRN    VITALS:   T(F): 95.8  HR: 129  BP: 114/67  RR: 18  SpO2: 96%    LABS:                        8.8    37.57 )-----------( 413      ( 27 Nov 2019 06:38 )             28.8     11-27    146  |  102  |  35<H>  ----------------------------<  132<H>  4.3   |  25  |  0.7    Ca    9.1      27 Nov 2019 06:38  Mg     2.2     11-27        ABG - ( 25 Nov 2019 17:34 )  pH, Arterial: 7.51  pH, Blood: x     /  pCO2: 35    /  pO2: 100   / HCO3: 28    / Base Excess: 4.9   /  SaO2: 99          Culture - Blood (collected 25 Nov 2019 19:07)  Source: .Blood None  Preliminary Report (27 Nov 2019 02:12):    No growth to date.    PHYSICAL EXAM:  GEN: No acute distress, cachetic male w NC   PULM/CHEST: rhonchi    CVS: normal rate, regular rhythm, S1-S2, no murmurs  ABD: Soft, non-tender, non-distended, +BS, binders in place, peg in place   EXT: No edema  NEURO: AAOx1 SUBJECTIVE:    Patient is a 79y old Male who presents with a chief complaint of Sepsis (26 Nov 2019 18:02)    Currently admitted to medicine with the primary diagnosis of Sepsis     Today is hospital day 11d. Resting in bed, w occasional grunting, mildly distressed     INTERVAL EVENTS: hypotensive to SBP 77, s/p 1L overnight, T100.4 @1600 11/26   unable to obtain ros    PAST MEDICAL & SURGICAL HISTORY  Emphysematous COPD  Status post insertion of percutaneous endoscopic gastrostomy (PEG) tube      ALLERGIES:  No Known Allergies    MEDICATIONS:  STANDING MEDICATIONS  albuterol/ipratropium for Nebulization 3 milliLiter(s) Nebulizer every 6 hours  guaiFENesin    Syrup 200 milliGRAM(s) Oral every 6 hours  heparin  Injectable 5000 Unit(s) SubCutaneous every 12 hours  influenza   Vaccine 0.5 milliLiter(s) IntraMuscular once  meropenem  IVPB 1000 milliGRAM(s) IV Intermittent every 8 hours  methylPREDNISolone sodium succinate Injectable 40 milliGRAM(s) IV Push every 8 hours  midodrine 5 milliGRAM(s) Oral every 8 hours  multivitamin 1 Tablet(s) Oral daily  pantoprazole   Suspension 40 milliGRAM(s) Enteral Tube daily  scopolamine   Patch 1 Patch Transdermal every 72 hours  senna 2 Tablet(s) Oral at bedtime    PRN MEDICATIONS  acetaminophen    Suspension .. 650 milliGRAM(s) Oral every 6 hours PRN  morphine  - Injectable 2 milliGRAM(s) IV Push every 4 hours PRN    VITALS:   T(F): 95.8  HR: 129  BP: 114/67  RR: 18  SpO2: 96%    LABS:                        8.8    37.57 )-----------( 413      ( 27 Nov 2019 06:38 )             28.8     11-27    146  |  102  |  35<H>  ----------------------------<  132<H>  4.3   |  25  |  0.7    Ca    9.1      27 Nov 2019 06:38  Mg     2.2     11-27        ABG - ( 25 Nov 2019 17:34 )  pH, Arterial: 7.51  pH, Blood: x     /  pCO2: 35    /  pO2: 100   / HCO3: 28    / Base Excess: 4.9   /  SaO2: 99          Culture - Blood (collected 25 Nov 2019 19:07)  Source: .Blood None  Preliminary Report (27 Nov 2019 02:12):    No growth to date.    PHYSICAL EXAM:  GEN: No acute distress, cachetic male w NC   PULM/CHEST: rhonchi    CVS: normal rate, regular rhythm, S1-S2, no murmurs  ABD: Soft, non-tender, non-distended, +BS, binders in place, peg in place   EXT: No edema  NEURO: AAOx1

## 2019-11-27 NOTE — PROGRESS NOTE ADULT - EXTREMITIES
No cyanosis, clubbing or edema
detailed exam
No cyanosis, clubbing or edema

## 2019-11-27 NOTE — PROGRESS NOTE ADULT - ASSESSMENT
Consult Summary  79 year old man with history of COPD, ILD, advanced dementia, dysphagia with PEG placement with recent hospitalization for sepsis/pneumonia in early November was admitted for fever and altered mental status and found to have sepsis/pneumonia. Patient's hospital course has been complicated by sepsis in the setting of possible post-obstructive pneumonia vs chronic aspiration, COPD exacerbation, recurrent fevers and leukocytosis despite treatment.  Per recent discussion with primary team, the family had refused bronchoscopy for additional workup and were considering comfort measures only.  Palliative care was consulted for [facility].    Patient seen at bedside. Family states he was awake most of the night and day, and that he's less drowsy today. Per discussion with family at bedside, plan for family meeting at 1030am on 11/29  to discuss comfort measures.  Family wants no change in current medical management until then. Family agreeable to hospice consult    Morphine Equivalent Daily Dose (MEDD):  15mg    Recommendations:  -DNR/DNI per previous discussions between family and primary team  -ongoing medical management per primary team  -no escalation of care  -plan for initiation of comfort measures on 11/29 at 1030am; will plan to meet with family again prior to initiation of comfort measures  -patient with some noted secretions and mild tachypnea noted on exam - agree with scopolamine patch;  -continue morphine 2mg IV q4h PRN for dyspnea  -hospice consult  -If patient has ongoing agitation that interferes with medical care, can consider trial of low dose haldol 0.5mg sublingual x1 for agitation  -Palliative care will continue to follow      Please Call [phone] PRN

## 2019-11-28 NOTE — PROGRESS NOTE ADULT - SUBJECTIVE AND OBJECTIVE BOX
INTERVAL HPI/OVERNIGHT EVENTS:    SUBJECTIVE: Patient seen and examined at bedside.     unable to obtain ros    OBJECTIVE:    VITAL SIGNS:  ICU Vital Signs Last 24 Hrs  T(C): 36.6 (28 Nov 2019 04:42), Max: 36.6 (27 Nov 2019 19:44)  T(F): 97.8 (28 Nov 2019 04:42), Max: 97.9 (27 Nov 2019 19:44)  HR: 148 (28 Nov 2019 04:42) (100 - 148)  BP: 127/69 (28 Nov 2019 04:42) (112/61 - 132/73)  BP(mean): --  ABP: --  ABP(mean): --  RR: 21 (28 Nov 2019 08:45) (18 - 21)  SpO2: 93% (28 Nov 2019 08:45) (90% - 93%)        11-27 @ 07:01  -  11-28 @ 07:00  --------------------------------------------------------  IN: 410 mL / OUT: 0 mL / NET: 410 mL      CAPILLARY BLOOD GLUCOSE          PHYSICAL EXAM:    General: agonal breathing, unarousable  HEENT:   Neck: supple  Respiratory: deferred  Cardiovascular: deferred  Abdomen: deferred  Extremities:   Skin: normal color and turgor; no rash  Neurological:    MEDICATIONS:  MEDICATIONS  (STANDING):  albuterol/ipratropium for Nebulization 3 milliLiter(s) Nebulizer every 6 hours  guaiFENesin    Syrup 200 milliGRAM(s) Oral every 6 hours  heparin  Injectable 5000 Unit(s) SubCutaneous every 12 hours  influenza   Vaccine 0.5 milliLiter(s) IntraMuscular once  methylPREDNISolone sodium succinate Injectable 40 milliGRAM(s) IV Push every 8 hours  midodrine 5 milliGRAM(s) Oral every 8 hours  morphine  - Injectable 2 milliGRAM(s) IV Push every 2 hours  multivitamin 1 Tablet(s) Oral daily  pantoprazole   Suspension 40 milliGRAM(s) Enteral Tube daily  scopolamine   Patch 1 Patch Transdermal every 72 hours  scopolamine   Patch 1 Patch Transdermal every 72 hours  senna 2 Tablet(s) Oral at bedtime    MEDICATIONS  (PRN):  acetaminophen    Suspension .. 650 milliGRAM(s) Oral every 6 hours PRN Mild Pain (1 - 3)      ALLERGIES:  Allergies    No Known Allergies    Intolerances        LABS:                        8.8    37.57 )-----------( 413      ( 27 Nov 2019 06:38 )             28.8     Hemoglobin: 8.8 g/dL (11-27 @ 06:38)  Hemoglobin: 8.1 g/dL (11-26 @ 06:43)  Hemoglobin: 8.3 g/dL (11-25 @ 07:24)  Hemoglobin: 8.2 g/dL (11-24 @ 07:37)    CBC Full  -  ( 27 Nov 2019 06:38 )  WBC Count : 37.57 K/uL  RBC Count : 3.35 M/uL  Hemoglobin : 8.8 g/dL  Hematocrit : 28.8 %  Platelet Count - Automated : 413 K/uL  Mean Cell Volume : 86.0 fL  Mean Cell Hemoglobin : 26.3 pg  Mean Cell Hemoglobin Concentration : 30.6 g/dL  Auto Neutrophil # : 35.37 K/uL  Auto Lymphocyte # : 0.68 K/uL  Auto Monocyte # : 0.92 K/uL  Auto Eosinophil # : 0.00 K/uL  Auto Basophil # : 0.06 K/uL  Auto Neutrophil % : 94.2 %  Auto Lymphocyte % : 1.8 %  Auto Monocyte % : 2.4 %  Auto Eosinophil % : 0.0 %  Auto Basophil % : 0.2 %    11-27    146  |  102  |  35<H>  ----------------------------<  132<H>  4.3   |  25  |  0.7    Ca    9.1      27 Nov 2019 06:38  Mg     2.2     11-27      Creatinine Trend: 0.7<--, 0.7<--, 0.6<--, 0.6<--, 0.5<--, 0.5<--        hs Troponin:              CSF:                      EKG:   MICROBIOLOGY:    Culture - Blood (collected 25 Nov 2019 19:07)  Source: .Blood None  Preliminary Report (27 Nov 2019 02:12):    No growth to date.      IMAGING:      Labs, imaging, EKG personally reviewed    RADIOLOGY & ADDITIONAL TESTS: Reviewed.

## 2019-11-28 NOTE — PROGRESS NOTE ADULT - ASSESSMENT
79M PMHx COPD, ILD, BPH, dysphagia s/p PEG here with sepsis, present on admission, now resolved due to postobstructive pneumonia. Suspected lung ca. Now comfort care, actively dying.    #Acute hypoxic resp failure, in setting of dysphagia, aspiration pneumonia, postobstructive pneumonia  agonal breathing, actively dying  family aware, at bedside  comfort care  no supp o2, no vital signs  #COPD  comfort care  #ILD  comfort care  #BPH  comfort care  #Dysphagia  peg in place  #DVT ppx  subq hep    #Progress Note Handoff:  Pending (specify):  Consults_________, Tests________, Test Results_______, Other___actively dying______  Family discussion:d/w son at bedside re: treatment plan, primary dx  Disposition: Home___/SNF___/Other________/Unknown at this time_____x___ 79M PMHx COPD, ILD, BPH, dysphagia s/p PEG here with sepsis, present on admission, now resolved due to postobstructive pneumonia. CT chest concerning for neoplasm, bronch deferred. Now comfort care, actively dying.    #Acute hypoxic resp failure, in setting of dysphagia, aspiration pneumonia, postobstructive pneumonia  agonal breathing, actively dying  family aware, at bedside  comfort care  no supp o2, no vital signs  #COPD  comfort care  #ILD  comfort care  #BPH  comfort care  #Dysphagia  peg in place  #DVT ppx  subq hep    #Progress Note Handoff:  Pending (specify):  Consults_________, Tests________, Test Results_______, Other___actively dying______  Family discussion:d/w son at bedside re: treatment plan, primary dx  Disposition: Home___/SNF___/Other________/Unknown at this time_____x___

## 2019-11-28 NOTE — PROGRESS NOTE ADULT - REASON FOR ADMISSION
Sepsis

## 2019-11-28 NOTE — DISCHARGE NOTE FOR THE EXPIRED PATIENT - HOSPITAL COURSE
79 year old male with PMHx of COPD, Interstitial lung disease, BPH, Dysphagia s/p PEG presenting due to fever and altered mental status, admitted for sepsis 2/2 to [facility] likely postobstructive pneumonia vs. chronic aspiration pneumonitis-s/p 8 day course of cefepime then switched to meropenem  during his stay rate, he has left lower lobe infiltrate (malignancy could not be ruled out)   Pulm rec: High risk for bronchoscopy without intubation; FAMILY REFUSED BRONCHOSCOPY  during his stay, he had 1 episode of hypotension that respond to fluids  palliative team were on board. patient started on morphine, scopolamine and BZD   patient put on comfort care after that, no labs, no studies were done over the last 48 hrs  today patient was in agony, with difficulty breathing.  passed away on 11/28/2019 at 10: 27 AM

## 2019-12-01 LAB
CULTURE RESULTS: SIGNIFICANT CHANGE UP
SPECIMEN SOURCE: SIGNIFICANT CHANGE UP

## 2019-12-03 DIAGNOSIS — A41.9 SEPSIS, UNSPECIFIED ORGANISM: ICD-10-CM

## 2019-12-03 DIAGNOSIS — J44.1 CHRONIC OBSTRUCTIVE PULMONARY DISEASE WITH (ACUTE) EXACERBATION: ICD-10-CM

## 2019-12-03 DIAGNOSIS — E87.0 HYPEROSMOLALITY AND HYPERNATREMIA: ICD-10-CM

## 2019-12-03 DIAGNOSIS — D63.8 ANEMIA IN OTHER CHRONIC DISEASES CLASSIFIED ELSEWHERE: ICD-10-CM

## 2019-12-03 DIAGNOSIS — J15.9 UNSPECIFIED BACTERIAL PNEUMONIA: ICD-10-CM

## 2019-12-03 DIAGNOSIS — Z51.5 ENCOUNTER FOR PALLIATIVE CARE: ICD-10-CM

## 2019-12-03 DIAGNOSIS — E43 UNSPECIFIED SEVERE PROTEIN-CALORIE MALNUTRITION: ICD-10-CM

## 2019-12-03 DIAGNOSIS — J96.01 ACUTE RESPIRATORY FAILURE WITH HYPOXIA: ICD-10-CM

## 2019-12-03 DIAGNOSIS — I95.9 HYPOTENSION, UNSPECIFIED: ICD-10-CM

## 2019-12-03 DIAGNOSIS — F03.90 UNSPECIFIED DEMENTIA WITHOUT BEHAVIORAL DISTURBANCE: ICD-10-CM

## 2019-12-03 DIAGNOSIS — N40.0 BENIGN PROSTATIC HYPERPLASIA WITHOUT LOWER URINARY TRACT SYMPTOMS: ICD-10-CM

## 2019-12-03 DIAGNOSIS — Z66 DO NOT RESUSCITATE: ICD-10-CM

## 2019-12-03 DIAGNOSIS — E86.0 DEHYDRATION: ICD-10-CM

## 2020-02-07 NOTE — ED PROVIDER NOTE - SECONDARY DIAGNOSIS.
"Writer was notified that Pt had fallen around 2100. Chair alarm sounded and staff went to assist Pt and found Pt had slide out of chair and was sitting on the floor in front of chair. VSS. BP (!) 166/49   Pulse 80   Temp 99.2  F (37.3  C) (Tympanic)   Resp 16   Ht 1.6 m (5' 3\")   Wt 48.1 kg (106 lb)   SpO2 95%   BMI 18.78 kg/m    Pt did not appear to have hit her head. No open wounds or abrasions. No abnormal skin assessment was found. All 4 extremities have AROM. MD was notified. Pt was moved from 314 to 301 and a sitter is at bedside. Pt is disorientated x4. Bed alarm is on. Will continue to monitor.    Janet Kothari RN on 2/6/2020 at 10:10 PM    " Cachectic

## 2022-08-10 NOTE — SWALLOW BEDSIDE ASSESSMENT ADULT - MODE OF PRESENTATION
To get better and follow your care plan as instructed.  Please continue taking your medications.  Please follow up with your pulmonologist. self fed Please continue taking your medications.  Please follow up with your pulmonologist.

## 2023-01-01 NOTE — PHYSICAL THERAPY INITIAL EVALUATION ADULT - RANGE OF MOTION EXAMINATION, REHAB EVAL
Carolina Wellington MD  [address] [address]    RE:      Brady Bower  MRN:  [id number]  :   2023    Dear Dr. Wellington:    It was my pleasure to see Brady Bower in clinic today regarding perirectal fistula.    His fistula that we laid open has healed nicely.  The fistula with the seton looks like it is noninflamed.  Today I clipped and removed the seton.  We are going to plan to have him follow up in 2 weeks if the wound has not closed up by that time.    Thank you very much for allowing us to be involved in Brady's care.  Please contact me if I can be of further assistance.    Sincerely,            
bilateral lower extremity ROM was WFL (within functional limits)

## 2023-05-06 NOTE — ED ADULT NURSE NOTE - ED STAT RN HANDOFF TIME
76M with PMH bladder cancer (s/p tumor resection, s/p localized chemo and now in remission), GERD, L knee OA, chronic lymphedema, kyphoscoliosis s/p C-sacrum fusion at [facility] (10/5/2020, w/ Dr. Carlisle), depression, panic disorder, RUE DVT (on eliquis), BPH, presenting for LE swelling. He has had chronic lymphedema from prior spinal surgery.  Saw vascular Dr. Moreno in February who recommended lymphedema pump. He has not been using it because he feels it makes his swelling worse. Also cannot wear compression socks on his own. Also c/o L knee pain. Denies chest pain, SOB, fevers, chills. Does not report drainage from legs. His brother is a cardiologist who recommended he come to the ED for diuretics and antibiotics. He was going to be discharged from ED but felt uncomfortable going home.     In the ED, VSS. Labs unremarkable. LE dopplers negative.  76M with PMH bladder cancer (s/p tumor resection, s/p localized chemo and now in remission), GERD, L knee OA, chronic lymphedema, kyphoscoliosis s/p C-sacrum fusion at [facility] (10/5/2020, w/ Dr. Carlisle), depression, panic disorder, RUE DVT (on eliquis), BPH, presenting for LE swelling. He has had chronic lymphedema from prior spinal surgery.  Saw vascular Dr. Moreno in February who recommended lymphedema pump. He has not been using it because he feels it makes his swelling worse. Also cannot wear compression socks on his own. Also c/o L knee pain. Denies chest pain, SOB, fevers, chills. Does not report drainage from legs. His brother is a cardiologist who recommended he come to the ED for diuretics and antibiotics. He was going to be discharged from ED but felt uncomfortable going home.     In the ED, VSS. Labs unremarkable. LE dopplers negative. Given CTX 1g and 500cc NS bolus. 76M with PMH bladder cancer (s/p tumor resection, s/p localized chemo and now in remission), GERD, L knee OA, chronic lymphedema, kyphoscoliosis s/p C-sacrum fusion at [facility] (10/5/2020, w/ Dr. Carlisle), depression, panic disorder, RUE DVT (on eliquis), BPH, presenting for LE swelling. He has had chronic lymphedema from prior spinal surgery.  Saw vascular Dr. Moreno in February who recommended lymphedema pump. He has not been using it because he feels it makes his swelling worse. Also cannot wear compression socks on his own. Also c/o L knee pain. Denies chest pain, SOB, fevers, chills. Does not report drainage from legs. His brother is a cardiologist who recommended he come to the ED for diuretics and antibiotics. He was going to be discharged from ED but felt uncomfortable going home.     In the ED, VSS. Labs unremarkable. LE dopplers negative. EKG NSR. CXR no obvious infiltrate or pulmonary edema. Given CTX 1g and 500cc NS bolus. 76M with PMH bladder cancer (s/p tumor resection, s/p localized chemo and now in remission), GERD, L knee OA, chronic lymphedema, kyphoscoliosis s/p C-sacrum fusion at [facility] (10/5/2020, w/ Dr. Carlisle), depression, panic disorder, RUE DVT, BPH, presenting for LE swelling. He has had chronic lymphedema from prior spinal surgery.  Saw vascular Dr. Moreno in February who recommended lymphedema pump. He has not been using it because he feels it makes his swelling worse. Also cannot wear compression socks on his own. Also c/o L knee pain. Denies chest pain, SOB, fevers, chills. Does not report drainage from legs. His brother is a cardiologist who recommended he come to the ED for diuretics and antibiotics. He was going to be discharged from ED but felt uncomfortable going home.     In the ED, VSS. Labs unremarkable. LE dopplers negative. EKG NSR. CXR no obvious infiltrate or pulmonary edema. Given CTX 1g and 500cc NS bolus. 00:36

## 2023-06-07 NOTE — DIETITIAN INITIAL EVALUATION ADULT. - ADD RECOMMEND
Continue current diet order consistent with SLP recs Isotretinoin Counseling: Patient should get monthly blood tests, not donate blood, not drive at night if vision affected, not share medication, and not undergo elective surgery for 6 months after tx completed. Side effects reviewed, pt to contact office should one occur.

## 2023-08-30 NOTE — PATIENT PROFILE ADULT - NSPROGENANESREACTION_GEN_A_NUR
Quality 130: Documentation Of Current Medications In The Medical Record: Current Medications Documented
Detail Level: Detailed
Quality 431: Preventive Care And Screening: Unhealthy Alcohol Use - Screening: Patient not identified as an unhealthy alcohol user when screened for unhealthy alcohol use using a systematic screening method
Quality 226: Preventive Care And Screening: Tobacco Use: Screening And Cessation Intervention: Patient screened for tobacco use and is an ex/non-smoker
unable to obtain, pt is confused

## 2023-11-16 NOTE — PATIENT PROFILE ADULT - NSPROEDALEARNPREFOTH_GEN_A_NUR
Medication:    Outpatient Medications Marked as Taking for the 3/23/21 encounter (Refill) with ROSSY Orozco   Medication Sig Dispense Refill   • amphetamine-dextroamphetamine (Adderall) 10 MG tablet Take 1 tablet by mouth daily. 30 tablet 0   • propranolol (INDERAL) 10 MG tablet Take 1 tablet by mouth 3 times daily. 60 tablet 5       Message to Prescriber:     [x] Pharmacy has been verified.    [] Patient completely out of medication (*Route encounter as high priority if checked)    [] Patient informed refill request can take up to 3 business days to be processed    No future appointment-PSAR unable to schedule in Steph's schedule due to it being blocked.       amphetamine-dextroamphetamine (Adderall) 10 MG tablet 30 tablet 0 2/9/2021 3/20/2021    Sig - Route: Take 1 tablet by mouth daily.    Escribed 1/13/21     Disp Refills Start End    propranolol (INDERAL) 10 MG tablet 60 tablet 5 6/17/2020     Sig - Route: Take 1 tablet by mouth 3 times daily.    Per Dispense History in EHR, last dispensed  2/18/21.  Patient has been refilling about every 7 weeks.    Next appointment:  None  PSARs notified via staff message to schedule appointment.    Last appointment:  1/13/21  Missed appointments:  None    Per ePDMP, last dispensed on  2/18/21  Is the patient due for refill of this medication(s):  Yes  PDMP review:  [x] Criteria MET. Rx pended and sent to provider for approval.  [] Criteria NOT MET.    Forwarded to provider for further review and decision on medication(s) requested.        Steph, Please note propranolol last written for TID #60, and patient has not been refilling every 30 days.   mammogram written material

## 2024-04-16 NOTE — PATIENT PROFILE ADULT - NSPROSPHOSPCHAPLAINYN_GEN_A_NUR
Medical Necessity Clause: This procedure was medically necessary because the lesions that were treated were: irritated Detail Level: Detailed Treatment Number (Will Not Render If 0): 1 Post-Care Instructions: I reviewed with the patient in detail post-care instructions. Patient is to wear sunprotection, and avoid picking at any of the treated lesions. Pt may apply Vaseline to crusted or scabbing areas. Render Post-Care Instructions In Note?: no Consent: The patient's consent was obtained including but not limited to risks of crusting, scabbing, blistering, scarring, darker or lighter pigmentary change, recurrence, incomplete removal and infection. Medical Necessity Information: It is in your best interest to select a reason for this procedure from the list below. All of these items fulfill various CMS LCD requirements except the new and changing color options. Anesthesia Type: 2% lidocaine with epinephrine Anesthesia Volume In Cc: 0.5 unable to obtain, pt is confused

## 2024-11-22 NOTE — PATIENT PROFILE ADULT - NSPROEDAABILITYLEARN_GEN_A_NUR
Oncology New Patient Visit      Patient ID: Kika Marcelino is a 57 y.o. female who presents today to [facility] care.  Referring Physician: Gerard Nixon MD  [address]  [place] [phone]  Primary Care Provider: Rashaad Palma MD    Patient Timeline    Date  Event    6/28/24 CT kidney shows R renal lesion    10/1/24 MR kidney - 3.9 cm R renal mass    10/9/24 R partial nephrectomy - 3.9 cm ccRCC of R kidney, grade 3. Invade segmental vein. Margins negative. Staged [id number]    10/23/24 CT chest without metastasis, resolution of previously identified infiltrate    Cancer Staging   Renal cell cancer, right (Multi)  Staging form: Kidney, AJCC 8th Edition  - Clinical stage from 10/9/2024: Stage III (ycT3a, cN0, cM0) - Signed by Gerard Nixon MD on 11/5/2024    [facility]    Kika Marcelino presents accompanied by her niece and niece's wife. Last summer, she developed shortness of breath found to be due to pneumonia and was incidentally found to have kidney mass. She underwent partial nephrectomy last month. She says that the first 1-2 weeks after surgery were difficult, however, once drain was removed all postoperative pain resolved. She currently has no pain or hematuria. She feels healthy overall and reports no fevers, chills, night sweats, dyspnea, chest pain, abdominal pain, nausea, vomiting, diarrhea, constipation, extremity weakness, neuropathy, skin changes/rash, easy bleeding/bruising, vision changes, or headaches.    ROS    A 14 point review of systems was performed and is negative unless otherwise stated in the HPI    PMH    Past Medical History:   Diagnosis Date    COVID-19     Current smoker     Pneumonia 06/27/2024    Renal mass     right    Vision loss       Medications    Current Outpatient Medications   Medication Instructions    ibuprofen 200 mg, Every 6 hours PRN        Fam Hx    Family History   Problem Relation Name Age of Onset    Breast cancer Mother Gianna     Cancer  "Mother Gianna     Heart attack Father      Lung cancer Sister      Cancer Mother's Sister      Cancer Mother's Brother Otoniel     Cancer Sister Apolonia Miranda    Lives alone in [place]. 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 She smokes about 1/3 ppd. No heavy alcohol use. Occasional marijuana use.    Objective     Physical Examination    /84 (BP Location: Left arm, Patient Position: Sitting)   Pulse 91   Temp 36.8 °C (98.2 °F) (Temporal)   Resp 18   Ht (S) 1.625 m (5' 3.98\")   Wt 97.6 kg (215 lb 1.6 oz)   LMP  (LMP Unknown) Comment: more than ten years ago  SpO2 97%   BMI 36.95 kg/m²   BSA: 2.1 meters squared    Performance Status:  Asymptomatic (ECO)    Physical Exam    GENERAL: Well appearing, in no apparent distress  HEENT: No cervical or supraclavicular adenopathy. Mucous membranes moist.  CV: Regular rate and rhythm, Normal S1, S2, no murmurs/rubs/gallops  RESP: Normal work of breathing, CTAB without wheeze or crackles  ABD: Normoactive bowel sounds. Soft, nontender, nondistended.  EXT: Warm and well perfused, no edema  NEURO: A&O x 3, normal gait  SKIN: No visible rashes or bruising.  PSYCH: Normal affect.    Results    Labs  Lab Results   Component Value Date    WBC 10.3 10/10/2024    HGB 13.3 10/10/2024    HCT 40.2 10/10/2024    MCV 87 10/10/2024     10/10/2024      Lab Results   Component Value Date    GLUCOSE 102 (H) 10/10/2024    CALCIUM 8.9 10/10/2024     10/10/2024    K 4.2 10/10/2024    CO2 27 10/10/2024     10/10/2024    BUN 14 10/10/2024    CREATININE 1.02 10/10/2024      Lab Results   Component Value Date    ALT 27 2024    AST 21 2024    ALKPHOS 37 2024    BILITOT 0.6 2024       Images    Imaging reviewed in EHR and summarized below:      MR KIDNEY 10/1    1.  Right renal upper pole anterior partially exophytic 3.8 x 3.7 x 3.9 cm mixed solid and cystic complex mass with enhancement of the  solid components most compatible with renal " neoplasm.  2. Several small renal cysts bilaterally. No hydronephrosis bilaterally.  3. Hepatomegaly and fatty infiltration of the liver.  4. Cholelithiasis. No biliary dilation.      CT CHEST WO IV CONTRAST 10/23    - Impression -  1. Interval resolution of bilateral upper lobe infiltrate since previous exam. No evidence of acute pathology.  2. Redemonstration of cholelithiasis but no secondary signs of acute cholecystitis within limitation of this exam.  3. Left posterior medial diaphragmatic hernia containing fat.  4. Postoperative changes suspected in the right kidney, however, evaluation limited. Recommend clinical correlation. Further evaluation with renal ultrasound can be performed as clinically warranted for better assessment.  5. Additional detailed findings as above.    Pathology    R partial nephrectomy 10/9/24    A. RIGHT KIDNEY, PARTIAL NEPHRECTOMY:  -- CLEAR CELL RENAL CELL CARCINOMA, G3, 3.9 CM.  -- TUMOR EXTENDS INTO SEGMENTAL BRANCH OF RENAL VEIN.  -- MARGINS OF RESECTION NEGATIVE FOR CARCINOMA.     Genomics    Somatic:  None  Germline:  None    Assessment/Plan    Kika Marcelino is a 57 y.o. 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    We discussed management of stage III kidney cancer. The KEYNOTE 564 study demonstrated progression free and overall survival benefit with one year of pembrolizumab. Other immunotherapy studies in the same setting have been negative, however. We discussed toxicities of immunotherapy including but not limited to abnormal thyroid function, skin rash, elevated liver enzymes, diarrhea, and pneumonitis. She will start pembrolizumab after the holidays and signed informed consent today.    # Stage III clear cell renal cell carcinoma  - Follows with urology (Dr. Nixon)  - Plan to start adjuvant pembrolizumab 1/3; informed consent signed today  - Baseline  CBC, CMP, TSH, ACTH, cortisol will be obtained prior to treatment  - Next imaging scheduled Feb 2025    # Tobacco Use  - Discussed cessation but she is not yet ready    The above plan was discussed with the patient and family, and they were in agreement. All questions were answered to their satisfaction.    RV 6 weeks with labs (CBC, CMP, TSH, ACTH, Cortisol) for first cycle of treatment    More than 60 minutes were spent in face-to-face encounter, review of medical records, coordination of care, and documentation.     none

## 2025-07-16 NOTE — PROGRESS NOTE ADULT - GUM GEN PE MLT EXAM PC
Chief Complaint   Patient presents with    Vomiting    Abdominal Pain        HPI:   This very nice 49-year-old male with a history of obstructive sleep apnea, morbidly obese, diabetes, status post gastric bypass surgery with Dr. Ferrara, 7/3/2025, presents today with intractable vomiting.  Only able to handle small amounts of fluid.  Left the hospital on July 4.     This is a patient with a history of recent surgery presenting with vomiting, acid reflux, and diarrhea.    The patient underwent surgery on 07/03/2025 and was discharged on 07/04/2025. He reports that his condition remained stable until 07/14/2025 when he began experiencing vomiting and was unable to retain any food or drink. This issue persisted into 07/15/2025, accompanied by intense acid reflux. Upon waking up on 07/16/2025, he had diarrhea and vomited yellow bile, which caused a burning sensation in his throat. He also reports a significant decrease in energy levels. Since his hospital discharge, his weight has dropped from 380 pounds to 357 pounds. Post-surgery, a CT scan was performed due to concerns about a potential leak, which was associated with severe stomach pain. He has been attempting to consume protein drinks but finds it challenging as they often induce vomiting. His stomach appears distended, and he believes he may be dehydrated. He took Prilosec this morning.        ROS     Constitutional: No fever, fatigue or weight loss.  Skin: No rash  Eyes: No recent vision problems or eye pain  ENT: No congestion, ear pain, or sore throat  Cardiovascular: No chest pain  Respiratory: No cough, shortness of breath, or wheezing.  Gastrointestinal: No abdominal pain, Yes nausea, vomiting, no diarrhea  Genitourinary: No dysuria  Musculoskeletal: No joint swelling  Neurologic: No headache.  Hematologic: No unusual bruising or bleeding.  Otherwise 10-point review of systems reviewed and otherwise negative.          Patient Active Problem List   Diagnosis 
   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)    Diabetes mellitus  (CMD)        PAST MEDICAL HX:    Bronchitis                                                    COPD (chronic obstructive pulmonary disease)  *               Sleep apnea                                                     Comment: BiPAP    Gout                                                          Hidradenitis suppurativa of multiple sites                    Diabetes mellitus  (CMD)                                      Anterior cruciate ligament tear                               GERD (gastroesophageal reflux disease)                        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)               PAST SURGICAL HX:    KNEE SURGERY                                                    Comment: Knee arthroscopy    VENTRAL HERNIA REPAIR                           2017          BARIATRIC SURGERY                               07/03/2025      Comment: MAGDIEL    Social History     Tobacco Use    Smoking status: Former     Current packs/day: 1.50     Average packs/day: 1.5 packs/day for 27.6 years (41.5 ttl pk-yrs)     Types: Cigarettes     Start date: 11/25/1997    Smokeless tobacco: Never   Vaping Use    Vaping status: Some Days    Substances: Nicotine   Substance Use Topics    Alcohol use: Not Currently    Drug use: Never        ED Triage Vitals [07/16/25 1238]   /73   Heart Rate 90   Resp 16   Temp 97.7 °F (36.5 °C)   SpO2 98 %        Physical Exam  Vitals and nursing note reviewed.   Constitutional:       General: He is not in acute distress.     Appearance: He is obese. He is ill-appearing. He is not 
toxic-appearing.   HENT:      Head: Normocephalic and atraumatic.      Nose: Nose normal.      Mouth/Throat:      Mouth: Mucous membranes are dry.   Eyes:      General: No scleral icterus.     Extraocular Movements: Extraocular movements intact.      Conjunctiva/sclera: Conjunctivae normal.      Pupils: Pupils are equal, round, and reactive to light.   Neck:      Vascular: No JVD.   Cardiovascular:      Rate and Rhythm: Normal rate and regular rhythm.      Pulses: Normal pulses.      Heart sounds: Normal heart sounds. No murmur heard.  Pulmonary:      Effort: Pulmonary effort is normal. No respiratory distress.      Breath sounds: Normal breath sounds.   Chest:      Chest wall: No tenderness.   Abdominal:      General: Bowel sounds are normal. There is distension.      Palpations: Abdomen is soft. There is no mass.      Tenderness: There is abdominal tenderness. There is no guarding or rebound.      Comments: Epigastric tenderness mild   Musculoskeletal:         General: No tenderness. Normal range of motion.      Cervical back: Neck supple.      Right lower leg: No edema.      Left lower leg: No edema.   Lymphadenopathy:      Cervical: No cervical adenopathy.   Skin:     General: Skin is warm and dry.      Coloration: Skin is not pale.      Findings: No erythema or rash.   Neurological:      General: No focal deficit present.      Mental Status: He is alert and oriented to person, place, and time. Mental status is at baseline.      Cranial Nerves: No cranial nerve deficit.      Coordination: Coordination normal.      Gait: Gait normal.   Psychiatric:         Mood and Affect: Mood and affect normal.         Behavior: Behavior normal.         Judgment: Judgment normal.              Results for orders placed or performed during the hospital encounter of 07/16/25   Comprehensive Metabolic Panel    Specimen: Blood, Venous   Result Value    Fasting Status     Sodium 142    Potassium 3.4    Chloride 104    Carbon Dioxide 
25    Anion Gap 16    Glucose 107 (H)    BUN 11    Creatinine 0.80    Glomerular Filtration Rate >90     Comment: eGFR results = or >60 mL/min/1.73m2 = Normal kidney function. Estimated GFR calculated using the CKD-EPI-R (2021) equation that does not include race in the creatinine calculation.    BUN/Cr 14    Calcium 9.2    Bilirubin, Total 0.8    GOT/AST 54 (H)    GPT/ALT 55    Alkaline Phosphatase 91    Albumin 3.6    Protein, Total 7.9    Globulin 4.3 (H)    A/G Ratio 0.8 (L)   Lactic Acid Venous With Reflex    Specimen: Blood, Venous   Result Value    Lactate, Venous 1.0   Lipase    Specimen: Blood, Venous   Result Value    Lipase 24   Magnesium    Specimen: Blood, Venous   Result Value    Magnesium 2.0   Urinalysis With Microscopy & Culture If Indicated    Specimen: Urine clean catch   Result Value    COLOR, URINALYSIS Yellow    APPEARANCE, URINALYSIS Clear    GLUCOSE, URINALYSIS Negative    BILIRUBIN, URINALYSIS Negative    KETONES, URINALYSIS 15 (A)    SPECIFIC GRAVITY, URINALYSIS >1.030 (H)     Comment: Measured by refractometry  Specific Gravity results may be affected by elevated protein, glucose, or contrast media.    OCCULT BLOOD, URINALYSIS Negative    PH, URINALYSIS 6.0    PROTEIN, URINALYSIS Trace (A)    UROBILINOGEN, URINALYSIS 0.2    NITRITE, URINALYSIS Negative    LEUKOCYTE ESTERASE, URINALYSIS Negative    SQUAMOUS EPITHELIAL, URINALYSIS 1 to 5    ERYTHROCYTES, URINALYSIS 1 to 2    LEUKOCYTES, URINALYSIS 1 to 5    BACTERIA, URINALYSIS None Seen    HYALINE CASTS, URINALYSIS None Seen    MUCUS Present   CBC with Automated Differential (performable only)    Specimen: Blood, Venous   Result Value    WBC 9.0    RBC 5.11    HGB 15.0    HCT 46.1    MCV 90.2    MCH 29.4    MCHC 32.5    RDW-CV 12.9    RDW-SD 42.7        NRBC 0    Neutrophil, Percent 65    Lymphocytes, Percent 27    Mono, Percent 6    Eosinophils, Percent 1    Basophils, Percent 1    Immature Granulocytes 0    Absolute Neutrophils 5.9 
   Absolute Lymphocytes 2.4    Absolute Monocytes 0.5    Absolute Eosinophils  0.1    Absolute Basophils 0.1    Absolute Immature Granulocytes 0.0         Imaging Results              CT ABDOMEN PELVIS W CONTRAST (Final result)  Result time 07/16/25 14:34:51      Final result                   Impression:    1.   Postoperative changes related to gastric bypass surgery. Hazy  induration in the fat surrounding the gastrojejunostomy which appears  thickened. No evidence of contrast extravasation. No free air or free  fluid. Findings could relate to gastritis/enteritis or evolving postop  change.  2.   Hepatic steatosis.        Electronically Signed by: HARSHAL DIAZ M.D.   Signed on: 7/16/2025 2:34 PM   Workstation ID: [id number]               Narrative:    EXAM: CT ABDOMEN PELVIS W CONTRAST    CLINICAL INDICATION: 49 years-Year-old Male with a history of Intractable  vomiting.  Status post gastric bypass surgery, 7/3/2025 .    COMPARISON: July 4, 2025    CONTRAST:  iohexol (OMNIPAQUE 350 INJECT) contrast solution 80 mL. Given:  80 mL. .    FINDINGS:    Lower chest mild atelectasis.  Low-density enlarged liver, likely steatosis. Spleen, pancreas,  gallbladder, adrenal glands and kidneys are unremarkable.  Urinary bladder and prostate is unremarkable.  Clonic diverticula without acute diverticulitis. Appendix unremarkable.  Postoperative changes related to gastric bypass surgery. Hazy induration in  the fat surrounding the gastrojejunostomy which appears thickened. No  evidence of contrast extravasation.  No free air or free fluid.  Intra-abdominal wall mesh related to hernia repair.  Vascular structures show mild atherosclerosis.  No adenopathy. No erosive or destructive bone change detected.                                       Vitals:    07/16/25 1550 07/16/25 1600 07/16/25 1630 07/16/25 1640   BP:  137/82 126/66 122/74   Pulse: 75 78 77 71   Resp:  18 17 17   Temp:       TempSrc:       SpO2: 95% 97% 98% 
98%   Weight:               ED Medication Orders (From admission, onward)      Ordered Start     Status Ordering Provider    07/16/25 1641 07/16/25 1642  sodium chloride (NORMAL SALINE) 0.9 % bolus 1,000 mL  ONCE         Last MAR action: New Bag WHITNEY ROJAS    07/16/25 1611 07/16/25 1612  metoCLOPramide (REGLAN) injection 5 mg  ONCE         Last MAR action: Given BOB WHITNEY L    07/16/25 1611 07/16/25 1612  diphenhydrAMINE (BENADRYL) injection 25 mg  ONCE         Last MAR action: Given BOB WHITNEY L    07/16/25 1338 07/16/25 1339  iohexol ORAL (OMNIPAQUE 350) oral contrast solution 12.5 mL  ONCE         Last MAR action: Given BOB WHITNEY L    07/16/25 1337 07/16/25 1338  pantoprazole (PROTONIX INJECT) injection 40 mg  ONCE         Last MAR action: Given BOBWHITNEY    07/16/25 1303 07/16/25 1330  folic acid (FOLATE) injection 1 mg  ONCE         Last MAR action: Given BOB WHITNEY L    07/16/25 1303 07/16/25 1330  cyanocobalamin injection 1,000 mcg  ONCE         Last MAR action: Given BOB WHITNEY L    07/16/25 1303 07/16/25 1304  sodium chloride (NORMAL SALINE) 0.9 % bolus 1,000 mL  ONCE         Last MAR action: Completed BOBWHITNEY WALLS    07/16/25 1303 07/16/25 1304  ondansetron (ZOFRAN) injection 4 mg  ONCE         Last MAR action: Given BOB, WHITNEY L    07/16/25 1303 07/16/25 1304  thiamine (VITAMIN B-1) injection 100 mg  ONCE         Last MAR action: Given BOB, WHITNEY L               EKG:   My interpretation at 1339 PM.  Normal sinus rhythm with a ventricular rate of 72, NV interval 158, QRS duration of 132 and axis of -65.  No acute ST segment changes right bundle kylie block noted and unchanged from 6/17/2025 study.    Differential diagnosis:  1.  Bowel obstruction  2.  Gastric bypass anastomosis inflammation  3.  GERD  4.  Electrolyte abnormality  5.  Urinary tract infection    Review of historic charts/records:  Reviewed old chart meds 
Normal genitalia; no lesions; no discharge
labs and imaging      MDM:    Patient here was observed for several hours and given IV fluid as well as B vitamins and pantoprazole and Reglan.  Overall patient feels better and is able to tolerate sips of fluid.  Patient's general surgeon recommends patient be discharged home with close follow-up.  Will give Reglan and pantoprazole to go home.  However patient does have prescription from yesterday for Reglan and Protonix.  States that he was unable to afford the Protonix so he did buy Prilosec over-the-counter.  Will have patient double up on that amount daily.  He is able to tolerate p.o.'s here well.  Encourage patient to take a little amounts at a time and keep hydrated.  Return precautions discussed at length.    ED Course as of 07/16/25 1728   Wed Jul 16, 2025   1421 Discussed case with Dr. Ferrara, general surgery recommended 2 sips of contrast space about 30 minutes apart.  Patient already in CT scan. [EH]   1526 Discussed case with patient's general surgeon who states that patient can go home at tolerating p.o.'s okay and recommends Reglan to go home. [EH]      ED Course User Index  [EH] Mitra Garner DO        ED Diagnoses       Diagnosis Comment Associated Orders       Final diagnoses    Epigastric pain -- --    Gastroesophageal reflux disease with esophagitis without hemorrhage -- --    History of gastric bypass, 7/3/2025 -- --             Follow-up Information       Follow up With Specialties Details Why Contact Info    Tunde Espinal MD Internal Medicine Schedule an appointment as soon as possible for a visit in 2 days For reevaluation [address]  [address] [address]  [phone]      Patricio Ferrara MD General Surgery Schedule an appointment as soon as possible for a visit in 3 days For reevaluation, If symptoms worsen [address] [facility] [address]  [phone]              Current Discharge Medication List             Disposition:    Discharge after Treatment [13] 
7/16/2025  5:28 PM                          Mitra Garner DO  07/16/25 1728    
Normal genitalia; no lesions; no discharge